# Patient Record
Sex: FEMALE | Race: WHITE | NOT HISPANIC OR LATINO | Employment: OTHER | URBAN - METROPOLITAN AREA
[De-identification: names, ages, dates, MRNs, and addresses within clinical notes are randomized per-mention and may not be internally consistent; named-entity substitution may affect disease eponyms.]

---

## 2017-02-02 ENCOUNTER — OFFICE VISIT (OUTPATIENT)
Dept: UROLOGY | Facility: CLINIC | Age: 82
End: 2017-02-02
Payer: MEDICARE

## 2017-02-02 VITALS
WEIGHT: 125 LBS | DIASTOLIC BLOOD PRESSURE: 68 MMHG | HEART RATE: 60 BPM | RESPIRATION RATE: 16 BRPM | SYSTOLIC BLOOD PRESSURE: 124 MMHG | BODY MASS INDEX: 23 KG/M2 | HEIGHT: 62 IN

## 2017-02-02 DIAGNOSIS — N30.10 INTERSTITIAL CYSTITIS: Primary | ICD-10-CM

## 2017-02-02 PROCEDURE — 1126F AMNT PAIN NOTED NONE PRSNT: CPT | Mod: S$GLB,,, | Performed by: UROLOGY

## 2017-02-02 PROCEDURE — 1157F ADVNC CARE PLAN IN RCRD: CPT | Mod: S$GLB,,, | Performed by: UROLOGY

## 2017-02-02 PROCEDURE — 1159F MED LIST DOCD IN RCRD: CPT | Mod: S$GLB,,, | Performed by: UROLOGY

## 2017-02-02 PROCEDURE — 81002 URINALYSIS NONAUTO W/O SCOPE: CPT | Mod: S$GLB,,, | Performed by: UROLOGY

## 2017-02-02 PROCEDURE — 99999 PR PBB SHADOW E&M-EST. PATIENT-LVL III: CPT | Mod: PBBFAC,,, | Performed by: UROLOGY

## 2017-02-02 PROCEDURE — 1160F RVW MEDS BY RX/DR IN RCRD: CPT | Mod: S$GLB,,, | Performed by: UROLOGY

## 2017-02-02 PROCEDURE — 99213 OFFICE O/P EST LOW 20 MIN: CPT | Mod: 25,S$GLB,, | Performed by: UROLOGY

## 2017-02-02 RX ORDER — SULFAMETHOXAZOLE AND TRIMETHOPRIM 400; 80 MG/1; MG/1
1 TABLET ORAL DAILY
Qty: 90 TABLET | Refills: 3 | Status: SHIPPED | OUTPATIENT
Start: 2017-02-02 | End: 2018-02-22

## 2017-02-02 NOTE — PROGRESS NOTES
"  Subjective:       Salome Moore is a 81 y.o. female who is an established pt who was self-referred for evaluation of IC.      She reports that she has had IC her "whole life." She takes Macrodantin daily for many, many years. She has been getting her care at [facility] for many years but has decided to switch to  doctors. Her previous urologist was Dr Cox.      She reports that she used to have urethral dilations for "infantile urethra." She has been on and off the abx prophylaxis. She has issues with getting the Macrodantin covered by her insurance. Her symptoms are characterized by burning and pain with urination. She continues to take hormones.    She was given Bactrim SS for prophylaxis at last visit (5/15). She was interested in urethral dilations though I would need to do cystoscopy prior to this as efficacy is questionable.     She has been doing great since last visit. No UTIs.       The following portions of the patient's history were reviewed and updated as appropriate: allergies, current medications, past family history, past medical history, past social history, past surgical history and problem list.    Review of Systems  Constitutional: no fever or chills  ENT: no nasal congestion or sore throat  Respiratory: no cough or shortness of breath  Cardiovascular: no chest pain or palpitations  Gastrointestinal: no nausea or vomiting, tolerating diet  Genitourinary: as per HPI  Hematologic/Lymphatic: no easy bruising or lymphadenopathy  Musculoskeletal: no arthralgias or myalgias  Skin: no rashes or lesions  Neurological: no seizures or tremors  Behavioral/Psych: no auditory or visual hallucinations       Objective:    Vitals:   Visit Vitals    Resp 16    Ht 5' 2" (1.575 m)    Wt 56.7 kg (125 lb)    BMI 22.86 kg/m2       Physical Exam   General: well developed, well nourished in no acute distress  Head: normocephalic, atraumatic  Neck: supple, trachea midline, no obvious enlargement of " thyroid  HEENT: EOMI, mucus membranes moist, sclera anicteric, no hearing impairment  Lungs: symmetric expansion, non-labored breathing  Cardiovascular: regular rate and rhythm, normal pulses  Abdomen: soft, non tender, non distended, no palpable masses, no hernias, no CVA tenderness  Musculoskeletal: no peripheral edema, normal ROM in bilateral upper and lower extremities  Lymphatics: no cervical or inguinal lymphadenopathy  Skin: no rashes or lesions  Neuro: alert and oriented x 3, no gross deficits  Psych: normal judgment and insight, normal mood/affect and non-anxious  Genitourinary:   patient declined exam    Lab Review   Urine analysis today in clinic shows 5-10 RBCs    No results found for: WBC, HGB, HCT, MCV, PLT  No results found for: CREATININE, BUN    Imaging  NA         Assessment/Plan:      1. Interstitial cystitis    - Bactrim SS for prophylaxis, she would like to continue this   - Doing great   - Discussed possibility of cystoscopy to evaluate urethra. She would like to hold on this.    - She is interested in urethral dilations. I prefer to hold on this as unsure its efficacy. Recommend cysto prior to this. Will hold on this for now.       Follow up in 6 months (prefers 6 mth f/u)

## 2017-02-02 NOTE — MR AVS SNAPSHOT
"    [facility] [place] [place] [facility]  [address]  [address]  [place] [address]  Phone: [phone]                  Salome Moore   2017 3:20 PM   Office Visit    Description:  Female : 1935   Provider:  Lilia Lepe MD   Department:  [facility] [place] [place] [facility]           Reason for Visit     Follow-up           Diagnoses this Visit        Comments    Interstitial cystitis    -  Primary            To Do List           Goals (5 Years of Data)     None      Follow-Up and Disposition     Return in about 6 months (around 2017) for Follow up on symptoms.       These Medications        Disp Refills Start End    sulfamethoxazole-trimethoprim 400-80mg (BACTRIM,SEPTRA) 400-80 mg per tablet 90 tablet 3 2017     Take 1 tablet by mouth once daily. - Oral    Pharmacy: [facility] [address] [place] [address] [address] [facility] Ph #: [phone]         [facility] On Call     [facility] On [facility] Nurse Care Line -  Assistance  Registered nurses in the [facility] On [facility] Center provide clinical advisement, health education, appointment booking, and other advisory services.  Call for this free service at [phone].             Medications                Verify that the below list of medications is an accurate representation of the medications you are currently taking.  If none reported, the list may be blank. If incorrect, please contact your healthcare provider. Carry this list with you in case of emergency.           Current Medications     amitriptyline (ELAVIL) 50 MG tablet     losartan (COZAAR) 100 MG tablet     sulfamethoxazole-trimethoprim 400-80mg (BACTRIM,SEPTRA) 400-80 mg per tablet Take 1 tablet by mouth once daily.    TAZORAC 0.1 % Gel gel     zolpidem (AMBIEN) 10 mg Tab            Clinical Reference Information           Your Vitals Were     BP Pulse Resp Height Weight BMI    124/68 60 16 5' 2" (1.575 m) 56.7 kg (125 lb) 22.86 kg/m2      Blood " Pressure          Most Recent Value    BP  124/68      Allergies as of 2/2/2017     Pcn [Penicillins]      Immunizations Administered on Date of Encounter - 2/2/2017     None      [facility] Sign-Up     Activating your [facility] account is as easy as 1-2-3!     1) Visit [email or web address], select Sign Up Now, enter this activation code and your date of birth, then select Next.  [email or web address]  Expires: 3/19/2017  3:37 PM      2) Create a username and password to use when you visit [facility] in the future and select a security question in case you lose your password and select Next.    3) Enter your e-mail address and click Sign Up!    Additional Information  If you have questions, please e-mail [email or web address] or call [phone] to talk to our [facility] staff. Remember, [facility] is NOT to be used for urgent needs. For medical emergencies, dial 911.         Language Assistance Services     ATTENTION: Language assistance services are available, free of charge. Please call [phone].      ATENCIÓN: Si habla español, tiene a manley disposición servicios gratuitos de asistencia lingüística. Llame al [phone].     CHÚ Ý: N?u b?n nói Ti?ng Vi?t, có các d?ch v? h? tr? ngôn ng? mi?n phí dành cho b?n. G?i s? [phone].         [facility] complies with applicable Federal civil rights laws and does not discriminate on the basis of race, color, national origin, age, disability, or sex.

## 2017-02-03 LAB
BILIRUB SERPL-MCNC: NORMAL MG/DL
BLOOD URINE, POC: NORMAL
COLOR, POC UA: NORMAL
GLUCOSE UR QL STRIP: NORMAL
KETONES UR QL STRIP: NORMAL
LEUKOCYTE ESTERASE URINE, POC: NORMAL
NITRITE, POC UA: NORMAL
PH, POC UA: 7
PROTEIN, POC: NORMAL
SPECIFIC GRAVITY, POC UA: 1020
UROBILINOGEN, POC UA: NORMAL

## 2017-02-27 ENCOUNTER — OFFICE VISIT (OUTPATIENT)
Dept: OBSTETRICS AND GYNECOLOGY | Facility: CLINIC | Age: 82
End: 2017-02-27
Payer: MEDICARE

## 2017-02-27 VITALS
WEIGHT: 121.25 LBS | HEIGHT: 62 IN | BODY MASS INDEX: 22.31 KG/M2 | SYSTOLIC BLOOD PRESSURE: 119 MMHG | DIASTOLIC BLOOD PRESSURE: 78 MMHG

## 2017-02-27 DIAGNOSIS — Z01.419 ENCOUNTER FOR ANNUAL ROUTINE GYNECOLOGICAL EXAMINATION: Primary | ICD-10-CM

## 2017-02-27 DIAGNOSIS — N95.0 PMB (POSTMENOPAUSAL BLEEDING): ICD-10-CM

## 2017-02-27 DIAGNOSIS — Z79.890 POSTMENOPAUSAL HRT (HORMONE REPLACEMENT THERAPY): ICD-10-CM

## 2017-02-27 PROCEDURE — 99999 PR PBB SHADOW E&M-EST. PATIENT-LVL III: CPT | Mod: PBBFAC,,, | Performed by: OBSTETRICS & GYNECOLOGY

## 2017-02-27 PROCEDURE — G0101 CA SCREEN;PELVIC/BREAST EXAM: HCPCS | Mod: S$GLB,,, | Performed by: OBSTETRICS & GYNECOLOGY

## 2017-02-27 RX ORDER — MEDROXYPROGESTERONE ACETATE 10 MG/1
10 TABLET ORAL DAILY
COMMUNITY
End: 2017-02-27 | Stop reason: SDUPTHER

## 2017-02-27 RX ORDER — MEDROXYPROGESTERONE ACETATE 10 MG/1
10 TABLET ORAL DAILY
Qty: 30 TABLET | Refills: 3 | Status: SHIPPED | OUTPATIENT
Start: 2017-02-27 | End: 2017-02-27 | Stop reason: SDUPTHER

## 2017-02-27 RX ORDER — ESTRADIOL 1 MG/1
1 TABLET ORAL DAILY
COMMUNITY
End: 2017-02-27 | Stop reason: SDUPTHER

## 2017-02-27 RX ORDER — ESTRADIOL 1 MG/1
1 TABLET ORAL DAILY
Qty: 90 TABLET | Refills: 3 | Status: SHIPPED | OUTPATIENT
Start: 2017-02-27 | End: 2017-03-03 | Stop reason: SDUPTHER

## 2017-02-27 NOTE — PROGRESS NOTES
Subjective:       Patient ID: Salome Moore is a 81 y.o. female.    Chief Complaint:  Annual Exam      History of Present Illness  HPI  Salome Moore is a 81 y.o. female here for annual exam.    She reports she is still having normal cycles which are 2-3 days. She has been on HRT for years.   Reports 3 days of spotting following the 10 days of progesterone break through bleeding.   denies vaginal itching or irritation.  denies vaginal discharge.  She is not currently sexually active.   History of abnormal pap: No  Last Pap: approximate date last year and was normal  Last MMG: pt refuses mammo-- had US last year at [facility]  Last Colonoscopy: pt declines.      She has been on HRT for >30 years. She has never had other medication and does not wish to switch therapy. She tried vaginal estrogen, but did not have relief of symptoms.     GYN & OB History  No LMP recorded.   Date of Last Pap: No result found    OB History   No data available       Review of Systems  Review of Systems   Constitutional: Negative for chills, fatigue and fever.   Respiratory: Negative for shortness of breath.    Cardiovascular: Negative for chest pain.   Gastrointestinal: Negative for abdominal pain, constipation, diarrhea, nausea and vomiting.   Genitourinary: Negative for dysuria, frequency, vaginal bleeding, vaginal discharge, vaginal pain, postcoital bleeding, postmenopausal bleeding and vaginal odor.   Breast: Negative for breast mass, breast pain, nipple discharge and skin changes          Objective:    Physical Exam:   Constitutional: She is oriented to person, place, and time. She appears well-developed and well-nourished. No distress.    HENT:   Head: Normocephalic and atraumatic.     Neck: Normal range of motion. No thyromegaly present.    Cardiovascular: Normal rate and normal heart sounds.  Exam reveals no gallop and no friction rub.    No murmur heard.   Pulmonary/Chest: Effort normal and breath sounds normal. No  respiratory distress. Right breast exhibits no inverted nipple, no mass, no nipple discharge, no skin change, no tenderness, presence, no bleeding and no swelling. Left breast exhibits no inverted nipple, no mass, no nipple discharge, no skin change, no tenderness, presence, no bleeding and no swelling. Breasts are symmetrical.        Abdominal: Soft. Normal appearance and bowel sounds are normal. She exhibits no distension. There is no hepatosplenomegaly. There is no tenderness. There is no rigidity, no rebound and no guarding.     Genitourinary: There is no rash, tenderness, lesion or injury on the right labia. There is no rash, tenderness, lesion or injury on the left labia. Uterus is not deviated, not enlarged, not fixed, not tender, not hosting fibroids and not experiencing uterine prolapse. Cervix is normal. No absent adnexa (present). Right adnexum displays no mass, no tenderness and no fullness. Left adnexum displays no mass, no tenderness and no fullness. No erythema, tenderness or bleeding in the vagina. No signs of injury around the vagina. No vaginal discharge found. Cervix exhibits no motion tenderness, no discharge and no friability.   Genitourinary Comments: Urethral meatus normal        Uterus Size: 8 cm      Lymphadenopathy:     She has no cervical adenopathy.     She has no axillary adenopathy.    Neurological: She is alert and oriented to person, place, and time.     Psychiatric: She has a normal mood and affect.          Assessment:        1. Encounter for annual routine gynecological examination    2. Postmenopausal HRT (hormone replacement therapy)    3. PMB (postmenopausal bleeding)              Plan:      1. Encounter for annual routine gynecological examination  - Pap not indicated. Stop screening at age 65 per guideline recommendations.   - MMG not indicated. Recommended to stop at age 75  - Colonoscopy patient declines.      2. Postmenopausal HRT  - Continue estrogen and progesterone as  directed.   -A full discussion of the benefit-risk ratio of hormonal replacement therapy was carried out. Improvement in vasomotor and other climacteric symptoms is discussed, including possible improvements in sleep and mood. Reduction of risk for osteoporosis was explained. We discussed the study data showing increased risk of thrombo-embolic events such as myocardial infarction, stroke and also possibly breast cancer with estrogen replacement, and how this might affect her. The range of side effects such as breast tenderness, weight gain and including possible increases in lifetime risk of breast cancer and possible thrombotic complications was discussed. We also discussed ACOG's recommendation to use hormone replacement therapy for the relief of hot flashes alone and to be on the lowest dose possible for the shortest amount of time.  Alternative such as herbal and soy-based products were reviewed. All of her questions about this therapy were answered.     3.  PMB (postmenopausal bleeding)  - very minimal-- wears pantyliner. She has been doing this every since she has been on the hormones.   - Declines US.        Return in about 1 year (around 2/27/2018) for Annual exam.

## 2017-02-27 NOTE — LETTER
March 1, 2017      Lilia Lepe MD  [address]  [address]  [facility] [address]           [facility]  [address]  [address]  [facility] [address]  Phone: [phone]          Patient: Salome Moore   MR Number: [id number]   YOB: 1935   Date of Visit: 2/27/2017       Dear Dr. Lilia Lepe:    Thank you for referring Salome Moore to me for evaluation. Attached you will find relevant portions of my assessment and plan of care.    If you have questions, please do not hesitate to call me. I look forward to following Salome Moore along with you.    Sincerely,    Selena Gagnon MD    Enclosure  CC:  No Recipients    If you would like to receive this communication electronically, please contact [email or web address] or [phone] to request more information on [employer] Link access.    For providers and/or their staff who would like to refer a patient to [facility], please contact us through our one-stop-shop provider referral line, [facility], at [phone].    If you feel you have received this communication in error or would no longer like to receive these types of communications, please e-mail [email or web address]

## 2017-02-27 NOTE — MR AVS SNAPSHOT
[facility]  [address]  [address]  [place] [address]  Phone: [phone]                  Salome KUMAR Teresa   2017 3:10 PM   Office Visit    Description:  Female : 1935   Provider:  Selena Gagnon MD   Department:  [facility]           Reason for Visit     Annual Exam                To Do List           Future Appointments        Provider Department Dept Phone    2017 3:10 PM Selena Gagnon MD [facility] - OB/ [phone]    8/3/2017 3:00 PM Lilia Lepe MD [facility] Urology [phone]      Goals (5 Years of Data)     None      [facility] On [facility]     [facility] On Call Nurse Care Line -  Assistance  Registered nurses in the [facility] On Call Center provide clinical advisement, health education, appointment booking, and other advisory services.  Call for this free service at [phone].             Medications                Verify that the below list of medications is an accurate representation of the medications you are currently taking.  If none reported, the list may be blank. If incorrect, please contact your healthcare provider. Carry this list with you in case of emergency.           Current Medications     amitriptyline (ELAVIL) 50 MG tablet     estradiol (ESTRACE) 1 MG tablet Take 1 mg by mouth once daily.    losartan (COZAAR) 100 MG tablet     medroxyPROGESTERone (PROVERA) 10 MG tablet Take 10 mg by mouth once daily.    sulfamethoxazole-trimethoprim 400-80mg (BACTRIM,SEPTRA) 400-80 mg per tablet Take 1 tablet by mouth once daily.    TAZORAC 0.1 % Gel gel     zolpidem (AMBIEN) 10 mg Tab            Clinical Reference Information           Your Vitals Were     BP                   119/78           Blood Pressure          Most Recent Value    BP  119/78      Allergies as of 2017     Pcn [Penicillins]      Immunizations Administered on Date of Encounter - 2017     None      [employer] Sign-Up     Activating your [employer] account is as  easy as 1-2-3!     1) Visit [email or web address], select Sign Up Now, enter this activation code and your date of birth, then select Next.  [email or web address]  Expires: 3/19/2017  3:37 PM      2) Create a username and password to use when you visit [facility] in the future and select a security question in case you lose your password and select Next.    3) Enter your e-mail address and click Sign Up!    Additional Information  If you have questions, please e-mail [email or web address] or call [phone] to talk to our [employer] staff. Remember, [facility] is NOT to be used for urgent needs. For medical emergencies, dial 911.         Language Assistance Services     ATTENTION: Language assistance services are available, free of charge. Please call [phone].      ATENCIÓN: Si habla margaret, tiene a manley disposición servicios gratuitos de asistencia lingüística. Llame al [phone].     CHÚ Ý: N?u b?n nói Ti?ng Vi?t, có các d?ch v? h? tr? ngôn ng? mi?n phí dành cho b?n. G?i s? [phone].         [facility] [place] complies with applicable Federal civil rights laws and does not discriminate on the basis of race, color, national origin, age, disability, or sex.

## 2017-03-01 RX ORDER — MEDROXYPROGESTERONE ACETATE 10 MG/1
TABLET ORAL
Qty: 90 TABLET | Refills: 3 | Status: SHIPPED | OUTPATIENT
Start: 2017-03-01 | End: 2017-03-03 | Stop reason: SDUPTHER

## 2017-03-03 ENCOUNTER — TELEPHONE (OUTPATIENT)
Dept: OBSTETRICS AND GYNECOLOGY | Facility: CLINIC | Age: 82
End: 2017-03-03

## 2017-03-03 DIAGNOSIS — Z79.890 POSTMENOPAUSAL HRT (HORMONE REPLACEMENT THERAPY): ICD-10-CM

## 2017-03-03 RX ORDER — ESTRADIOL 1 MG/1
1 TABLET ORAL DAILY
Qty: 90 TABLET | Refills: 3 | Status: SHIPPED | OUTPATIENT
Start: 2017-03-03 | End: 2018-02-21 | Stop reason: SDUPTHER

## 2017-03-03 RX ORDER — MEDROXYPROGESTERONE ACETATE 10 MG/1
TABLET ORAL
Qty: 90 TABLET | Refills: 3 | Status: SHIPPED | OUTPATIENT
Start: 2017-03-03 | End: 2018-02-21 | Stop reason: SDUPTHER

## 2017-03-03 NOTE — TELEPHONE ENCOUNTER
Patient would like Rx sent to [facility] on file, will pay for Rx out of pocket.

## 2017-03-03 NOTE — TELEPHONE ENCOUNTER
Spoke with pharmacy, issue resolved. VICTOR M perera sent, and HR Rx request faxed 1203pm 3/3/2017 to [phone]

## 2017-03-03 NOTE — TELEPHONE ENCOUNTER
----- Message from Mona Alonzo sent at 3/3/2017  9:49 AM CST -----  Contact: [facility] [employer]    Calling re prior auth form for Estradiol . Please send back by 1:00 in order to be processed .        [phone]          FAX # [phone]

## 2018-01-24 ENCOUNTER — TELEPHONE (OUTPATIENT)
Dept: UROLOGY | Facility: CLINIC | Age: 83
End: 2018-01-24

## 2018-01-24 NOTE — TELEPHONE ENCOUNTER
----- Message from Erin Espino sent at 1/24/2018  1:44 PM CST -----  Contact: self  Patient called to schedule annual with  Patient can only be seen on Tuesday and Wednesdays. Offered patient 3/20@10:40, declined stating that she need later and will contact PHN. Upset that she can't be accommodated. Please contact her at [phone].    Thanks!

## 2018-02-21 ENCOUNTER — OFFICE VISIT (OUTPATIENT)
Dept: OBSTETRICS AND GYNECOLOGY | Facility: CLINIC | Age: 83
End: 2018-02-21
Payer: MEDICARE

## 2018-02-21 VITALS
SYSTOLIC BLOOD PRESSURE: 121 MMHG | HEIGHT: 62 IN | WEIGHT: 121 LBS | DIASTOLIC BLOOD PRESSURE: 73 MMHG | BODY MASS INDEX: 22.26 KG/M2

## 2018-02-21 DIAGNOSIS — Z79.890 POSTMENOPAUSAL HRT (HORMONE REPLACEMENT THERAPY): ICD-10-CM

## 2018-02-21 DIAGNOSIS — Z01.419 ENCOUNTER FOR ANNUAL ROUTINE GYNECOLOGICAL EXAMINATION: Primary | ICD-10-CM

## 2018-02-21 PROCEDURE — G0101 CA SCREEN;PELVIC/BREAST EXAM: HCPCS | Mod: S$GLB,,, | Performed by: OBSTETRICS & GYNECOLOGY

## 2018-02-21 PROCEDURE — 99999 PR PBB SHADOW E&M-EST. PATIENT-LVL III: CPT | Mod: PBBFAC,,, | Performed by: OBSTETRICS & GYNECOLOGY

## 2018-02-21 RX ORDER — BROMPHENIRAMINE MALEATE, PSEUDOEPHEDRINE HYDROCHLORIDE, AND DEXTROMETHORPHAN HYDROBROMIDE 2; 30; 10 MG/5ML; MG/5ML; MG/5ML
SYRUP ORAL
Refills: 0 | COMMUNITY
Start: 2017-12-29 | End: 2018-02-21

## 2018-02-21 RX ORDER — ESTRADIOL 1 MG/1
1 TABLET ORAL DAILY
Qty: 90 TABLET | Refills: 3 | Status: SHIPPED | OUTPATIENT
Start: 2018-02-21 | End: 2018-11-02 | Stop reason: SDUPTHER

## 2018-02-21 RX ORDER — MEDROXYPROGESTERONE ACETATE 10 MG/1
TABLET ORAL
Qty: 90 TABLET | Refills: 3 | Status: SHIPPED | OUTPATIENT
Start: 2018-02-21 | End: 2018-09-27 | Stop reason: ALTCHOICE

## 2018-02-21 RX ORDER — SULFAMETHOXAZOLE AND TRIMETHOPRIM 800; 160 MG/1; MG/1
TABLET ORAL
Refills: 0 | COMMUNITY
Start: 2018-02-09 | End: 2018-02-21

## 2018-02-22 ENCOUNTER — OFFICE VISIT (OUTPATIENT)
Dept: UROLOGY | Facility: CLINIC | Age: 83
End: 2018-02-22
Payer: MEDICARE

## 2018-02-22 VITALS
SYSTOLIC BLOOD PRESSURE: 122 MMHG | WEIGHT: 124.31 LBS | BODY MASS INDEX: 22.88 KG/M2 | RESPIRATION RATE: 14 BRPM | DIASTOLIC BLOOD PRESSURE: 64 MMHG | HEART RATE: 60 BPM | HEIGHT: 62 IN

## 2018-02-22 DIAGNOSIS — N30.10 INTERSTITIAL CYSTITIS: Primary | ICD-10-CM

## 2018-02-22 PROCEDURE — 99213 OFFICE O/P EST LOW 20 MIN: CPT | Mod: S$GLB,,, | Performed by: UROLOGY

## 2018-02-22 PROCEDURE — 1159F MED LIST DOCD IN RCRD: CPT | Mod: S$GLB,,, | Performed by: UROLOGY

## 2018-02-22 PROCEDURE — 1126F AMNT PAIN NOTED NONE PRSNT: CPT | Mod: S$GLB,,, | Performed by: UROLOGY

## 2018-02-22 PROCEDURE — 99999 PR PBB SHADOW E&M-EST. PATIENT-LVL III: CPT | Mod: PBBFAC,,, | Performed by: UROLOGY

## 2018-02-22 PROCEDURE — 3008F BODY MASS INDEX DOCD: CPT | Mod: S$GLB,,, | Performed by: UROLOGY

## 2018-02-22 RX ORDER — SULFAMETHOXAZOLE AND TRIMETHOPRIM 400; 80 MG/1; MG/1
1 TABLET ORAL DAILY
Qty: 90 TABLET | Refills: 3 | Status: SHIPPED | OUTPATIENT
Start: 2018-02-22 | End: 2019-03-25 | Stop reason: SDUPTHER

## 2018-02-22 NOTE — PROGRESS NOTES
Subjective:       Patient ID: Salome Moore is a 82 y.o. female.    Chief Complaint:  Annual Exam      History of Present Illness  HPI  Salome Moore is a 82 y.o. female here for annual exam.    She reports she is still having normal cycles which are 2-3 days. She has been on HRT for years.   Reports 3 days of spotting following the 10 days of progesterone break through bleeding.   denies vaginal itching or irritation.  denies vaginal discharge.  She is not currently sexually active.   History of abnormal pap: No  Last Pap: approximate date 2016 and was normal  Last MMG: pt refuses mammo-- had US last year at [facility]  Last Colonoscopy: pt declines.      She has been on HRT for >30 years. She has never had other medication and does not wish to switch therapy. She tried vaginal estrogen, but did not have relief of symptoms.     GYN & OB History  No LMP recorded. Patient is postmenopausal.   Date of Last Pap: No result found    OB History   No data available       Review of Systems  Review of Systems   Constitutional: Negative for chills, fatigue and fever.   Respiratory: Negative for shortness of breath.    Cardiovascular: Negative for chest pain.   Gastrointestinal: Negative for abdominal pain, constipation, diarrhea, nausea and vomiting.   Genitourinary: Negative for dysuria, frequency, vaginal bleeding, vaginal discharge, vaginal pain, postcoital bleeding, postmenopausal bleeding and vaginal odor.   Breast: Negative for breast mass, breast pain, nipple discharge and skin changes          Objective:    Physical Exam:   Constitutional: She is oriented to person, place, and time. She appears well-developed and well-nourished. No distress.    HENT:   Head: Normocephalic and atraumatic.     Neck: Normal range of motion. No thyromegaly present.    Cardiovascular: Normal rate and normal heart sounds.  Exam reveals no gallop and no friction rub.    No murmur heard.   Pulmonary/Chest: Effort normal and breath  sounds normal. No respiratory distress. Right breast exhibits no inverted nipple, no mass, no nipple discharge, no skin change, no tenderness, presence, no bleeding and no swelling. Left breast exhibits no inverted nipple, no mass, no nipple discharge, no skin change, no tenderness, presence, no bleeding and no swelling. Breasts are symmetrical.   Implants solid.        Abdominal: Soft. Normal appearance and bowel sounds are normal. She exhibits no distension. There is no hepatosplenomegaly. There is no tenderness. There is no rigidity, no rebound and no guarding.     Genitourinary: Rectum normal. Rectal exam shows no external hemorrhoid, no fissure, no tenderness and anal tone normal. There is no rash, tenderness, lesion or injury on the right labia. There is no rash, tenderness, lesion or injury on the left labia. Uterus is not deviated, not enlarged, not fixed, not tender, not hosting fibroids and not experiencing uterine prolapse. Cervix is normal. No absent adnexa (present). Right adnexum displays no mass, no tenderness and no fullness. Left adnexum displays no mass, no tenderness and no fullness. No erythema, tenderness or bleeding in the vagina. No signs of injury around the vagina. No vaginal discharge found. Cervix exhibits no motion tenderness, no discharge and no friability.   Genitourinary Comments: Urethral meatus normal        Uterus Size: 8 cm      Lymphadenopathy:     She has no cervical adenopathy.     She has no axillary adenopathy.    Neurological: She is alert and oriented to person, place, and time.     Psychiatric: She has a normal mood and affect.          Assessment:        1. Encounter for annual routine gynecological examination    2. Postmenopausal HRT (hormone replacement therapy)              Plan:      1. Encounter for annual routine gynecological examination  - Pap not indicated. Stop screening at age 65 per guideline recommendations.   - MMG not indicated. Recommended to stop at age  75-- pt wishes to continue   - Colonoscopy patient declines.      2. Postmenopausal HRT  - Continue estrogen and progesterone as directed.   -A full discussion of the benefit-risk ratio of hormonal replacement therapy was carried out. Improvement in vasomotor and other climacteric symptoms is discussed, including possible improvements in sleep and mood. Reduction of risk for osteoporosis was explained. We discussed the study data showing increased risk of thrombo-embolic events such as myocardial infarction, stroke and also possibly breast cancer with estrogen replacement, and how this might affect her. The range of side effects such as breast tenderness, weight gain and including possible increases in lifetime risk of breast cancer and possible thrombotic complications was discussed. We also discussed ACOG's recommendation to use hormone replacement therapy for the relief of hot flashes alone and to be on the lowest dose possible for the shortest amount of time.  Alternative such as herbal and soy-based products were reviewed. All of her questions about this therapy were answered.          Follow-up in about 1 year (around 2/21/2019) for Annual exam.

## 2018-02-22 NOTE — PROGRESS NOTES
"  Subjective:       Salome Moore is a 82 y.o. female who is an established pt who was self-referred for evaluation of IC.      She reports that she has had IC her "whole life." She takes Macrodantin daily for many, many years. She has been getting her care at [facility] for many years but has decided to switch to  doctors. Her previous urologist was Dr Cox.      She reports that she used to have urethral dilations for "infantile urethra." She has been on and off the abx prophylaxis. She has issues with getting the Macrodantin covered by her insurance. Her symptoms are characterized by burning and pain with urination. She continues to take hormones.    She was given Bactrim SS for prophylaxis at previous visit (5/15). She was interested in urethral dilations though I would need to do cystoscopy prior to this as efficacy is questionable.     She has been doing great since last visit. No UTIs.       The following portions of the patient's history were reviewed and updated as appropriate: allergies, current medications, past family history, past medical history, past social history, past surgical history and problem list.    Review of Systems  Constitutional: no fever or chills  ENT: no nasal congestion or sore throat  Respiratory: no cough or shortness of breath  Cardiovascular: no chest pain or palpitations  Gastrointestinal: no nausea or vomiting, tolerating diet  Genitourinary: as per HPI  Hematologic/Lymphatic: no easy bruising or lymphadenopathy  Musculoskeletal: no arthralgias or myalgias  Skin: no rashes or lesions  Neurological: no seizures or tremors  Behavioral/Psych: no auditory or visual hallucinations       Objective:    Vitals:   /64   Pulse 60   Resp 14   Ht 5' 2" (1.575 m)   Wt 56.4 kg (124 lb 5.4 oz)   BMI 22.74 kg/m²     Physical Exam   General: well developed, well nourished in no acute distress  Head: normocephalic, atraumatic  Neck: supple, trachea midline, no obvious enlargement " of thyroid  HEENT: EOMI, mucus membranes moist, sclera anicteric, no hearing impairment  Lungs: symmetric expansion, non-labored breathing  Cardiovascular: regular rate and rhythm, normal pulses  Abdomen: soft, non tender, non distended, no palpable masses, no hernias, no CVA tenderness  Musculoskeletal: no peripheral edema, normal ROM in bilateral upper and lower extremities  Lymphatics: no cervical or inguinal lymphadenopathy  Skin: no rashes or lesions  Neuro: alert and oriented x 3, no gross deficits  Psych: normal judgment and insight, normal mood/affect and non-anxious  Genitourinary:   patient declined exam    Lab Review    Urine analysis today in clinic shows 5-10 RBCs    No results found for: WBC, HGB, HCT, MCV, PLT  No results found for: CREATININE, BUN    Imaging  NA       Assessment/Plan:      1. Interstitial cystitis    - Bactrim SS for prophylaxis, she would like to continue this   - Doing great   - Discussed possibility of cystoscopy to evaluate urethra. She would like to hold on this.    - She is interested in urethral dilations. I prefer to hold on this as unsure its efficacy. Recommend cysto prior to this. Will hold on this for now.       Follow up in 6 months (prefers 6 mth f/u)

## 2018-03-01 ENCOUNTER — TELEPHONE (OUTPATIENT)
Dept: OBSTETRICS AND GYNECOLOGY | Facility: CLINIC | Age: 83
End: 2018-03-01

## 2018-03-01 DIAGNOSIS — Z12.31 ENCOUNTER FOR SCREENING MAMMOGRAM FOR BREAST CANCER: Primary | ICD-10-CM

## 2018-03-01 NOTE — TELEPHONE ENCOUNTER
----- Message from Grace Penaloza sent at 3/1/2018 11:09 AM CST -----  Contact: self  Per patient is waiting on her order to be faxed to [facility]. Patient can be reached at [phone].      Thanks,    Patient is aware that her orders have been faxed to [facility]

## 2018-08-03 ENCOUNTER — OFFICE VISIT (OUTPATIENT)
Dept: FAMILY MEDICINE | Facility: CLINIC | Age: 83
End: 2018-08-03
Payer: MEDICARE

## 2018-08-03 VITALS
HEIGHT: 62 IN | HEART RATE: 95 BPM | WEIGHT: 123 LBS | BODY MASS INDEX: 22.63 KG/M2 | OXYGEN SATURATION: 95 % | RESPIRATION RATE: 16 BRPM | TEMPERATURE: 98 F | DIASTOLIC BLOOD PRESSURE: 66 MMHG | SYSTOLIC BLOOD PRESSURE: 112 MMHG

## 2018-08-03 DIAGNOSIS — Z79.899 POLYPHARMACY: ICD-10-CM

## 2018-08-03 DIAGNOSIS — I10 HYPERTENSION, WELL CONTROLLED: ICD-10-CM

## 2018-08-03 DIAGNOSIS — G47.9 SLEEP DISTURBANCE: Primary | ICD-10-CM

## 2018-08-03 DIAGNOSIS — Z78.0 POST-MENOPAUSAL: ICD-10-CM

## 2018-08-03 DIAGNOSIS — Z79.899 HIGH RISK MEDICATION USE: ICD-10-CM

## 2018-08-03 PROCEDURE — 99999 PR PBB SHADOW E&M-EST. PATIENT-LVL III: CPT | Mod: PBBFAC,,, | Performed by: FAMILY MEDICINE

## 2018-08-03 PROCEDURE — 99203 OFFICE O/P NEW LOW 30 MIN: CPT | Mod: S$GLB,,, | Performed by: FAMILY MEDICINE

## 2018-08-03 RX ORDER — CLARITHROMYCIN 500 MG/1
TABLET, FILM COATED ORAL
Refills: 0 | COMMUNITY
Start: 2018-05-24 | End: 2018-09-27 | Stop reason: ALTCHOICE

## 2018-08-03 NOTE — PROGRESS NOTES
"Subjective:       Patient ID: Salome Moore is a 82 y.o. female.    Chief Complaint: Establish Care    HPI    Establish Care    Pt states that she uses a "lot of herbs" for various elements and preventative measures.  She states that she takes 42 herbal supplements altogether in addition to her medications.  When asked about specific herbal supplements patient became rather indicating it and did not wish to disclose the specifics stating that I would not understand.      Chronic sleep disturbance-patient states that she has been on Ambien 10 mg for several years.  Patient states that she is aware that this medication is not recommended for patients over age 65.  She did state that her insurance refuses coverage of this but she does pay for it out of pocket.            Outpatient Prescriptions Marked as Taking for the 8/3/18 encounter (Office Visit) with Pa Hubbard MD   Medication Sig Dispense Refill    amitriptyline (ELAVIL) 50 MG tablet   5    estradiol (ESTRACE) 1 MG tablet Take 1 tablet (1 mg total) by mouth once daily. 90 tablet 3    losartan (COZAAR) 100 MG tablet   5    medroxyPROGESTERone (PROVERA) 10 MG tablet TAKE 1 TABLET BY MOUTH ONCE DAILY AS DIRECTED. ON DAYS 15- 24 OF EACH MONTH 90 tablet 3    sulfamethoxazole-trimethoprim 400-80mg (BACTRIM,SEPTRA) 400-80 mg per tablet Take 1 tablet by mouth once daily. 90 tablet 3    TAZORAC 0.1 % Gel gel   11    zolpidem (AMBIEN) 10 mg Tab   0       Past Medical History:   Diagnosis Date    Basal cell carcinoma     Cystitis     Hypertension        Family History   Problem Relation Age of Onset    Hypertension Father         reports that she has never smoked. She has never used smokeless tobacco.    Review of Systems   Constitutional: Negative for chills and fever.   HENT: Negative for congestion, ear pain, hearing loss, rhinorrhea and sore throat.    Eyes: Negative for pain and discharge.   Respiratory: Negative for cough and shortness of " breath.    Cardiovascular: Negative for chest pain and palpitations.   Gastrointestinal: Negative for diarrhea, nausea and vomiting.   Genitourinary: Negative for difficulty urinating, dysuria and frequency.   Allergic/Immunologic: Negative for environmental allergies and food allergies.   Neurological: Negative for seizures and weakness.   Psychiatric/Behavioral: Negative for dysphoric mood. The patient is not nervous/anxious.        Objective:     Vitals:    08/03/18 1359   BP: 112/66   Pulse: 95   Resp: 16   Temp: 98 °F (36.7 °C)        Physical Exam   Constitutional: She appears well-developed. No distress.   HENT:   Head: Normocephalic and atraumatic.   Eyes: Conjunctivae are normal. No scleral icterus.   Pulmonary/Chest: Effort normal.   Neurological: She is alert.   Skin: She is not diaphoretic.   Psychiatric: Her mood appears not anxious. Her affect is angry. Her affect is not blunt, not labile and not inappropriate. Her speech is not rapid and/or pressured, not delayed, not tangential and not slurred. She is not agitated, not aggressive, not hyperactive, not slowed, not withdrawn and not combative. Thought content is not paranoid and not delusional. She does not exhibit a depressed mood. She is communicative.   Vitals reviewed.      Assessment:       1. Sleep disturbance    2. Hypertension, well controlled    3. Post-menopausal    4. Polypharmacy    5. High risk medication use        Plan:       Salome was seen today for establish care.    Diagnoses and all orders for this visit:    This was a strange encounter.  Patient was argumentative with my nurse upon rooming when asked about her specific age and date of birth.  Shortly after our time together again in it was clear that she was not interested in my medical opinion about the herbal supplements that she was taking despite the fact that they could be dangerous.  Patient became agitated when I discussed my discomfort in prescribing Ambien 10 mg for her  based upon safety concerns raised by the CDC.  I spent 15-20 minutes trying to reconcile with the patient explaining that I only ones that elbow herbal supplements so that I could advise her potential interactions and to make sure that I did not provide any medications that could possibly interact.  Patient did not understand my approach.  Juan Antonio over time she became angry and she began to belittle modern medical knowledge and tehn my own personal medical knowledge.  At this point we reached an impasse as she did not seem receptive to continuing our discussion she was definitely lung not interested in hearing anything else from me.  I did explain to the patient that I was not trying to tell her what to do, but to offer my consultation and medical advice for her today.  I did offer that the patient could return to see me any time she wished she would like to continue our discussion or to address other issues.      Sleep disturbance  As above, I declined to renew ambien 10mg.     Hypertension, well controlled  Chronic - stable on losartan.     Post-menopausal  We did not get to broach the subject of why an 82yoF is on hormone replacement therapy which is not advisable.      Polypharmacy  She is taking too many supplements which may be affecting her mentation for mood.  Patient was not willing to be open and partner with me in her medical care.    High risk medication use  As above.           No Follow-up on file.      Pt verbalized understanding and agreed with our plan.

## 2018-09-27 ENCOUNTER — OFFICE VISIT (OUTPATIENT)
Dept: UROLOGY | Facility: CLINIC | Age: 83
End: 2018-09-27
Payer: MEDICARE

## 2018-09-27 VITALS
BODY MASS INDEX: 21.91 KG/M2 | HEIGHT: 62 IN | SYSTOLIC BLOOD PRESSURE: 110 MMHG | DIASTOLIC BLOOD PRESSURE: 60 MMHG | WEIGHT: 119.06 LBS

## 2018-09-27 DIAGNOSIS — N30.10 INTERSTITIAL CYSTITIS: Primary | ICD-10-CM

## 2018-09-27 PROCEDURE — 99213 OFFICE O/P EST LOW 20 MIN: CPT | Mod: PBBFAC | Performed by: UROLOGY

## 2018-09-27 PROCEDURE — 99999 PR PBB SHADOW E&M-EST. PATIENT-LVL III: CPT | Mod: PBBFAC,,, | Performed by: UROLOGY

## 2018-09-27 PROCEDURE — 99213 OFFICE O/P EST LOW 20 MIN: CPT | Mod: S$PBB,,, | Performed by: UROLOGY

## 2018-09-27 PROCEDURE — 81002 URINALYSIS NONAUTO W/O SCOPE: CPT | Mod: PBBFAC | Performed by: UROLOGY

## 2018-09-27 PROCEDURE — 1101F PT FALLS ASSESS-DOCD LE1/YR: CPT | Mod: CPTII,,, | Performed by: UROLOGY

## 2018-09-27 NOTE — PROGRESS NOTES
"  Subjective:       Salome Moore is a 82 y.o. female who is an established pt who was self-referred for evaluation of IC.      She reports that she has had IC her "whole life." She takes Macrodantin daily for many, many years. She has been getting her care at [facility] for many years but has decided to switch to  doctors. Her previous urologist was Dr Cox.      She reports that she used to have urethral dilations for "infantile urethra." She has been on and off the abx prophylaxis. She has issues with getting the Macrodantin covered by her insurance. Her symptoms are characterized by burning and pain with urination. She continues to take hormones.    She was given Bactrim SS for prophylaxis at previous visit (5/15). She was interested in urethral dilations though I would need to do cystoscopy prior to this as efficacy is questionable.     She has been doing great since last visit. No UTIs.       The following portions of the patient's history were reviewed and updated as appropriate: allergies, current medications, past family history, past medical history, past social history, past surgical history and problem list.    Review of Systems  Constitutional: no fever or chills  ENT: no nasal congestion or sore throat  Respiratory: no cough or shortness of breath  Cardiovascular: no chest pain or palpitations  Gastrointestinal: no nausea or vomiting, tolerating diet  Genitourinary: as per HPI  Hematologic/Lymphatic: no easy bruising or lymphadenopathy  Musculoskeletal: no arthralgias or myalgias  Skin: no rashes or lesions  Neurological: no seizures or tremors  Behavioral/Psych: no auditory or visual hallucinations       Objective:    Vitals:   Ht 5' 2" (1.575 m)   Wt 54 kg (119 lb 0.8 oz)   BMI 21.77 kg/m²     Physical Exam   General: well developed, well nourished in no acute distress  Head: normocephalic, atraumatic  Neck: supple, trachea midline, no obvious enlargement of thyroid  HEENT: EOMI, mucus " membranes moist, sclera anicteric, no hearing impairment  Lungs: symmetric expansion, non-labored breathing  Cardiovascular: regular rate and rhythm, normal pulses  Abdomen: soft, non tender, non distended, no palpable masses, no hernias, no CVA tenderness  Musculoskeletal: no peripheral edema, normal ROM in bilateral upper and lower extremities  Lymphatics: no cervical or inguinal lymphadenopathy  Skin: no rashes or lesions  Neuro: alert and oriented x 3, no gross deficits  Psych: normal judgment and insight, normal mood/affect and non-anxious  Genitourinary:   patient declined exam    Lab Review    Urine analysis today in clinic shows - negative    No results found for: WBC, HGB, HCT, MCV, PLT  No results found for: CREATININE, BUN    Imaging  NA       Assessment/Plan:      1. Interstitial cystitis    - Bactrim SS for prophylaxis, she would like to continue this   - Doing great   - Discussed possibility of cystoscopy to evaluate urethra. She would like to hold on this.    - She is interested in urethral dilations. I prefer to hold on this as unsure its efficacy. Recommend cysto prior to this. Will hold on this for now.        Follow up in 6 months (prefers 6 mth f/u)

## 2018-09-28 LAB
BILIRUB SERPL-MCNC: NEGATIVE MG/DL
BLOOD URINE, POC: NEGATIVE
COLOR, POC UA: YELLOW
GLUCOSE UR QL STRIP: NEGATIVE
KETONES UR QL STRIP: NEGATIVE
LEUKOCYTE ESTERASE URINE, POC: NEGATIVE
NITRITE, POC UA: NEGATIVE
PH, POC UA: 7
PROTEIN, POC: NEGATIVE
SPECIFIC GRAVITY, POC UA: 1000
UROBILINOGEN, POC UA: NEGATIVE

## 2018-11-02 ENCOUNTER — OFFICE VISIT (OUTPATIENT)
Dept: OBSTETRICS AND GYNECOLOGY | Facility: CLINIC | Age: 83
End: 2018-11-02
Payer: MEDICARE

## 2018-11-02 VITALS — WEIGHT: 122.13 LBS | BODY MASS INDEX: 22.48 KG/M2 | HEIGHT: 62 IN

## 2018-11-02 DIAGNOSIS — Z78.0 ASYMPTOMATIC MENOPAUSAL STATE: ICD-10-CM

## 2018-11-02 DIAGNOSIS — Z12.39 SCREENING BREAST EXAMINATION: ICD-10-CM

## 2018-11-02 DIAGNOSIS — Z79.890 POSTMENOPAUSAL HRT (HORMONE REPLACEMENT THERAPY): ICD-10-CM

## 2018-11-02 DIAGNOSIS — Z01.419 WELL WOMAN EXAM WITH ROUTINE GYNECOLOGICAL EXAM: Primary | ICD-10-CM

## 2018-11-02 PROCEDURE — 99999 PR PBB SHADOW E&M-EST. PATIENT-LVL III: CPT | Mod: PBBFAC,,, | Performed by: OBSTETRICS & GYNECOLOGY

## 2018-11-02 PROCEDURE — G0101 CA SCREEN;PELVIC/BREAST EXAM: HCPCS | Mod: S$GLB,,, | Performed by: OBSTETRICS & GYNECOLOGY

## 2018-11-02 RX ORDER — ESTRADIOL 1 MG/1
1 TABLET ORAL DAILY
Qty: 90 TABLET | Refills: 3 | Status: SHIPPED | OUTPATIENT
Start: 2018-11-02 | End: 2019-10-25

## 2018-11-02 NOTE — PROGRESS NOTES
History & Physical  Gynecology      SUBJECTIVE:     Chief Complaint: Well Woman       History of Present Illness:  Annual Exam-Postmenopausal  Ms. Moore is a 83 y/o female who presents for annual exam. The patient has no complaints today. The patient is not currently sexually active. GYN screening history: last pap: was normal and last mammogram: was normal. The patient is taking hormone replacement therapy. Patient denies post-menopausal vaginal bleeding. The patient wears seatbelts: yes. The patient participates in regular exercise: yes. Has the patient ever been transfused or tattooed?: no. The patient reports that there is not domestic violence in her life.      Review of patient's allergies indicates:   Allergen Reactions    Pcn [penicillins] Other (See Comments)     Patient can't remember the reaction. Reaction occurred over 60 years       Past Medical History:   Diagnosis Date    Basal cell carcinoma     Cystitis     Hypertension      Past Surgical History:   Procedure Laterality Date    BREAST SURGERY      augmentation    VAGINAL DELIVERY       OB History      Para Term  AB Living    5         3    SAB TAB Ectopic Multiple Live Births                     Family History   Problem Relation Age of Onset    Hypertension Father      Social History     Tobacco Use    Smoking status: Never Smoker    Smokeless tobacco: Never Used   Substance Use Topics    Alcohol use: No     Frequency: Never    Drug use: No       Current Outpatient Medications   Medication Sig    estradiol (ESTRACE) 1 MG tablet Take 1 tablet (1 mg total) by mouth once daily.    losartan (COZAAR) 100 MG tablet     TAZORAC 0.1 % Gel gel     zolpidem (AMBIEN) 10 mg Tab     amitriptyline (ELAVIL) 50 MG tablet     sulfamethoxazole-trimethoprim 400-80mg (BACTRIM,SEPTRA) 400-80 mg per tablet Take 1 tablet by mouth once daily.     No current facility-administered medications for this visit.      Review of Systems:  Review of  Systems   Constitutional: Negative for chills and fever.   Respiratory: Negative for cough.    Cardiovascular: Negative for chest pain and palpitations.   Gastrointestinal: Negative for abdominal pain, nausea and vomiting.   Endocrine: Negative for diabetes and hypothyroidism.   Genitourinary: Negative for dysuria, frequency, hematuria, pelvic pain, vaginal bleeding, vaginal discharge and vaginal pain.   Neurological: Negative for headaches.   Psychiatric/Behavioral: Negative for depression.   Breast: Negative for breast mass and breast pain       OBJECTIVE:     Physical Exam:  Physical Exam   Constitutional: She is oriented to person, place, and time. She appears well-developed and well-nourished.   Cardiovascular: Normal rate and normal heart sounds.   Pulmonary/Chest: Effort normal. No respiratory distress. Breasts are symmetrical. There is no breast swelling.   Breast implanted palpable bilateral but hardened in the left breast   Abdominal: Soft. She exhibits no distension. There is no tenderness.   Genitourinary: Rectum normal, vagina normal and uterus normal. No breast tenderness, discharge or bleeding. Pelvic exam was performed with patient supine. Cervix exhibits no motion tenderness. Right adnexum displays no fullness. Left adnexum displays no fullness. No bleeding in the vagina. No vaginal discharge found.   Neurological: She is oriented to person, place, and time.   Skin: Skin is warm and dry.   Psychiatric: She has a normal mood and affect.   Vitals reviewed.    ASSESSMENT:       ICD-10-CM ICD-9-CM    1. Well woman exam with routine gynecological exam Z01.419 V72.31 CANCELED: DXA Bone Density Spine And Hip   2. Postmenopausal HRT (hormone replacement therapy) Z79.890 V07.4 estradiol (ESTRACE) 1 MG tablet   3. Screening breast examination Z12.31 V76.10 US Breast Bilateral Complete   4. Asymptomatic menopausal state  Z78.0 V49.81 CANCELED: DXA Bone Density Spine And Hip          Plan:      Salome was  seen today for well woman.    Diagnoses and all orders for this visit:    Well woman exam with routine gynecological exam  -     Cancel: DXA Bone Density Spine And Hip; Future  - Patient declines mammogram as it is too painful with implants. Prefers ultrasound  - Declined DXA scan   - Pap not indicated    Postmenopausal HRT (hormone replacement therapy)  -     estradiol (ESTRACE) 1 MG tablet; Take 1 tablet (1 mg total) by mouth once daily.    Screening breast examination  -     US Breast Bilateral Complete; Future        Orders Placed This Encounter   Procedures    US Breast Bilateral Complete       Follow-up in about 1 year (around 11/2/2019) for well woman.    Counseling time: 15 minutes    Emelia Galan

## 2018-11-02 NOTE — PATIENT INSTRUCTIONS
Prevention Guidelines, Women Ages 65 and Older  Screening tests and vaccines are an important part of managing your health. Health counseling is essential, too. Below are guidelines for these, for women ages 65 and older. Talk with your healthcare provider to make sure youre up to date on what you need.  Screening Who needs it How often   Type 2 diabetes or prediabetes All adults beginning at age 45 and adults without symptoms at any age who are overweight or obese and have 1 or more additional risk factors for diabetes At least every 3 years   Alcohol misuse All women in this age group At routine exams   Blood pressure All women in this age group Every 2 years if your blood pressure is less than 120/80 mm Hg; yearly if your systolic blood pressure is 120 to 139 mm Hg, or your diastolic blood pressure reading is 80 to 89 mm Hg   Breast cancer All women in this age group Yearly mammogram and clinical breast exam1   Cervical cancer Only women who had abnormal screening results before age 65 Talk with your healthcare provider   Chlamydia Women at increased risk for infection At routine exams   Colorectal cancer All women in this age group1 Flexible sigmoidoscopy every 5 years, or colonoscopy every 10 years, or double-contrast barium enema every 5 years; yearly fecal occult blood test or fecal immunochemical test; or a stool DNA test as often as your healthcare provider advises; talk with your healthcare provider about which tests are best for you   Depression All women in this age group At routine exams   Gonorrhea Sexually active women at increased risk for infection At routine exams   Hepatitis C Anyone at increased risk; 1 time for those born between 1945 and 1965 At routine exams   High cholesterol or triglycerides All women in this age group who are at risk for coronary artery disease At least every 5 years   HIV Women at increased risk for infection - talk with your healthcare provider At routine exams   Lung  cancer Adults age 55 to 80 who have smoked Yearly screening in smokers with 30 pack-year history of smoking or who quit within 15 years   Obesity All women in this age group At routine exams   Osteoporosis All women in this age group Bone density test at age 65, then follow-up as advised by your healthcare provider   Syphilis Women at increased risk for infection - talk with your healthcare provider At routine exams   Thyroid-Stimulating Hormone (TSH) All women in this age group Every 5 years   Tuberculosis Women at increased risk for infection - talk with your healthcare provider Ask your healthcare provider   Vision All women in this age group Every 1 to 2 years; if you have a chronic health condition, ask your healthcare provider if you need exams more often   Vaccine Who needs it How often   Chickenpox (varicella) All women in this age group who have no record of this infection or vaccine 2 doses; second dose should be given at least 4 weeks after the first dose   Hepatitis A Women at increased risk for infection - talk with your healthcare provider 2 doses given 6 months apart   Hepatitis B Women at increased risk for infection - talk with your healthcare provider 3 doses over 6 months; second dose should be given 1 month after the first dose; the third dose should be given at least 2 months after the second dose and at least 4 months after the first dose   Haemophilus influenza Type B (HIB) Women at increased risk for infection - talk with your healthcare provider 1 to 3 doses   Influenza (flu) All women in this age group Once a year   Pneumococcal conjugate vaccine (PCV13) and pneumococcal polysaccharide vaccine (PPSV23) All women in this age group 1 dose of each vaccine   Tetanus/diphtheria/pertussis (Td/Tdap) booster All women in this age group Td every 10 years, or a one-time dose of Tdap instead of a Td booster after age 18, then Td every 10 years   Zoster All women in this age group 1 dose   Counseling  Who needs it How often   Diet and exercise Women who are overweight or obese When diagnosed, and then at routine exams   Fall prevention (exercise and vitamin D supplements) All women in this age group At routine exams   Sexually transmitted infection prevention Women at increased risk for infection - talk with your healthcare provider At routine exams   Use of daily aspirin Women ages 55 and up in this age group who are at risk for cardiovascular health problems such as stroke When your risk is known   Use of tobacco and the health effects it can cause All women in this age group Every exam   1[employer]  Date Last Reviewed: 8/9/2015  © 6623-7814 [employer]. [address] [place] [address], [place] [address]. All rights reserved. This information is not intended as a substitute for professional medical care. Always follow your healthcare professional's instructions.

## 2018-12-07 ENCOUNTER — HOSPITAL ENCOUNTER (OUTPATIENT)
Dept: RADIOLOGY | Facility: HOSPITAL | Age: 83
Discharge: HOME | End: 2018-12-07
Attending: OBSTETRICS & GYNECOLOGY
Payer: MEDICARE

## 2018-12-07 DIAGNOSIS — Z12.39 SCREENING BREAST EXAMINATION: ICD-10-CM

## 2019-01-24 ENCOUNTER — INITIAL CONSULT (OUTPATIENT)
Dept: OPHTHALMOLOGY | Facility: CLINIC | Age: 84
End: 2019-01-24
Payer: MEDICARE

## 2019-01-24 DIAGNOSIS — H40.013 OPEN ANGLE WITH BORDERLINE FINDINGS AND LOW GLAUCOMA RISK IN BOTH EYES: ICD-10-CM

## 2019-01-24 DIAGNOSIS — Z96.1 PSEUDOPHAKIA: ICD-10-CM

## 2019-01-24 DIAGNOSIS — H40.003 GLAUCOMA SUSPECT OF BOTH EYES: Primary | ICD-10-CM

## 2019-01-24 DIAGNOSIS — H52.7 REFRACTIVE ERROR: ICD-10-CM

## 2019-01-24 DIAGNOSIS — I10 ESSENTIAL HYPERTENSION: ICD-10-CM

## 2019-01-24 PROCEDURE — 76514 ECHO EXAM OF EYE THICKNESS: CPT | Mod: S$GLB,,, | Performed by: OPHTHALMOLOGY

## 2019-01-24 PROCEDURE — 99999 PR PBB SHADOW E&M-EST. PATIENT-LVL II: CPT | Mod: PBBFAC,,, | Performed by: OPHTHALMOLOGY

## 2019-01-24 PROCEDURE — 92004 PR EYE EXAM, NEW PATIENT,COMPREHESV: ICD-10-PCS | Mod: S$GLB,,, | Performed by: OPHTHALMOLOGY

## 2019-01-24 PROCEDURE — 76514 PR  US, EYE, FOR CORNEAL THICKNESS: ICD-10-PCS | Mod: S$GLB,,, | Performed by: OPHTHALMOLOGY

## 2019-01-24 PROCEDURE — 92004 COMPRE OPH EXAM NEW PT 1/>: CPT | Mod: S$GLB,,, | Performed by: OPHTHALMOLOGY

## 2019-01-24 PROCEDURE — 99999 PR PBB SHADOW E&M-EST. PATIENT-LVL II: ICD-10-PCS | Mod: PBBFAC,,, | Performed by: OPHTHALMOLOGY

## 2019-01-24 NOTE — PROGRESS NOTES
Subjective:       Patient ID: Salome Moore is a 83 y.o. female.    Chief Complaint: Glaucoma (Glaucoma suspect OS )    HPI     Glaucoma      Additional comments: Glaucoma suspect OS               Comments     83 y.o. Female is here for Glaucoma suspect OS. S/P PCIOL OU.Last Eye   Exam was a few months ago. Denies eye pain. H/o f/f. Flashes had become   more frequent. Eyes constantly itch. No burning or tearing. No noticeable   VA changes since last visit. Do have trouble with glare.     Eye Meds: Systane prn OU           Last edited by IBETH Rice on 1/24/2019  2:07 PM. (History)             Assessment:       1. Glaucoma suspect of both eyes    2. Open angle with borderline findings and low glaucoma risk in both eyes    3. Essential hypertension    4. Refractive error    5. Pseudophakia        Plan:       Glaucoma suspect OU-Pt with nl IOP's OU & suspicious ON's OU. CCT's:  (0),  (-1). Pt does not know if any family members have glaucoma.  HTN-No retinopathy OU.  RE-Pt does not want MRx.        RTC 4-6 wks for IOP's, HVF's & OCT's.  Control HTN.

## 2019-03-25 ENCOUNTER — OFFICE VISIT (OUTPATIENT)
Dept: UROLOGY | Facility: CLINIC | Age: 84
End: 2019-03-25
Payer: MEDICARE

## 2019-03-25 VITALS — HEIGHT: 64 IN | RESPIRATION RATE: 16 BRPM | WEIGHT: 127 LBS | BODY MASS INDEX: 21.68 KG/M2

## 2019-03-25 DIAGNOSIS — N30.10 INTERSTITIAL CYSTITIS: Primary | ICD-10-CM

## 2019-03-25 PROCEDURE — 1101F PR PT FALLS ASSESS DOC 0-1 FALLS W/OUT INJ PAST YR: ICD-10-PCS | Mod: CPTII,S$GLB,, | Performed by: UROLOGY

## 2019-03-25 PROCEDURE — 99213 OFFICE O/P EST LOW 20 MIN: CPT | Mod: S$GLB,,, | Performed by: UROLOGY

## 2019-03-25 PROCEDURE — 99213 PR OFFICE/OUTPT VISIT, EST, LEVL III, 20-29 MIN: ICD-10-PCS | Mod: S$GLB,,, | Performed by: UROLOGY

## 2019-03-25 PROCEDURE — 1101F PT FALLS ASSESS-DOCD LE1/YR: CPT | Mod: CPTII,S$GLB,, | Performed by: UROLOGY

## 2019-03-25 PROCEDURE — 99999 PR PBB SHADOW E&M-EST. PATIENT-LVL III: CPT | Mod: PBBFAC,,, | Performed by: UROLOGY

## 2019-03-25 PROCEDURE — 99999 PR PBB SHADOW E&M-EST. PATIENT-LVL III: ICD-10-PCS | Mod: PBBFAC,,, | Performed by: UROLOGY

## 2019-03-25 RX ORDER — SULFAMETHOXAZOLE AND TRIMETHOPRIM 400; 80 MG/1; MG/1
1 TABLET ORAL DAILY
Qty: 90 TABLET | Refills: 3 | Status: SHIPPED | OUTPATIENT
Start: 2019-03-25

## 2019-03-25 NOTE — PROGRESS NOTES
"  Subjective:       Salome Moore is a 83 y.o. female who is an established pt who was self-referred for evaluation of IC.      She reports that she has had IC her "whole life." She takes Macrodantin daily for many, many years. She has been getting her care at [facility] for many years but has decided to switch to  doctors. Her previous urologist was Dr Cox.      She reports that she used to have urethral dilations for "infantile urethra." She has been on and off the abx prophylaxis. She has issues with getting the Macrodantin covered by her insurance. Her symptoms are characterized by burning and pain with urination. She continues to take hormones.    She was given Bactrim SS for prophylaxis at previous visit (5/15). She was interested in urethral dilations though I would need to do cystoscopy prior to this as efficacy is questionable.     She has been doing great since last visit. No UTIs.       The following portions of the patient's history were reviewed and updated as appropriate: allergies, current medications, past family history, past medical history, past social history, past surgical history and problem list.    Review of Systems  Constitutional: no fever or chills  ENT: no nasal congestion or sore throat  Respiratory: no cough or shortness of breath  Cardiovascular: no chest pain or palpitations  Gastrointestinal: no nausea or vomiting, tolerating diet  Genitourinary: as per HPI  Hematologic/Lymphatic: no easy bruising or lymphadenopathy  Musculoskeletal: no arthralgias or myalgias  Skin: no rashes or lesions  Neurological: no seizures or tremors  Behavioral/Psych: no auditory or visual hallucinations       Objective:    Vitals:   Resp 16   Ht 5' 4" (1.626 m)   Wt 57.6 kg (127 lb)   BMI 21.80 kg/m²     Physical Exam   General: well developed, well nourished in no acute distress  Head: normocephalic, atraumatic  Neck: supple, trachea midline, no obvious enlargement of thyroid  HEENT: EOMI, mucus " membranes moist, sclera anicteric, no hearing impairment  Lungs: symmetric expansion, non-labored breathing  Cardiovascular: regular rate and rhythm, normal pulses  Abdomen: soft, non tender, non distended, no palpable masses, no hernias, no CVA tenderness  Musculoskeletal: no peripheral edema, normal ROM in bilateral upper and lower extremities  Lymphatics: no cervical or inguinal lymphadenopathy  Skin: no rashes or lesions  Neuro: alert and oriented x 3, no gross deficits  Psych: normal judgment and insight, normal mood/affect and non-anxious  Genitourinary:   patient declined exam    Lab Review    Urine analysis today in clinic shows - 5-10 RBCs    No results found for: WBC, HGB, HCT, MCV, PLT  No results found for: CREATININE, BUN     Imaging  NA       Assessment/Plan:      1. Interstitial cystitis    - Bactrim SS for prophylaxis, she would like to continue this   - Doing great   - Discussed possibility of cystoscopy to evaluate urethra. She would like to hold on this.    - She is interested in urethral dilations. I prefer to hold on this as unsure its efficacy. Recommend cysto prior to this. Will hold on this for now.        Follow up in 6 months (prefers 6 mth f/u)

## 2019-04-26 ENCOUNTER — CLINICAL SUPPORT (OUTPATIENT)
Dept: OPHTHALMOLOGY | Facility: CLINIC | Age: 84
End: 2019-04-26
Payer: MEDICARE

## 2019-04-26 ENCOUNTER — OFFICE VISIT (OUTPATIENT)
Dept: OPHTHALMOLOGY | Facility: CLINIC | Age: 84
End: 2019-04-26
Payer: MEDICARE

## 2019-04-26 DIAGNOSIS — H40.1131 PRIMARY OPEN ANGLE GLAUCOMA (POAG) OF BOTH EYES, MILD STAGE: Primary | ICD-10-CM

## 2019-04-26 DIAGNOSIS — Z96.1 PSEUDOPHAKIA: ICD-10-CM

## 2019-04-26 DIAGNOSIS — H40.013 OPEN ANGLE WITH BORDERLINE FINDINGS AND LOW GLAUCOMA RISK IN BOTH EYES: ICD-10-CM

## 2019-04-26 PROCEDURE — 99999 PR PBB SHADOW E&M-EST. PATIENT-LVL II: ICD-10-PCS | Mod: PBBFAC,,, | Performed by: OPHTHALMOLOGY

## 2019-04-26 PROCEDURE — 92012 INTRM OPH EXAM EST PATIENT: CPT | Mod: S$GLB,,, | Performed by: OPHTHALMOLOGY

## 2019-04-26 PROCEDURE — 99999 PR PBB SHADOW E&M-EST. PATIENT-LVL II: CPT | Mod: PBBFAC,,, | Performed by: OPHTHALMOLOGY

## 2019-04-26 PROCEDURE — 92012 PR EYE EXAM, EST PATIENT,INTERMED: ICD-10-PCS | Mod: S$GLB,,, | Performed by: OPHTHALMOLOGY

## 2019-04-26 PROCEDURE — 92133 POSTERIOR SEGMENT OCT OPTIC NERVE(OCULAR COHERENCE TOMOGRAPHY) - OU - BOTH EYES: ICD-10-PCS | Mod: S$GLB,,, | Performed by: OPHTHALMOLOGY

## 2019-04-26 PROCEDURE — 92083 EXTENDED VISUAL FIELD XM: CPT | Mod: S$GLB,,, | Performed by: OPHTHALMOLOGY

## 2019-04-26 PROCEDURE — 92083 HUMPHREY VISUAL FIELD - OU - BOTH EYES: ICD-10-PCS | Mod: S$GLB,,, | Performed by: OPHTHALMOLOGY

## 2019-04-26 PROCEDURE — 92133 CPTRZD OPH DX IMG PST SGM ON: CPT | Mod: S$GLB,,, | Performed by: OPHTHALMOLOGY

## 2019-04-26 RX ORDER — LATANOPROST 50 UG/ML
1 SOLUTION/ DROPS OPHTHALMIC NIGHTLY
Qty: 5 ML | Refills: 6 | Status: SHIPPED | OUTPATIENT
Start: 2019-04-26 | End: 2020-02-05 | Stop reason: CLARIF

## 2019-04-27 NOTE — PROGRESS NOTES
Subjective:       Patient ID: Salome Moore is a 83 y.o. female.    Chief Complaint: Glaucoma (4 weeks for IOP's, HVF's & OCT's. )    HPI     Glaucoma      Additional comments: 4 weeks for IOP's, HVF's & OCT's.               Comments     83 y.o. Female is here for 4 weeks for IOP's, HVF's & OCT's. Periodically   experience moderate eye pain left eye. Eyes constantly itch. No burning or   tearing. No noticeable VA changes since last visit.     Eye Meds: AT's prn OU           Last edited by IBETH Rice on 4/26/2019  2:03 PM. (History)             Assessment:       1. Primary open angle glaucoma (POAG) of both eyes, mild stage    2. Pseudophakia        Plan:       POAG-IOP's are stable. HVF's are WNL's OD & with superior arcuate scotoma OS. OCT's are borderline OD & abnl OS with inferior NFL thinning.        Start Xalatan OS.  RTC 4 wks for IOP's.

## 2019-09-26 ENCOUNTER — OFFICE VISIT (OUTPATIENT)
Dept: UROLOGY | Facility: CLINIC | Age: 84
End: 2019-09-26
Payer: MEDICARE

## 2019-09-26 VITALS
WEIGHT: 121 LBS | HEIGHT: 64 IN | SYSTOLIC BLOOD PRESSURE: 118 MMHG | DIASTOLIC BLOOD PRESSURE: 83 MMHG | BODY MASS INDEX: 20.66 KG/M2

## 2019-09-26 DIAGNOSIS — N30.10 INTERSTITIAL CYSTITIS: Primary | ICD-10-CM

## 2019-09-26 PROCEDURE — 3079F PR MOST RECENT DIASTOLIC BLOOD PRESSURE 80-89 MM HG: ICD-10-PCS | Mod: CPTII,S$GLB,, | Performed by: UROLOGY

## 2019-09-26 PROCEDURE — 3074F PR MOST RECENT SYSTOLIC BLOOD PRESSURE < 130 MM HG: ICD-10-PCS | Mod: CPTII,S$GLB,, | Performed by: UROLOGY

## 2019-09-26 PROCEDURE — 1101F PR PT FALLS ASSESS DOC 0-1 FALLS W/OUT INJ PAST YR: ICD-10-PCS | Mod: CPTII,S$GLB,, | Performed by: UROLOGY

## 2019-09-26 PROCEDURE — 99213 OFFICE O/P EST LOW 20 MIN: CPT | Mod: S$GLB,,, | Performed by: UROLOGY

## 2019-09-26 PROCEDURE — 3079F DIAST BP 80-89 MM HG: CPT | Mod: CPTII,S$GLB,, | Performed by: UROLOGY

## 2019-09-26 PROCEDURE — 99999 PR PBB SHADOW E&M-EST. PATIENT-LVL III: CPT | Mod: PBBFAC,,, | Performed by: UROLOGY

## 2019-09-26 PROCEDURE — 99999 PR PBB SHADOW E&M-EST. PATIENT-LVL III: ICD-10-PCS | Mod: PBBFAC,,, | Performed by: UROLOGY

## 2019-09-26 PROCEDURE — 99213 PR OFFICE/OUTPT VISIT, EST, LEVL III, 20-29 MIN: ICD-10-PCS | Mod: S$GLB,,, | Performed by: UROLOGY

## 2019-09-26 PROCEDURE — 3074F SYST BP LT 130 MM HG: CPT | Mod: CPTII,S$GLB,, | Performed by: UROLOGY

## 2019-09-26 PROCEDURE — 1101F PT FALLS ASSESS-DOCD LE1/YR: CPT | Mod: CPTII,S$GLB,, | Performed by: UROLOGY

## 2019-09-26 NOTE — PROGRESS NOTES
"  Subjective:       Salome Moore is a 83 y.o. female who is an established pt who was self-referred for evaluation of IC.      She reports that she has had IC her "whole life." She takes Macrodantin daily for many, many years. She has been getting her care at [place] for many years but has decided to switch to  doctors. Her previous urologist was Dr Cox.      She reports that she used to have urethral dilations for "infantile urethra." She has been on and off the abx prophylaxis. She has issues with getting the Macrodantin covered by her insurance. Her symptoms are characterized by burning and pain with urination. She continues to take hormones.    She was given Bactrim SS for prophylaxis at previous visit (5/15). She was interested in urethral dilations though I would need to do cystoscopy prior to this as efficacy is questionable.     She has been doing great since last visit. No UTIs.        The following portions of the patient's history were reviewed and updated as appropriate: allergies, current medications, past family history, past medical history, past social history, past surgical history and problem list.    Review of Systems  Constitutional: no fever or chills  ENT: no nasal congestion or sore throat  Respiratory: no cough or shortness of breath  Cardiovascular: no chest pain or palpitations  Gastrointestinal: no nausea or vomiting, tolerating diet  Genitourinary: as per HPI  Hematologic/Lymphatic: no easy bruising or lymphadenopathy  Musculoskeletal: no arthralgias or myalgias  Skin: no rashes or lesions  Neurological: no seizures or tremors  Behavioral/Psych: no auditory or visual hallucinations       Objective:    Vitals:   /83   Ht 5' 4" (1.626 m)   Wt 54.9 kg (121 lb)   BMI 20.77 kg/m²     Physical Exam   General: well developed, well nourished in no acute distress  Head: normocephalic, atraumatic  Neck: supple, trachea midline, no obvious enlargement of thyroid  HEENT: EOMI, " mucus membranes moist, sclera anicteric, no hearing impairment  Lungs: symmetric expansion, non-labored breathing  Cardiovascular: regular rate and rhythm, normal pulses  Abdomen: soft, non tender, non distended, no palpable masses, no hernias, no CVA tenderness  Musculoskeletal: no peripheral edema, normal ROM in bilateral upper and lower extremities  Lymphatics: no cervical or inguinal lymphadenopathy  Skin: no rashes or lesions  Neuro: alert and oriented x 3, no gross deficits  Psych: normal judgment and insight, normal mood/affect and non-anxious  Genitourinary:   patient declined exam    Lab Review    Urine analysis today in clinic shows - trace protein    No results found for: WBC, HGB, HCT, MCV, PLT  No results found for: CREATININE, BUN     Imaging  NA       Assessment/Plan:      1. Interstitial cystitis    - Bactrim SS for prophylaxis, she would like to continue this   - Doing great   - Discussed possibility of cystoscopy to evaluate urethra. She would like to hold on this.    - She is interested in urethral dilations. I prefer to hold on this as unsure its efficacy. Recommend cysto prior to this. Will hold on this for now.        Follow up in 6 months (prefers 6 mth f/u)

## 2019-10-24 ENCOUNTER — TELEPHONE (OUTPATIENT)
Dept: OBSTETRICS AND GYNECOLOGY | Facility: CLINIC | Age: 84
End: 2019-10-24

## 2019-10-24 NOTE — TELEPHONE ENCOUNTER
----- Message from Stephania Becerra sent at 10/24/2019  2:33 PM CDT -----  Contact: pt  Type: Patient Call Back    Who called:pt    What is the request in detail:pt wants to know if the ct scan results are in for her appt for tomorrow. Call pt    Can the clinic reply by [facility]?    Would the patient rather a call back or a response via [facility]? Call back    Best call back number:[phone]        Patient is aware that we have her results

## 2019-10-24 NOTE — TELEPHONE ENCOUNTER
----- Message from Ethel Pastrana sent at 10/24/2019  8:17 AM CDT -----  Contact: self  Type: Patient Call Back    Who called:self    What is the request in detail:pt has some issues that she was told by a specialist that she needs to be seen asap. She has appt on 11/7 but  said that's no soon enough.     Can the clinic reply by [facility]?no    Would the patient rather a call back or a response via [facility]? call    Best call back number   cell       Patient stated she nweeded to see dr Hoang GALVIN per her gastro drLesly Patient has been fit in to the schedule on 10/25/2019

## 2019-10-25 ENCOUNTER — OFFICE VISIT (OUTPATIENT)
Dept: OBSTETRICS AND GYNECOLOGY | Facility: CLINIC | Age: 84
End: 2019-10-25
Payer: MEDICARE

## 2019-10-25 VITALS
HEIGHT: 64 IN | DIASTOLIC BLOOD PRESSURE: 60 MMHG | WEIGHT: 119.25 LBS | SYSTOLIC BLOOD PRESSURE: 125 MMHG | BODY MASS INDEX: 20.36 KG/M2

## 2019-10-25 DIAGNOSIS — N85.8 UTERINE MASS: Primary | ICD-10-CM

## 2019-10-25 PROCEDURE — 1101F PT FALLS ASSESS-DOCD LE1/YR: CPT | Mod: CPTII,S$GLB,, | Performed by: OBSTETRICS & GYNECOLOGY

## 2019-10-25 PROCEDURE — 87481 CANDIDA DNA AMP PROBE: CPT | Mod: 59

## 2019-10-25 PROCEDURE — 87661 TRICHOMONAS VAGINALIS AMPLIF: CPT

## 2019-10-25 PROCEDURE — 99999 PR PBB SHADOW E&M-EST. PATIENT-LVL III: CPT | Mod: PBBFAC,,, | Performed by: OBSTETRICS & GYNECOLOGY

## 2019-10-25 PROCEDURE — 3078F DIAST BP <80 MM HG: CPT | Mod: CPTII,S$GLB,, | Performed by: OBSTETRICS & GYNECOLOGY

## 2019-10-25 PROCEDURE — 3074F SYST BP LT 130 MM HG: CPT | Mod: CPTII,S$GLB,, | Performed by: OBSTETRICS & GYNECOLOGY

## 2019-10-25 PROCEDURE — 58100 PR BIOPSY OF UTERUS LINING: ICD-10-PCS | Mod: 52,S$GLB,, | Performed by: OBSTETRICS & GYNECOLOGY

## 2019-10-25 PROCEDURE — 3074F PR MOST RECENT SYSTOLIC BLOOD PRESSURE < 130 MM HG: ICD-10-PCS | Mod: CPTII,S$GLB,, | Performed by: OBSTETRICS & GYNECOLOGY

## 2019-10-25 PROCEDURE — 58100 BIOPSY OF UTERUS LINING: CPT | Mod: 52,S$GLB,, | Performed by: OBSTETRICS & GYNECOLOGY

## 2019-10-25 PROCEDURE — 3078F PR MOST RECENT DIASTOLIC BLOOD PRESSURE < 80 MM HG: ICD-10-PCS | Mod: CPTII,S$GLB,, | Performed by: OBSTETRICS & GYNECOLOGY

## 2019-10-25 PROCEDURE — 99999 PR PBB SHADOW E&M-EST. PATIENT-LVL III: ICD-10-PCS | Mod: PBBFAC,,, | Performed by: OBSTETRICS & GYNECOLOGY

## 2019-10-25 PROCEDURE — 99214 OFFICE O/P EST MOD 30 MIN: CPT | Mod: 25,S$GLB,, | Performed by: OBSTETRICS & GYNECOLOGY

## 2019-10-25 PROCEDURE — 87801 DETECT AGNT MULT DNA AMPLI: CPT

## 2019-10-25 PROCEDURE — 99214 PR OFFICE/OUTPT VISIT, EST, LEVL IV, 30-39 MIN: ICD-10-PCS | Mod: 25,S$GLB,, | Performed by: OBSTETRICS & GYNECOLOGY

## 2019-10-25 PROCEDURE — 1101F PR PT FALLS ASSESS DOC 0-1 FALLS W/OUT INJ PAST YR: ICD-10-PCS | Mod: CPTII,S$GLB,, | Performed by: OBSTETRICS & GYNECOLOGY

## 2019-10-25 RX ORDER — DICYCLOMINE HYDROCHLORIDE 10 MG/1
CAPSULE ORAL
Refills: 5 | COMMUNITY
Start: 2019-10-10

## 2019-10-25 RX ORDER — HYDROCODONE BITARTRATE AND ACETAMINOPHEN 5; 325 MG/1; MG/1
1 TABLET ORAL EVERY 6 HOURS PRN
Qty: 10 TABLET | Refills: 0 | Status: SHIPPED | OUTPATIENT
Start: 2019-10-25 | End: 2020-02-05 | Stop reason: CLARIF

## 2019-10-25 RX ORDER — ERYTHROMYCIN 20 MG/ML
1 SOLUTION TOPICAL NIGHTLY
Refills: 3 | COMMUNITY
Start: 2019-10-16

## 2019-10-25 NOTE — PATIENT INSTRUCTIONS
Endometrial Biopsy    Endometrial biopsy is a procedure used to study the endometrium (lining of the uterus). It is usually done in your health care providers office. During the biopsy, small tissue samples are taken from inside the uterus. These are then sent to a lab for study. If any problems are found, you and your health care provider will discuss treatment options. The biopsy usually takes only a few minutes, and you can often go back to your normal routine as soon as the procedure is over.  Reasons for the procedure  Endometrial biopsy may help pinpoint the cause of certain problems. These include:  · Bleeding after menopause  · Heavy or irregular menstrual periods  · Bleeding associated with hormone therapy  · Prolonged bleeding  · Abnormal Pap test results  · Having certain types of cancer  · Trouble getting pregnant (fertility problems)  What are the risks?  Problems with endometrial biopsy are rare, but can include:  · Bleeding  · Infection  · Damage to the uterine wall (very rare)  Getting ready for the procedure  Your health care provider will ask about your health and any medicines you take, like blood thinners. Before your biopsy, you may have tests to make sure youre not pregnant or have an infection. You may also be asked to sign a consent form. A day or 2 before the procedure:   · Avoid using creams or other vaginal medicines.  · Avoid douching.  · Ask your health care provider if you should take pain medicines shortly before the test.  During the biopsy  During the biopsy, you will likely experience the following:  · You will be asked to lie on an exam table with your knees bent, just as you do for a Pap test.  · You may have a brief pelvic exam. An instrument called a speculum is then inserted into the vagina to hold it open.  · An antiseptic solution may be applied to the cervix. The cervix may also be numbed with an anesthetic or dilated to widen the opening.  · A small tube is passed  through the cervix into the uterus.  · It is normal to feel some cramping when the tube is inserted. But tell your health care provider if you have severe cramping or are very uncomfortable.  · Using mild suction, samples are taken from the uterine lining. You may feel pinching or additional cramping when this is done.  · The tube and speculum are then removed and the samples are sent to a lab for study.  After the procedure  After the procedure, you may experience the following:  · If you feel lightheaded or dizzy, you can rest on the table until youre ready to get dressed.  · For a few hours, you may feel some mild cramping. This can usually be relieved with over-the-counter pain medicines.  · You may have some bleeding for a few days. Use pads instead of tampons.  · Dont douche or use any vaginal medicines unless your health care provider says its OK.  · Ask your health care provider when its OK to have sex again.  Follow-up care  It will take about a week for the biopsy results to come back from the lab. Then you and your health care provider can discuss the results. These may show that no treatment is required. Or, you may be scheduled for a follow-up appointment and more tests. If your biopsy was done for fertility problems, be sure to record the day when your next period begins.     Call your health care provider   Contact your health care provider if you have any of the following:  · Heavy bleeding (more than a pad an hour for 2 hours).  · Severe cramping or increasing pain.  · Fever over 100.4°F (38.0°C)  · Foul-smelling or unusual vaginal discharge.   Date Last Reviewed: 5/10/2015  © 4255-4513 [employer]. [address] [place] [address], [place] [address]. All rights reserved. This information is not intended as a substitute for professional medical care. Always follow your healthcare professional's instructions.

## 2019-10-28 LAB
BACTERIAL VAGINOSIS DNA: NORMAL
CANDIDA GLABRATA DNA: NORMAL
CANDIDA KRUSEI DNA: NORMAL
CANDIDA RRNA VAG QL PROBE: NORMAL
T VAGINALIS RRNA GENITAL QL PROBE: NORMAL

## 2019-10-29 NOTE — PROGRESS NOTES
DATE: August 25th, 2019    TIME: 10:30 AM    PROCEDURE: Endometrial biopsy    INDICATION: Uterine Mass    PATIENT CONSENT:     PRE ENDOMETRIAL BIOPSY COUNSELING:  The patient was informed of the risk of bleeding, infection, uterine perforation and pain and that the test will rule-out endometrial cancer with accuracy greater than 95%. She was counseled on the alternatives to endometrial biopsy. All of her questions were answered.  Patient gives verbal consent    ANESTHESIA: None    Labs: UPT performed prior to the procedure was negative.    PROCEDURE NOTE:  The cervix was visualized with a speculum and swabbed with Betadinex3.  The anterior lip of the cervix was grasped with a single toothed tenaculum. A sterile endometrial pipelle was attempted to be inserted into the external cervical os but the os was stenotic. An os finder was then used to attempt to dilate te cervix without success. At this time, the procedure was aborted.    COMPLICATIONS: None    DISPOSITION: The patient tolerated the above procedure fairly well.      POST ENDOMETRIAL BIOPSY COUNSELING:  - Manage post biopsy cramping with NSAIDs or Tylenol.  - Expect spotting or light bleeding for a few days.  - Report bleeding heavier than a period, fever > 101.0 F, worsening pain or a foul smelling vaginal discharge.      Emelia Galan MD 10/28/2019 10:55 PM

## 2019-10-29 NOTE — PROGRESS NOTES
"History & Physical  Gynecology      SUBJECTIVE:     Chief Complaint: Results       History of Present Illness:  Ms. Salome Moore is a 84 y/o female who presents to clinic as a referral for uterine mass found on CT scan during a GI vist. Patient was seen at [facility] for abdominal pain and nausea. 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 She reports that she has been tolerating a regular diet. She does admit that she had been experiencing nausea over the past few months which is why she contacted the GI clinic.       Review of patient's allergies indicates:   Allergen Reactions    Pcn [penicillins] Other (See Comments)     Patient can't remember the reaction. Reaction occurred over 60 years       Past Medical History:   Diagnosis Date    Basal cell carcinoma     Cataract     Cystitis     Glaucoma     Hypertension      Past Surgical History:   Procedure Laterality Date    BREAST SURGERY      augmentation    CATARACT EXTRACTION W/  INTRAOCULAR LENS IMPLANT Bilateral     VAGINAL DELIVERY       OB History        5    Para        Term                AB        Living   3       SAB        TAB        Ectopic        Multiple        Live Births                   Family History   Problem Relation Age of Onset    Hypertension Father     Cataracts Father     Cataracts Mother     Amblyopia Neg Hx     Blindness Neg Hx     Glaucoma Neg Hx     Macular degeneration Neg Hx     Retinal detachment Neg Hx     Strabismus Neg Hx      Social History     Tobacco Use    Smoking status: Never Smoker    Smokeless tobacco: Never Used   Substance " Use Topics    Alcohol use: No     Frequency: Never    Drug use: No       Current Outpatient Medications   Medication Sig    amitriptyline (ELAVIL) 50 MG tablet as needed.     dicyclomine (BENTYL) 10 MG capsule TAKE 1 CAPSULE BY MOUTH 3 TIMES A DAY AS NEEDED FOR PAIN    erythromycin with ethanol (THERAMYCIN) 2 % external solution 1 application every evening. Apply to affected area    losartan (COZAAR) 100 MG tablet once daily.     sulfamethoxazole-trimethoprim 400-80mg (BACTRIM,SEPTRA) 400-80 mg per tablet Take 1 tablet by mouth once daily.    TAZORAC 0.1 % Gel gel once daily.     zolpidem (AMBIEN) 10 mg Tab every evening.     HYDROcodone-acetaminophen (NORCO) 5-325 mg per tablet Take 1 tablet by mouth every 6 (six) hours as needed for Pain.    latanoprost 0.005 % ophthalmic solution Place 1 drop into the left eye nightly.     No current facility-administered medications for this visit.        Review of Systems:  Review of Systems   Constitutional: Negative for chills and fever.   Respiratory: Negative for cough and wheezing.    Cardiovascular: Negative for chest pain and palpitations.   Gastrointestinal: Positive for nausea. Negative for abdominal pain and vomiting.   Genitourinary: Positive for vaginal discharge. Negative for dysuria, frequency, hematuria, pelvic pain, vaginal bleeding and vaginal pain.        OBJECTIVE:     Physical Exam:  Physical Exam   Constitutional: She is oriented to person, place, and time. She appears well-developed and well-nourished.   Cardiovascular: Normal rate.   Pulmonary/Chest: Effort normal.   Abdominal: Soft.   Genitourinary: Vaginal discharge found.   Genitourinary Comments: Water, yellow d/c, 10-12w size uterus   Neurological: She is alert and oriented to person, place, and time.   Skin: Skin is warm and dry.   Psychiatric: She has a normal mood and affect.   Nursing note and vitals reviewed.      ASSESSMENT:       ICD-10-CM ICD-9-CM    1. Uterine mass N85.8 625.8  HYDROcodone-acetaminophen (NORCO) 5-325 mg per tablet      US Pelvis Comp with Transvag NON-OB (xpd      Vaginosis Screen by DNA Probe      Ambulatory Referral to Gynecologic Oncology          Plan:      Salome was seen today for results.    Diagnoses and all orders for this visit:    Uterine mass  -     HYDROcodone-acetaminophen (NORCO) 5-325 mg per tablet; Take 1 tablet by mouth every 6 (six) hours as needed for Pain.  -     US Pelvis Comp with Transvag NON-OB (xpd; Future  -     Vaginosis Screen by DNA Probe  - Attempted EMBx today but was not completed 2/2 cervical stenosis  - Ambulatory consult to Gyn Onc    Orders Placed This Encounter   Procedures    Vaginosis Screen by DNA Probe    US Pelvis Comp with Transvag NON-OB (xpd    Ambulatory Referral to Gynecologic Oncology       Follow up if symptoms worsen or fail to improve.     Counseling time: 30 minutes    Emelia Galan

## 2019-10-30 ENCOUNTER — HOSPITAL ENCOUNTER (OUTPATIENT)
Dept: RADIOLOGY | Facility: HOSPITAL | Age: 84
Discharge: HOME | End: 2019-10-30
Attending: OBSTETRICS & GYNECOLOGY
Payer: MEDICARE

## 2019-10-30 DIAGNOSIS — N85.8 UTERINE MASS: ICD-10-CM

## 2019-10-30 PROCEDURE — 76830 TRANSVAGINAL US NON-OB: CPT | Mod: TC

## 2019-10-30 PROCEDURE — 76830 US PELVIS COMP WITH TRANSVAG NON-OB (XPD): ICD-10-PCS | Mod: 26,,, | Performed by: RADIOLOGY

## 2019-10-30 PROCEDURE — 76830 TRANSVAGINAL US NON-OB: CPT | Mod: 26,,, | Performed by: RADIOLOGY

## 2019-10-30 PROCEDURE — 76856 US EXAM PELVIC COMPLETE: CPT | Mod: 26,,, | Performed by: RADIOLOGY

## 2019-10-30 PROCEDURE — 76856 US PELVIS COMP WITH TRANSVAG NON-OB (XPD): ICD-10-PCS | Mod: 26,,, | Performed by: RADIOLOGY

## 2019-11-01 ENCOUNTER — TELEPHONE (OUTPATIENT)
Dept: OBSTETRICS AND GYNECOLOGY | Facility: CLINIC | Age: 84
End: 2019-11-01

## 2019-11-01 NOTE — TELEPHONE ENCOUNTER
"Discussed with results with patient. Will make GYN ONC appt for patient ASAP. Patient unhappy about TVUS experience reporting that the techs were "unable to find the mass" then asked her if she knew where they were. She then called about an appt that was scheduled on 11/07 and was told appt was cancelled then rescheduled for the 8th.          TVUS   FINDINGS:  Uterus:  Measures 9.3 x 6.7 x 7.9 cm in dimensions.  Multiple uterine masses noted, possible fibroids.  One in the posterior body measuring 2.6 cm, adjacent posterior body measuring 2.3 cm, and anterior body measuring 5.7 cm..  The endometrium is normal thickness measuring 4 mm, noting a portion is obscured by overlying masses.    Ovaries:  The ovaries are not visualized.    Free Fluid:  None.      Impression       Enlarged heterogeneous uterus containing multiple masses, possible fibroids.  Leiomyosarcoma not excluded.  Endometrial thickness within normal limits, noting a portion is obscured.  Short-term imaging follow-up could be performed to ensure stability or further evaluation with MRI, if indicated.         Emelia Bolton MD    "

## 2019-11-04 ENCOUNTER — TELEPHONE (OUTPATIENT)
Dept: ADMINISTRATIVE | Facility: OTHER | Age: 84
End: 2019-11-04

## 2019-11-11 ENCOUNTER — TELEPHONE (OUTPATIENT)
Dept: GYNECOLOGIC ONCOLOGY | Facility: CLINIC | Age: 84
End: 2019-11-11

## 2019-11-11 ENCOUNTER — DOCUMENTATION ONLY (OUTPATIENT)
Dept: GYNECOLOGIC ONCOLOGY | Facility: HOSPITAL | Age: 84
End: 2019-11-11

## 2019-11-12 ENCOUNTER — LAB VISIT (OUTPATIENT)
Dept: LAB | Facility: OTHER | Age: 84
End: 2019-11-12
Payer: MEDICARE

## 2019-11-12 ENCOUNTER — INITIAL CONSULT (OUTPATIENT)
Dept: GYNECOLOGIC ONCOLOGY | Facility: CLINIC | Age: 84
End: 2019-11-12
Payer: MEDICARE

## 2019-11-12 VITALS
WEIGHT: 121.94 LBS | BODY MASS INDEX: 20.82 KG/M2 | DIASTOLIC BLOOD PRESSURE: 63 MMHG | SYSTOLIC BLOOD PRESSURE: 141 MMHG | HEIGHT: 64 IN | HEART RATE: 112 BPM

## 2019-11-12 DIAGNOSIS — R10.2 PELVIC PAIN: ICD-10-CM

## 2019-11-12 DIAGNOSIS — R59.9 ADENOPATHY: ICD-10-CM

## 2019-11-12 DIAGNOSIS — N85.8 UTERINE MASS: ICD-10-CM

## 2019-11-12 DIAGNOSIS — R59.9 ADENOPATHY: Primary | ICD-10-CM

## 2019-11-12 LAB
CREAT SERPL-MCNC: 0.8 MG/DL (ref 0.5–1.4)
EST. GFR  (AFRICAN AMERICAN): >60 ML/MIN/1.73 M^2
EST. GFR  (NON AFRICAN AMERICAN): >60 ML/MIN/1.73 M^2

## 2019-11-12 PROCEDURE — 3288F FALL RISK ASSESSMENT DOCD: CPT | Mod: CPTII,S$GLB,, | Performed by: OBSTETRICS & GYNECOLOGY

## 2019-11-12 PROCEDURE — 99999 PR PBB SHADOW E&M-EST. PATIENT-LVL III: CPT | Mod: PBBFAC,,, | Performed by: OBSTETRICS & GYNECOLOGY

## 2019-11-12 PROCEDURE — 1100F PTFALLS ASSESS-DOCD GE2>/YR: CPT | Mod: CPTII,S$GLB,, | Performed by: OBSTETRICS & GYNECOLOGY

## 2019-11-12 PROCEDURE — 3288F PR FALLS RISK ASSESSMENT DOCUMENTED: ICD-10-PCS | Mod: CPTII,S$GLB,, | Performed by: OBSTETRICS & GYNECOLOGY

## 2019-11-12 PROCEDURE — 82565 ASSAY OF CREATININE: CPT

## 2019-11-12 PROCEDURE — 36415 COLL VENOUS BLD VENIPUNCTURE: CPT

## 2019-11-12 PROCEDURE — 99205 PR OFFICE/OUTPT VISIT, NEW, LEVL V, 60-74 MIN: ICD-10-PCS | Mod: S$GLB,,, | Performed by: OBSTETRICS & GYNECOLOGY

## 2019-11-12 PROCEDURE — 99205 OFFICE O/P NEW HI 60 MIN: CPT | Mod: S$GLB,,, | Performed by: OBSTETRICS & GYNECOLOGY

## 2019-11-12 PROCEDURE — 1100F PR PT FALLS ASSESS DOC 2+ FALLS/FALL W/INJURY/YR: ICD-10-PCS | Mod: CPTII,S$GLB,, | Performed by: OBSTETRICS & GYNECOLOGY

## 2019-11-12 PROCEDURE — 3078F DIAST BP <80 MM HG: CPT | Mod: CPTII,S$GLB,, | Performed by: OBSTETRICS & GYNECOLOGY

## 2019-11-12 PROCEDURE — 3078F PR MOST RECENT DIASTOLIC BLOOD PRESSURE < 80 MM HG: ICD-10-PCS | Mod: CPTII,S$GLB,, | Performed by: OBSTETRICS & GYNECOLOGY

## 2019-11-12 PROCEDURE — 99999 PR PBB SHADOW E&M-EST. PATIENT-LVL III: ICD-10-PCS | Mod: PBBFAC,,, | Performed by: OBSTETRICS & GYNECOLOGY

## 2019-11-12 PROCEDURE — 3077F PR MOST RECENT SYSTOLIC BLOOD PRESSURE >= 140 MM HG: ICD-10-PCS | Mod: CPTII,S$GLB,, | Performed by: OBSTETRICS & GYNECOLOGY

## 2019-11-12 PROCEDURE — 3077F SYST BP >= 140 MM HG: CPT | Mod: CPTII,S$GLB,, | Performed by: OBSTETRICS & GYNECOLOGY

## 2019-11-12 RX ORDER — ACETAMINOPHEN AND CODEINE PHOSPHATE 300; 30 MG/1; MG/1
1 TABLET ORAL EVERY 6 HOURS PRN
Qty: 30 TABLET | Refills: 0 | Status: SHIPPED | OUTPATIENT
Start: 2019-11-12 | End: 2019-11-22

## 2019-11-12 NOTE — LETTER
November 14, 2019      Emelia Galan MD  [address] [facility] [address]  [address]  [facility] [address]           [facility] [address]  [address], [address]  [facility] [address]  Phone: [phone]  Fax: [phone]          Patient: Salome Moore   MR Number: [id number]   YOB: 1935   Date of Visit: 11/12/2019       Dear Dr. Emelia Galan:    Thank you for referring Salome Moore to me for evaluation. Attached you will find relevant portions of my assessment and plan of care.    If you have questions, please do not hesitate to call me. I look forward to following Salome Moore along with you.    Sincerely,    Stephany Arredondo MD    Enclosure  CC:  No Recipients    If you would like to receive this communication electronically, please contact [email or web address] or [phone] to request more information on [employer] access.    For providers and/or their staff who would like to refer a patient to [facility], please contact us through our one-stop-shop provider referral line, [facility], at [phone].    If you feel you have received this communication in error or would no longer like to receive these types of communications, please e-mail [email or web address]

## 2019-11-12 NOTE — PROGRESS NOTES
Subjective:      Patient ID: Salome Moore is a 84 y.o. female.    Chief Complaint: new consult      HPI     84 yr old para 3 referred from Dr. Jones for a uterine mass concerning for malignancy. Uterine mass found on CT after presenting to GI complaining of nausea and abdominal pain of a few months duration. No VB.     CT per [facility] notes  11 cm uterus with large hypoattenuating, infiltrating mass measuring 7.7 x 10 cm. Cervical canal distended with small scattered right pelvic side wall lymph nodes present. 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     Pelvic US 10/30/2019  Uterus 9.3 x 6.7 x 7.9 cm, multiple uterine masses noted, possibly fibroids, 2.3 - 5.7 cm. ES normal thickness 4mm, portion obscured by overlying mass.  Ovaries not visualized  No free fluid      EMBx attempted and aborted due to cervical stenosis. Watery, yellow d/c noted on exam.     She presents for further evaluation. She reports no known history for fibroids.     LMP early 60s on HRT until last year.     No prior pelvic or abdominal surgeries.     Family history negative for breast, uterine, ovarian and colon cancer.    Review of Systems   Constitutional: Positive for appetite change (decreased) and unexpected weight change (4-5lb decrease). Negative for chills, diaphoresis, fatigue and fever.   Respiratory: Negative for cough, chest tightness, shortness of breath and wheezing.    Cardiovascular: Negative for chest pain, palpitations and leg swelling.   Gastrointestinal: Positive for abdominal pain, constipation and nausea. Negative for abdominal distention, blood in stool, diarrhea and vomiting.   Genitourinary: Positive for pelvic pain and vaginal discharge. Negative for difficulty urinating, dysuria, flank pain, frequency, hematuria, vaginal bleeding and vaginal pain.   Musculoskeletal: Negative for arthralgias and back pain.   Skin:  Negative for color change and rash.   Neurological: Negative for dizziness, weakness, numbness and headaches.   Hematological: Negative for adenopathy.   Psychiatric/Behavioral: Negative for confusion and sleep disturbance. The patient is not nervous/anxious.        Past Medical History:   Diagnosis Date    Basal cell carcinoma     Cataract     Cystitis     Glaucoma     Hypertension      Past Surgical History:   Procedure Laterality Date    BREAST SURGERY      augmentation    CATARACT EXTRACTION W/  INTRAOCULAR LENS IMPLANT Bilateral     VAGINAL DELIVERY       Family History   Problem Relation Age of Onset    Hypertension Father     Cataracts Father     Cataracts Mother     Amblyopia Neg Hx     Blindness Neg Hx     Glaucoma Neg Hx     Macular degeneration Neg Hx     Retinal detachment Neg Hx     Strabismus Neg Hx      Social History     Socioeconomic History    Marital status:      Spouse name: Not on file    Number of children: Not on file    Years of education: Not on file    Highest education level: Not on file   Occupational History    Not on file   Social Needs    Financial resource strain: Not on file    Food insecurity:     Worry: Not on file     Inability: Not on file    Transportation needs:     Medical: Not on file     Non-medical: Not on file   Tobacco Use    Smoking status: Never Smoker    Smokeless tobacco: Never Used   Substance and Sexual Activity    Alcohol use: No     Frequency: Never    Drug use: No    Sexual activity: Not Currently     Partners: Male   Lifestyle    Physical activity:     Days per week: Not on file     Minutes per session: Not on file    Stress: Not on file   Relationships    Social connections:     Talks on phone: Not on file     Gets together: Not on file     Attends Anabaptist service: Not on file     Active member of club or organization: Not on file     Attends meetings of clubs or organizations: Not on file     Relationship status: Not  "on file   Other Topics Concern    Not on file   Social History Narrative    Not on file     Current Outpatient Medications   Medication Sig    acetaminophen-codeine 300-30mg (TYLENOL #3) 300-30 mg Tab Take 1 tablet by mouth every 6 (six) hours as needed.    amitriptyline (ELAVIL) 50 MG tablet as needed.     dicyclomine (BENTYL) 10 MG capsule TAKE 1 CAPSULE BY MOUTH 3 TIMES A DAY AS NEEDED FOR PAIN    erythromycin with ethanol (THERAMYCIN) 2 % external solution 1 application every evening. Apply to affected area    HYDROcodone-acetaminophen (NORCO) 5-325 mg per tablet Take 1 tablet by mouth every 6 (six) hours as needed for Pain.    latanoprost 0.005 % ophthalmic solution Place 1 drop into the left eye nightly.    losartan (COZAAR) 100 MG tablet once daily.     sulfamethoxazole-trimethoprim 400-80mg (BACTRIM,SEPTRA) 400-80 mg per tablet Take 1 tablet by mouth once daily.    TAZORAC 0.1 % Gel gel once daily.     zolpidem (AMBIEN) 10 mg Tab every evening.      No current facility-administered medications for this visit.      Review of patient's allergies indicates:   Allergen Reactions    Pcn [penicillins] Other (See Comments)     Patient can't remember the reaction. Reaction occurred over 60 years   BP (!) 141/63 (BP Location: Left arm, Patient Position: Sitting, BP Method: Large (Automatic))   Pulse (!) 112   Ht 5' 4" (1.626 m)   Wt 55.3 kg (121 lb 14.6 oz)   BMI 20.93 kg/m²       Objective:   Physical Exam:   Constitutional: She is oriented to person, place, and time. She appears well-developed and well-nourished. No distress.    HENT:   Head: Normocephalic and atraumatic.    Eyes: No scleral icterus.    Neck: Normal range of motion. Neck supple.    Cardiovascular: Exam reveals no cyanosis and no edema.     Pulmonary/Chest: Effort normal. No respiratory distress. She exhibits no tenderness.        Abdominal: Soft. Normal appearance. She exhibits no distension, no fluid wave, no ascites and no mass. " "There is no tenderness. There is no rigidity, no rebound and no guarding. No hernia.     Genitourinary: Pelvic exam was performed with patient supine. There is no rash, tenderness or lesion on the right labia. There is no rash, tenderness or lesion on the left labia. Uterus is enlarged. Uterus is not fixed and not tender. Cervix is normal. Right adnexum displays no mass, no tenderness and no fullness. Left adnexum displays no mass, no tenderness and no fullness. No bleeding in the vagina. Vaginal discharge found.   Genitourinary Comments: Bulky mobile uterus        Uterus Size: 10 cm   Musculoskeletal: Normal range of motion and moves all extremeties. She exhibits no edema.      Lymphadenopathy:     She has no cervical adenopathy.        Right: No inguinal adenopathy present.        Left: No inguinal adenopathy present.    Neurological: She is alert and oriented to person, place, and time.    Skin: Skin is warm and dry. No rash noted. No cyanosis or erythema. No pallor.    Psychiatric: She has a normal mood and affect. Thought content normal.       Assessment:     1. Adenopathy    2. Pelvic pain    3. Uterine mass        Plan:       I reviewed the constellation of findings with the patient and concerns for malignant process of unknown origin. Gynecologic is a likely possibility. Given adenopathy may be an advanced process. We discussed standard approaches to treatment for malignant gynecologic conditions which include surgery and chemotherapy and sometimes radiation. She expresses that at 84 she is really not interested in pursuing any type of treatment or interventions. However she would like to know more about "whats going on" for prognostic and planning purposes. Discussed least invasive approaches to try and achieve a diagnosis such as D&C or IR biopsy if accessible for tissue diagnosis. She agrees to start with an in house CT for review. Further treatment planning pending results. The patient and her daughter " were allowed to ask questions and all were answered to their satisfaction.     CT A&P

## 2019-11-12 NOTE — PROGRESS NOTES
84 yr old para 3 referred from Dr. Jones for a uterine mass concerning for malignancy. Uterine mass found on CT after presenting to GI complaining of nausea and abdominal pain of a few months duration. No VB.    CT per [facility] notes  11 cm uterus with large hypoattenuating, infiltrating mass measuring 7.7 x 10 cm. Cervical canal distended with small scattered right pelvic side wall lymph nodes present. 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    Pelvic US 10/30/2019  Uterus 9.3 x 6.7 x 7.9 cm, multiple uterine masses noted, possibly fibroids, 2.3 - 5.7 cm. ES normal thickness 4mm, portion obscured by overlying mass.  Ovaries not visualized  No free fluid      EMBx attempted and aborted due to cervical stenosis. Watery, yellow d/c noted on exam.    She presents for further evaluation.    LMP age.     No prior pelvic or abdominal surgeries.    Family history negative for breast, uterine, ovarian and colon cancer.

## 2019-11-15 ENCOUNTER — HOSPITAL ENCOUNTER (OUTPATIENT)
Dept: RADIOLOGY | Facility: HOSPITAL | Age: 84
Discharge: HOME | End: 2019-11-15
Attending: OBSTETRICS & GYNECOLOGY
Payer: MEDICARE

## 2019-11-15 DIAGNOSIS — R59.9 ADENOPATHY: ICD-10-CM

## 2019-11-15 PROCEDURE — 74177 CT ABD & PELVIS W/CONTRAST: CPT | Mod: TC

## 2019-11-15 PROCEDURE — 25500020 PHARM REV CODE 255: Performed by: OBSTETRICS & GYNECOLOGY

## 2019-11-15 PROCEDURE — 74177 CT ABDOMEN PELVIS WITH CONTRAST: ICD-10-PCS | Mod: 26,,, | Performed by: RADIOLOGY

## 2019-11-15 PROCEDURE — 74177 CT ABD & PELVIS W/CONTRAST: CPT | Mod: 26,,, | Performed by: RADIOLOGY

## 2019-11-15 RX ADMIN — IOHEXOL 15 ML: 300 INJECTION, SOLUTION INTRAVENOUS at 11:11

## 2019-11-15 RX ADMIN — IOHEXOL 75 ML: 350 INJECTION, SOLUTION INTRAVENOUS at 12:11

## 2019-11-18 ENCOUNTER — TELEPHONE (OUTPATIENT)
Dept: GYNECOLOGIC ONCOLOGY | Facility: CLINIC | Age: 84
End: 2019-11-18

## 2019-11-19 ENCOUNTER — TELEPHONE (OUTPATIENT)
Dept: GYNECOLOGIC ONCOLOGY | Facility: CLINIC | Age: 84
End: 2019-11-19

## 2019-11-19 NOTE — TELEPHONE ENCOUNTER
Called and reviewed CT. Uterine mass noted, no adenopathy as described on outside reports. Will plan to have a return office visit to discuss further treatment planning options. She agrees and voices understanding.     Message routed to staff for a return visit.

## 2019-11-20 ENCOUNTER — OFFICE VISIT (OUTPATIENT)
Dept: GYNECOLOGIC ONCOLOGY | Facility: CLINIC | Age: 84
End: 2019-11-20
Payer: MEDICARE

## 2019-11-20 VITALS
HEART RATE: 78 BPM | HEIGHT: 64 IN | WEIGHT: 121.5 LBS | SYSTOLIC BLOOD PRESSURE: 139 MMHG | BODY MASS INDEX: 20.74 KG/M2 | DIASTOLIC BLOOD PRESSURE: 60 MMHG

## 2019-11-20 DIAGNOSIS — N85.8 UTERINE MASS: Primary | ICD-10-CM

## 2019-11-20 PROCEDURE — 1125F PR PAIN SEVERITY QUANTIFIED, PAIN PRESENT: ICD-10-PCS | Mod: S$GLB,,, | Performed by: OBSTETRICS & GYNECOLOGY

## 2019-11-20 PROCEDURE — 3075F SYST BP GE 130 - 139MM HG: CPT | Mod: CPTII,S$GLB,, | Performed by: OBSTETRICS & GYNECOLOGY

## 2019-11-20 PROCEDURE — 3075F PR MOST RECENT SYSTOLIC BLOOD PRESS GE 130-139MM HG: ICD-10-PCS | Mod: CPTII,S$GLB,, | Performed by: OBSTETRICS & GYNECOLOGY

## 2019-11-20 PROCEDURE — 99213 OFFICE O/P EST LOW 20 MIN: CPT | Mod: S$GLB,,, | Performed by: OBSTETRICS & GYNECOLOGY

## 2019-11-20 PROCEDURE — 1159F MED LIST DOCD IN RCRD: CPT | Mod: S$GLB,,, | Performed by: OBSTETRICS & GYNECOLOGY

## 2019-11-20 PROCEDURE — 99999 PR PBB SHADOW E&M-EST. PATIENT-LVL III: ICD-10-PCS | Mod: PBBFAC,,, | Performed by: OBSTETRICS & GYNECOLOGY

## 2019-11-20 PROCEDURE — 3078F PR MOST RECENT DIASTOLIC BLOOD PRESSURE < 80 MM HG: ICD-10-PCS | Mod: CPTII,S$GLB,, | Performed by: OBSTETRICS & GYNECOLOGY

## 2019-11-20 PROCEDURE — 1101F PT FALLS ASSESS-DOCD LE1/YR: CPT | Mod: CPTII,S$GLB,, | Performed by: OBSTETRICS & GYNECOLOGY

## 2019-11-20 PROCEDURE — 99213 PR OFFICE/OUTPT VISIT, EST, LEVL III, 20-29 MIN: ICD-10-PCS | Mod: S$GLB,,, | Performed by: OBSTETRICS & GYNECOLOGY

## 2019-11-20 PROCEDURE — 1101F PR PT FALLS ASSESS DOC 0-1 FALLS W/OUT INJ PAST YR: ICD-10-PCS | Mod: CPTII,S$GLB,, | Performed by: OBSTETRICS & GYNECOLOGY

## 2019-11-20 PROCEDURE — 1159F PR MEDICATION LIST DOCUMENTED IN MEDICAL RECORD: ICD-10-PCS | Mod: S$GLB,,, | Performed by: OBSTETRICS & GYNECOLOGY

## 2019-11-20 PROCEDURE — 99999 PR PBB SHADOW E&M-EST. PATIENT-LVL III: CPT | Mod: PBBFAC,,, | Performed by: OBSTETRICS & GYNECOLOGY

## 2019-11-20 PROCEDURE — 3078F DIAST BP <80 MM HG: CPT | Mod: CPTII,S$GLB,, | Performed by: OBSTETRICS & GYNECOLOGY

## 2019-11-20 PROCEDURE — 1125F AMNT PAIN NOTED PAIN PRSNT: CPT | Mod: S$GLB,,, | Performed by: OBSTETRICS & GYNECOLOGY

## 2019-11-26 ENCOUNTER — TELEPHONE (OUTPATIENT)
Dept: GYNECOLOGIC ONCOLOGY | Facility: CLINIC | Age: 84
End: 2019-11-26

## 2019-11-26 NOTE — TELEPHONE ENCOUNTER
Returned patient phone call. Increasing abdominal pain. Discussed D&C would be for diagnostic purposes which is what she is scheduled for as she wanted to avoid invasive procedures. Imaging reviewed with large irregular shaped uterus and we readdressed hysterectomy again. She would like to proceed with hysterectomy.     Will look into surgery availability and schedule a return office visit to discuss again and review consents.     ----- Message from Carolin Soriano RN sent at 11/25/2019  3:53 PM CST -----  Contact: MARTHA DIAZ      ----- Message -----  From: Sienna Medina  Sent: 11/25/2019   2:39 PM CST  To: [facility] Staff    Type: Patient Call Back    Who called: MARTHA DIAZ    What is the request in detail: Patient is requesting a call back. She states that she is feeling so bad and she would like to discuss her procedure on 12/12. Please advise.     Can the clinic reply by [facility]? No    Best call back number: [phone]     Additional Information: N/A

## 2019-11-26 NOTE — TELEPHONE ENCOUNTER
----- Message from Shirley Romano, Patient Care Assistant sent at 11/26/2019  1:30 PM CST -----  Contact: MARTHA DIAZ [[id number]]  Name of Who is Calling: MARTHA DIAZ [[id number]]    What is the request in detail:Requesting a call back in regards of being seen for later time on 12/3/19, patient states what's available don't work for her. Please contact to further discuss and advise      Can the clinic reply by [facility]: No    What [facility]:   [phone]

## 2019-12-01 PROBLEM — N85.8 UTERINE MASS: Status: ACTIVE | Noted: 2019-12-01

## 2019-12-01 RX ORDER — TRAMADOL HYDROCHLORIDE 50 MG/1
50 TABLET ORAL EVERY 6 HOURS PRN
Qty: 30 TABLET | Refills: 0 | Status: SHIPPED | OUTPATIENT
Start: 2019-12-01 | End: 2020-01-28 | Stop reason: SDUPTHER

## 2019-12-01 NOTE — PROGRESS NOTES
Subjective:      Patient ID: Salome Moore is a 84 y.o. female.    Chief Complaint: Follow-up      HPI  Presents today for follow up visit.   CT A&P 11/15/19  FINDINGS:  The uterus is enlarged by an irregular mass lesion measuring approximately 8.0 by 9.1 by 8.2 cm.  The liver, spleen, adrenal glands and kidneys are unremarkable.  There is no evidence bowel obstruction.  There is no evidence abdominal nor pelvic lymphadenopathy. The osseous structures show degenerative change.      Impression     There is a 9 cm, irregular uterine mass.  While this may represent fibroid a irregularity in size of the cysts are concerning for leiomyosarcoma.      Referral history:  84 yr old para 3 referred from Dr. Jones for a uterine mass concerning for malignancy. Uterine mass found on CT after presenting to GI complaining of nausea and abdominal pain of a few months duration. No VB.     CT per [facility] notes  11 cm uterus with large hypoattenuating, infiltrating mass measuring 7.7 x 10 cm. Cervical canal distended with small scattered right pelvic side wall lymph nodes present. 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     Pelvic US 10/30/2019  Uterus 9.3 x 6.7 x 7.9 cm, multiple uterine masses noted, possibly fibroids, 2.3 - 5.7 cm. ES normal thickness 4mm, portion obscured by overlying mass.  Ovaries not visualized  No free fluid     EMBx attempted and aborted due to cervical stenosis. Watery, yellow d/c noted on exam.     She presents for further evaluation. She reports no known history for fibroids.     LMP early 60s on HRT until last year.     No prior pelvic or abdominal surgeries.     Family history negative for breast, uterine, ovarian and colon cancer.  Review of Systems   Genitourinary: Positive for pelvic pain.       Objective:   Physical Exam:   Constitutional: She is oriented to person, place, and time. She appears  "well-developed and well-nourished.    HENT:   Head: Normocephalic and atraumatic.    Eyes: Pupils are equal, round, and reactive to light. EOM are normal.    Neck: Normal range of motion. Neck supple. No thyromegaly present.    Cardiovascular: Normal rate, regular rhythm and intact distal pulses.     Pulmonary/Chest: Effort normal and breath sounds normal. No respiratory distress. She has no wheezes.        Abdominal: Soft. Bowel sounds are normal. She exhibits no distension, no ascites and no mass. There is no tenderness.             Musculoskeletal: Normal range of motion and moves all extremeties.      Lymphadenopathy:     She has no cervical adenopathy.        Right: No supraclavicular adenopathy present.        Left: No supraclavicular adenopathy present.    Neurological: She is alert and oriented to person, place, and time.    Skin: Skin is warm and dry. No rash noted.    Psychiatric: She has a normal mood and affect.       Assessment:     1. Uterine mass        Plan:   No orders of the defined types were placed in this encounter.    Again reviewed with the patient the constellation of findings. We discussed at our last visit her desires to be as conservative as possible at "her age". And would prefer IR biopsy for least invasive approach for diagnostic purposes. However CT in house only shows abnormal uterine findings, otherwise no adenopathy for biopsy. Reviewed further diagnostic approaches with D&C hysteroscopy as a next least invasive approach to attempting to obtain a diagnosis. She desires to proceed.     Plan for D&C 12/12/19.   "

## 2019-12-02 ENCOUNTER — TELEPHONE (OUTPATIENT)
Dept: PREADMISSION TESTING | Facility: HOSPITAL | Age: 84
End: 2019-12-02

## 2019-12-02 ENCOUNTER — TELEPHONE (OUTPATIENT)
Dept: GYNECOLOGIC ONCOLOGY | Facility: CLINIC | Age: 84
End: 2019-12-02

## 2019-12-02 NOTE — TELEPHONE ENCOUNTER
Spoke with patient and she does not want to come to this [facility]. She is going to Dr. Arredondo office and said they will talk about it then.

## 2019-12-02 NOTE — TELEPHONE ENCOUNTER
----- Message from Rhoda Antonio RN sent at 11/27/2019  2:43 PM CST -----  Surgery date: 12/12/2019  PreOp appt: 12/3/2019    Please call Pt and schedule the following preop appts:    Tony  POC  Lab  EKG    Thank you!  Rhoda

## 2019-12-03 ENCOUNTER — OFFICE VISIT (OUTPATIENT)
Dept: GYNECOLOGIC ONCOLOGY | Facility: CLINIC | Age: 84
End: 2019-12-03
Payer: MEDICARE

## 2019-12-03 ENCOUNTER — TELEPHONE (OUTPATIENT)
Dept: PREADMISSION TESTING | Facility: HOSPITAL | Age: 84
End: 2019-12-03

## 2019-12-03 VITALS
HEART RATE: 77 BPM | DIASTOLIC BLOOD PRESSURE: 66 MMHG | BODY MASS INDEX: 20.47 KG/M2 | SYSTOLIC BLOOD PRESSURE: 137 MMHG | WEIGHT: 119.94 LBS | HEIGHT: 64 IN

## 2019-12-03 DIAGNOSIS — N85.8 UTERINE MASS: Primary | ICD-10-CM

## 2019-12-03 DIAGNOSIS — R19.00 RETROPERITONEAL MASS: Primary | ICD-10-CM

## 2019-12-03 PROCEDURE — 99214 PR OFFICE/OUTPT VISIT, EST, LEVL IV, 30-39 MIN: ICD-10-PCS | Mod: S$GLB,,, | Performed by: OBSTETRICS & GYNECOLOGY

## 2019-12-03 PROCEDURE — 99999 PR PBB SHADOW E&M-EST. PATIENT-LVL III: ICD-10-PCS | Mod: PBBFAC,,, | Performed by: OBSTETRICS & GYNECOLOGY

## 2019-12-03 PROCEDURE — 1159F MED LIST DOCD IN RCRD: CPT | Mod: S$GLB,,, | Performed by: OBSTETRICS & GYNECOLOGY

## 2019-12-03 PROCEDURE — 3075F PR MOST RECENT SYSTOLIC BLOOD PRESS GE 130-139MM HG: ICD-10-PCS | Mod: CPTII,S$GLB,, | Performed by: OBSTETRICS & GYNECOLOGY

## 2019-12-03 PROCEDURE — 99214 OFFICE O/P EST MOD 30 MIN: CPT | Mod: S$GLB,,, | Performed by: OBSTETRICS & GYNECOLOGY

## 2019-12-03 PROCEDURE — 3078F DIAST BP <80 MM HG: CPT | Mod: CPTII,S$GLB,, | Performed by: OBSTETRICS & GYNECOLOGY

## 2019-12-03 PROCEDURE — 1101F PR PT FALLS ASSESS DOC 0-1 FALLS W/OUT INJ PAST YR: ICD-10-PCS | Mod: CPTII,S$GLB,, | Performed by: OBSTETRICS & GYNECOLOGY

## 2019-12-03 PROCEDURE — 1126F AMNT PAIN NOTED NONE PRSNT: CPT | Mod: S$GLB,,, | Performed by: OBSTETRICS & GYNECOLOGY

## 2019-12-03 PROCEDURE — 1159F PR MEDICATION LIST DOCUMENTED IN MEDICAL RECORD: ICD-10-PCS | Mod: S$GLB,,, | Performed by: OBSTETRICS & GYNECOLOGY

## 2019-12-03 PROCEDURE — 1126F PR PAIN SEVERITY QUANTIFIED, NO PAIN PRESENT: ICD-10-PCS | Mod: S$GLB,,, | Performed by: OBSTETRICS & GYNECOLOGY

## 2019-12-03 PROCEDURE — 3078F PR MOST RECENT DIASTOLIC BLOOD PRESSURE < 80 MM HG: ICD-10-PCS | Mod: CPTII,S$GLB,, | Performed by: OBSTETRICS & GYNECOLOGY

## 2019-12-03 PROCEDURE — 3075F SYST BP GE 130 - 139MM HG: CPT | Mod: CPTII,S$GLB,, | Performed by: OBSTETRICS & GYNECOLOGY

## 2019-12-03 PROCEDURE — 99999 PR PBB SHADOW E&M-EST. PATIENT-LVL III: CPT | Mod: PBBFAC,,, | Performed by: OBSTETRICS & GYNECOLOGY

## 2019-12-03 PROCEDURE — 1101F PT FALLS ASSESS-DOCD LE1/YR: CPT | Mod: CPTII,S$GLB,, | Performed by: OBSTETRICS & GYNECOLOGY

## 2019-12-03 NOTE — TELEPHONE ENCOUNTER
Reviewed CT with radiology and noted mass near the right renal potentially accessible for IR biopsy. Patient decline invasive surgery (hyst/debulking) or chemotherapy and would prefer a non-invasive approach to obtaining a tissue diagnosis for prognostic and planning purposes.     Message sent to IR. And communicated with patient.

## 2019-12-03 NOTE — PROGRESS NOTES
"Subjective:      Patient ID: Salome Moore is a 84 y.o. female.    Chief Complaint: Post-op Evaluation (2 week )      HPI  Presents today for follow up visit.   CT A&P 11/15/19       FINDINGS:  The uterus is enlarged by an irregular mass lesion measuring approximately 8.0 by 9.1 by 8.2 cm.  The liver, spleen, adrenal glands and kidneys are unremarkable.  There is no evidence bowel obstruction.  There is no evidence abdominal nor pelvic lymphadenopathy. The osseous structures show degenerative change.       Impression       There is a 9 cm, irregular uterine mass.  While this may represent fibroid a irregularity in size of the cysts are concerning for leiomyosarcoma.      Initially scheduled for D&C for diagnostic purposes. Reports increasing pelvic pain. Imaging reviewed and mass noted near right renal. Here today to discuss further treatment planning. She is interested in obtaining a diagnosis but does not desire surgical or chemotherapy as treatment at "her age".       Referral history:  84 yr old para 3 referred from Dr. Jones for a uterine mass concerning for malignancy. Uterine mass found on CT after presenting to GI complaining of nausea and abdominal pain of a few months duration. No VB.     CT per [facility] notes  11 cm uterus with large hypoattenuating, infiltrating mass measuring 7.7 x 10 cm. Cervical canal distended with small scattered right pelvic side wall lymph nodes present. 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     Pelvic US 10/30/2019  Uterus 9.3 x 6.7 x 7.9 cm, multiple uterine masses noted, possibly fibroids, 2.3 - 5.7 cm. ES normal thickness 4mm, portion obscured by overlying mass.  Ovaries not visualized  No free fluid     EMBx attempted and aborted due to cervical stenosis. Watery, yellow d/c noted on exam.     She reports no known history for fibroids.     LMP early 60s on HRT until last " year.     No prior pelvic or abdominal surgeries.     Family history negative for breast, uterine, ovarian and colon cancer.  Review of Systems   Constitutional: Negative for appetite change, chills, fatigue and fever.   HENT: Negative for mouth sores.    Respiratory: Negative for cough and shortness of breath.    Cardiovascular: Negative for leg swelling.   Gastrointestinal: Negative for abdominal pain, blood in stool, constipation and diarrhea.   Endocrine: Negative for cold intolerance.   Genitourinary: Positive for pelvic pain. Negative for dysuria and vaginal bleeding.   Musculoskeletal: Negative for myalgias.   Skin: Negative for rash.   Allergic/Immunologic: Negative.    Neurological: Negative for weakness and numbness.   Hematological: Negative for adenopathy. Does not bruise/bleed easily.   Psychiatric/Behavioral: Negative for confusion.       Objective:   Physical Exam:   Constitutional: She is oriented to person, place, and time. She appears well-developed and well-nourished.    HENT:   Head: Normocephalic and atraumatic.    Eyes: Pupils are equal, round, and reactive to light. EOM are normal.    Neck: Normal range of motion. Neck supple. No thyromegaly present.    Cardiovascular: Normal rate, regular rhythm and intact distal pulses.     Pulmonary/Chest: Effort normal and breath sounds normal. No respiratory distress. She has no wheezes.        Abdominal: Soft. Bowel sounds are normal. She exhibits no distension, no ascites and no mass. There is no tenderness.             Musculoskeletal: Normal range of motion and moves all extremeties.      Lymphadenopathy:     She has no cervical adenopathy.        Right: No supraclavicular adenopathy present.        Left: No supraclavicular adenopathy present.    Neurological: She is alert and oriented to person, place, and time.    Skin: Skin is warm and dry. No rash noted.    Psychiatric: She has a normal mood and affect.       Assessment:     1. Uterine mass         Plan:   No orders of the defined types were placed in this encounter.    Reviewed CT with radiology and noted mass near the right renal potentially accessible for IR biopsy. Likely represents metastatic disease. Patient declines invasive surgery (hyst/debulking) or chemotherapy and would prefer a non-invasive approach to obtaining a tissue diagnosis for prognostic and planning purposes. Will communicate with IR for consideration of biopsy. The patient and her daughter were allowed to ask questions and all were answered to their satisfaction.

## 2019-12-04 ENCOUNTER — TELEPHONE (OUTPATIENT)
Dept: GYNECOLOGIC ONCOLOGY | Facility: CLINIC | Age: 84
End: 2019-12-04

## 2019-12-04 NOTE — TELEPHONE ENCOUNTER
Spoke with pt. Biopsy location changed to [facility] per patient request. Pt denies any other needs.  ----- Message from Elaine De La Torre sent at 12/4/2019 12:10 PM CST -----  Contact: MARTHA DIAZ [[id number]]  Name of Who is Calling: MARTHA DIAZ [[id number]]     What is the request in detail:MARTHA DIAZ [[id number]] is requesting a call back in regards to recent visit .. And biopsy concerns ....  Please contact to further discuss and advise      Can the clinic reply by [facility]: no     What Number to Call Back if not in [facility]:  [phone] (home)

## 2019-12-09 ENCOUNTER — HOSPITAL ENCOUNTER (OUTPATIENT)
Dept: PREADMISSION TESTING | Facility: HOSPITAL | Age: 84
Discharge: HOME | End: 2019-12-09
Attending: RADIOLOGY
Payer: MEDICARE

## 2019-12-09 VITALS
HEIGHT: 64 IN | TEMPERATURE: 98 F | WEIGHT: 119.06 LBS | RESPIRATION RATE: 18 BRPM | SYSTOLIC BLOOD PRESSURE: 145 MMHG | DIASTOLIC BLOOD PRESSURE: 81 MMHG | BODY MASS INDEX: 20.32 KG/M2 | HEART RATE: 92 BPM

## 2019-12-09 LAB
ALBUMIN SERPL BCP-MCNC: 4 G/DL (ref 3.5–5.2)
ALP SERPL-CCNC: 89 U/L (ref 55–135)
ALT SERPL W/O P-5'-P-CCNC: 24 U/L (ref 10–44)
ANION GAP SERPL CALC-SCNC: 10 MMOL/L (ref 8–16)
AST SERPL-CCNC: 30 U/L (ref 10–40)
BASOPHILS # BLD AUTO: 0.04 K/UL (ref 0–0.2)
BASOPHILS NFR BLD: 0.4 % (ref 0–1.9)
BILIRUB SERPL-MCNC: 0.3 MG/DL (ref 0.1–1)
BUN SERPL-MCNC: 14 MG/DL (ref 8–23)
CALCIUM SERPL-MCNC: 9.6 MG/DL (ref 8.7–10.5)
CHLORIDE SERPL-SCNC: 105 MMOL/L (ref 95–110)
CO2 SERPL-SCNC: 25 MMOL/L (ref 23–29)
CREAT SERPL-MCNC: 0.8 MG/DL (ref 0.5–1.4)
DIFFERENTIAL METHOD: ABNORMAL
EOSINOPHIL # BLD AUTO: 0.1 K/UL (ref 0–0.5)
EOSINOPHIL NFR BLD: 1.3 % (ref 0–8)
ERYTHROCYTE [DISTWIDTH] IN BLOOD BY AUTOMATED COUNT: 12.5 % (ref 11.5–14.5)
EST. GFR  (AFRICAN AMERICAN): >60 ML/MIN/1.73 M^2
EST. GFR  (NON AFRICAN AMERICAN): >60 ML/MIN/1.73 M^2
GLUCOSE SERPL-MCNC: 82 MG/DL (ref 70–110)
HCT VFR BLD AUTO: 35.1 % (ref 37–48.5)
HGB BLD-MCNC: 11.2 G/DL (ref 12–16)
IMM GRANULOCYTES # BLD AUTO: 0.03 K/UL (ref 0–0.04)
IMM GRANULOCYTES NFR BLD AUTO: 0.3 % (ref 0–0.5)
INR PPP: 0.9 (ref 0.8–1.2)
LYMPHOCYTES # BLD AUTO: 3.2 K/UL (ref 1–4.8)
LYMPHOCYTES NFR BLD: 33.2 % (ref 18–48)
MCH RBC QN AUTO: 30.1 PG (ref 27–31)
MCHC RBC AUTO-ENTMCNC: 31.9 G/DL (ref 32–36)
MCV RBC AUTO: 94 FL (ref 82–98)
MONOCYTES # BLD AUTO: 0.8 K/UL (ref 0.3–1)
MONOCYTES NFR BLD: 8.7 % (ref 4–15)
NEUTROPHILS # BLD AUTO: 5.4 K/UL (ref 1.8–7.7)
NEUTROPHILS NFR BLD: 56.1 % (ref 38–73)
NRBC BLD-RTO: 0 /100 WBC
PLATELET # BLD AUTO: 295 K/UL (ref 150–350)
PMV BLD AUTO: 9.9 FL (ref 9.2–12.9)
POTASSIUM SERPL-SCNC: 3.9 MMOL/L (ref 3.5–5.1)
PROT SERPL-MCNC: 7.3 G/DL (ref 6–8.4)
PROTHROMBIN TIME: 9.6 SEC (ref 9–12.5)
RBC # BLD AUTO: 3.72 M/UL (ref 4–5.4)
SODIUM SERPL-SCNC: 140 MMOL/L (ref 136–145)
WBC # BLD AUTO: 9.63 K/UL (ref 3.9–12.7)

## 2019-12-09 PROCEDURE — 85610 PROTHROMBIN TIME: CPT

## 2019-12-09 PROCEDURE — 85025 COMPLETE CBC W/AUTO DIFF WBC: CPT

## 2019-12-09 PROCEDURE — 36415 COLL VENOUS BLD VENIPUNCTURE: CPT

## 2019-12-09 PROCEDURE — 80053 COMPREHEN METABOLIC PANEL: CPT

## 2019-12-09 NOTE — PLAN OF CARE
"Jose from IR in to explain procedure to pt. + understanding from pt. Pt states she is "glad that Jose came to explain this to her."  "

## 2019-12-09 NOTE — DISCHARGE INSTRUCTIONS
"Your procedure  is scheduled for __Wednesday, 12/11/19________.    Call [phone] between 2pm and 5pm on _Tuesday, 12/10/19______to find out your arrival time for the day of surgery.    Report to Same Day Surgery Unit at _________ AM on the 2nd floor of the hospital.  Use the front entrance of the hospital.  The front doors of the hospital open promptly at 5:30am.  If you need wheelchair assistance, call [phone] from your cell phone, or call "0" from the courtesy phone in the lobby.    Important instructions:   Do not eat or drink after 12 midnight, including water.  It is okay to brush your teeth.  Do not have gum, candy or mints.     Take only these medications with a small swallow of water on the morning of your surgery __your morning meds____________      Stop taking Aspirin, Ibuprofen, Motrin and Aleve , Fish oil, and Vitamin E for at least 7 days before your surgery. You may use Tylenol unless otherwise instructed by your doctor.         Prep instructions:   SHOWER   OTHER_____________     Please shower the night before and the morning of your surgery.        Use Hibiclens soap as instructed by your pre op nurse.   Please place clean linens on your bed the night before surgery. Please wear fresh clean clothing after each shower.     Do not wear make- up, including mascara.     You may wear deodorant only.      Do not wear powder, body lotion or perfume/cologne.     Do not wear any jewelry or have any metal on your body.     You will be asked to remove any dentures or partials for the procedure.     Please bring any documents given to you by your doctor.     If you are going home on the same day of surgery, you must arrange for a family member or a friend to drive you home.  Public transportation is prohibited.  You will not be able to drive home if you were given anesthesia or sedation.     Children under 18 years of age require a parent/guardian present the entire time that they are " here.     Wear loose fitting clothes allowing for bandages.     Please leave money and valuables home.       You may bring your cell phone.     Call the doctor if fever or illness should occur before your surgery.    Call [phone] to contact us here if needed.

## 2019-12-11 ENCOUNTER — HOSPITAL ENCOUNTER (OUTPATIENT)
Facility: HOSPITAL | Age: 84
Discharge: HOME | End: 2019-12-11
Attending: INTERNAL MEDICINE | Admitting: RADIOLOGY
Payer: MEDICARE

## 2019-12-11 VITALS
DIASTOLIC BLOOD PRESSURE: 53 MMHG | BODY MASS INDEX: 20.32 KG/M2 | TEMPERATURE: 98 F | SYSTOLIC BLOOD PRESSURE: 109 MMHG | OXYGEN SATURATION: 98 % | WEIGHT: 119.06 LBS | RESPIRATION RATE: 18 BRPM | HEIGHT: 64 IN | HEART RATE: 82 BPM

## 2019-12-11 DIAGNOSIS — R19.00 RETROPERITONEAL MASS: ICD-10-CM

## 2019-12-11 PROCEDURE — 88305 TISSUE EXAM BY PATHOLOGIST: CPT | Mod: 26,,, | Performed by: PATHOLOGY

## 2019-12-11 PROCEDURE — 88305 TISSUE EXAM BY PATHOLOGIST: ICD-10-PCS | Mod: 26,,, | Performed by: PATHOLOGY

## 2019-12-11 PROCEDURE — 88342 IMHCHEM/IMCYTCHM 1ST ANTB: CPT | Mod: 26,,, | Performed by: PATHOLOGY

## 2019-12-11 PROCEDURE — 88341 IMHCHEM/IMCYTCHM EA ADD ANTB: CPT | Performed by: PATHOLOGY

## 2019-12-11 PROCEDURE — 88342 CHG IMMUNOCYTOCHEMISTRY: ICD-10-PCS | Mod: 26,,, | Performed by: PATHOLOGY

## 2019-12-11 PROCEDURE — 88342 IMHCHEM/IMCYTCHM 1ST ANTB: CPT | Performed by: PATHOLOGY

## 2019-12-11 PROCEDURE — 88341 IMHCHEM/IMCYTCHM EA ADD ANTB: CPT | Mod: 26,,, | Performed by: PATHOLOGY

## 2019-12-11 PROCEDURE — 63600175 PHARM REV CODE 636 W HCPCS: Performed by: RADIOLOGY

## 2019-12-11 PROCEDURE — 25000003 PHARM REV CODE 250: Performed by: RADIOLOGY

## 2019-12-11 PROCEDURE — 88305 TISSUE EXAM BY PATHOLOGIST: CPT | Performed by: PATHOLOGY

## 2019-12-11 PROCEDURE — 88341 PR IHC OR ICC EACH ADD'L SINGLE ANTIBODY  STAINPR: ICD-10-PCS | Mod: 26,,, | Performed by: PATHOLOGY

## 2019-12-11 RX ORDER — MIDAZOLAM HYDROCHLORIDE 1 MG/ML
INJECTION INTRAMUSCULAR; INTRAVENOUS CODE/TRAUMA/SEDATION MEDICATION
Status: COMPLETED | OUTPATIENT
Start: 2019-12-11 | End: 2019-12-11

## 2019-12-11 RX ORDER — HYDROCODONE BITARTRATE AND ACETAMINOPHEN 5; 325 MG/1; MG/1
1 TABLET ORAL EVERY 4 HOURS PRN
Status: DISCONTINUED | OUTPATIENT
Start: 2019-12-11 | End: 2019-12-11 | Stop reason: HOSPADM

## 2019-12-11 RX ORDER — FENTANYL CITRATE 50 UG/ML
INJECTION, SOLUTION INTRAMUSCULAR; INTRAVENOUS CODE/TRAUMA/SEDATION MEDICATION
Status: COMPLETED | OUTPATIENT
Start: 2019-12-11 | End: 2019-12-11

## 2019-12-11 RX ADMIN — FENTANYL CITRATE 50 MCG: 50 INJECTION INTRAMUSCULAR; INTRAVENOUS at 10:12

## 2019-12-11 RX ADMIN — HYDROCODONE BITARTRATE AND ACETAMINOPHEN 1 TABLET: 5; 325 TABLET ORAL at 11:12

## 2019-12-11 RX ADMIN — MIDAZOLAM HYDROCHLORIDE 1 MG: 1 INJECTION, SOLUTION INTRAMUSCULAR; INTRAVENOUS at 10:12

## 2019-12-11 NOTE — PLAN OF CARE
Pt verbalized readiness to go home.  Pt given verbal and written DC instructions regarding medications and follow up care.  Pt verbalized understanding and has no questions at this time.

## 2019-12-11 NOTE — DISCHARGE INSTRUCTIONS
BATHING:  ? You may shower tomorrow.  DRESSING:  ? Remove dressing tomorrow.        ACTIVITY LEVEL: If you have received sedation or an anesthetic, you may feel sleepy for several hours. Rest until you are more awake. Gradually resume your normal activities    Do not drive, drink alcohol, or sign legal documents for 24 hours, or if taking narcotic pain medication.      DIET: You may resume your home diet. If nausea is present, increase your diet gradually with fluids and bland foods.    Medications: Pain medication should be taken only if needed and as directed. If antibiotics are prescribed, the medication should be taken until completed. You will be given an updated list of you medications.  ? No driving, alcoholic beverages or signing legal documents for next 24 hours if you have had sedation, or while taking pain medication    CALL THE DOCTOR:   For any obvious bleeding (some dried blood over the incision is normal).     Redness, swelling, foul smell around incision or fever over 101.  Shortness of breath.  Persistent pain or nausea not relieved by medication.  Call  [phone]     to speak with an Interventional Radiologist    If any unusual problems or difficulties occur contact your doctor. If you cannot contact your doctor but feel your signs and symptoms warrant a physicians attention return to the emergency room.    Fall Prevention  Millions of people fall every year and injure themselves. You may have had anesthesia or sedation which may increase your risk of falling. You may have health issues that put you at an increased risk of falling.     Here are ways to reduce your risk of falling.  ·   · Make your home safe by keeping walkways clear of objects you may trip over.  · Use non-slip pads under rugs. Do not use area rugs or small throw rugs.  · Use non-slip mats in bathtubs and showers.  · Install handrails and lights on staircases.  · Do not walk in poorly lit areas.  · Do not stand on chairs or wobbly  ladders.  · Use caution when reaching overhead or looking upward. This position can cause a loss of balance.  · Be sure your shoes fit properly, have non-slip bottoms and are in good condition.   · Wear shoes both inside and out. Avoid going barefoot or wearing slippers.  · Be cautious when going up and down stairs, curbs, and when walking on uneven sidewalks.  · If your balance is poor, consider using a cane or walker.  · If your fall was related to alcohol use, stop or limit alcohol intake.   · If your fall was related to use of sleeping medicines, talk to your doctor about this. You may need to reduce your dosage at bedtime if you awaken during the night to go to the bathroom.    · To reduce the need for nighttime bathroom trips:  ¨ Avoid drinking fluids for several hours before going to bed  ¨ Empty your bladder before going to bed  ¨ Men can keep a urinal at the bedside  · Stay as active as you can. Balance, flexibility, strength, and endurance all come from exercise. They all play a role in preventing falls. Ask your healthcare provider which types of activity are right for you.  · Get your vision checked on a regular basis.  · If you have pets, know where they are before you stand up or walk so you don't trip over them.  · Use night lights.

## 2019-12-11 NOTE — DISCHARGE SUMMARY
Radiology Discharge Summary      Admit date: 12/11/2019  8:54 AM  Discharge date: December 11, 2019    Instructions Given to patient: YesVerbal    Diet: Regular    Activity:NO Restrictions    Medications on discharge (List): Refer to Discharge Medication List    Hospital Course: uneventul    Description of Condition on Discharge: Chief Complaint Resolved    Discharge Disposition: Home    Discharge Diagnosis: right retroperitoneal LN and pelvic mass

## 2019-12-11 NOTE — SEDATION DOCUMENTATION
Spoke to ROMARIO Neves to give beside report. Pt is stable without s/s of complications. Site is without redness or swelling. VSS. Pt to be transported back to [facility] where she will stay until discharge.

## 2019-12-11 NOTE — H&P
[facility]  History & Physical - Short Stay  Interventional Radiology    SUBJECTIVE:     Chief Complaint/Reason for Admission:retroperitoneal biopsy    History of Present Illness:  Salome Moore is a 84 y.o. female with a history of pelvic mass and right retroperitoneal LAD.     OBJECTIVE:     Vital Signs (Most Recent):  Temp: 97.9 °F (36.6 °C) (12/11/19 0924)  Pulse: 89 (12/11/19 1015)  Resp: 13 (12/11/19 1015)  BP: (!) 149/72 (12/11/19 1015)  SpO2: 100 % (12/11/19 1015)    Physical Exam:  Awake, alert and oriented   In no acute distress  Pe,  Eomi  No labored breathing   Moves extremities spontaneously    ASSESSMENT/PLAN:     Pelvic mass and right retro LAD    Biopsy/ CT guided/ right retroperitoneal LN    Sedation/Anesthesia Assessment:  ASA Classification: II = Mild systemic disease  Mallampati Score: II (hard and soft palate, upper portion of tonsils anduvula visible)    Sedation History: no problems    Sedation Plan: moderate

## 2019-12-11 NOTE — PROCEDURES
Interventional Radiology Procedure Note    Procedure: right retroperitoneal LN biopsy    Pre procedure diagnosis: pelvic mass, right retroperitoneal LN    Post procedure diagnosis: same    : MD Joe    Specimens removed: 5 18 gauge samples right retroperitoneal LN    Estimated Blood Loss: 0 ml     Complications: none     Findings: as above

## 2019-12-17 ENCOUNTER — TELEPHONE (OUTPATIENT)
Dept: GYNECOLOGIC ONCOLOGY | Facility: CLINIC | Age: 84
End: 2019-12-17

## 2019-12-17 NOTE — TELEPHONE ENCOUNTER
Called and talked with patient about biopsy results showing adenocarcinoma. Will plan to review further at an office visit when her daughter can be present.       ----- Message from Roxanne Millan RN sent at 12/17/2019  4:30 PM CST -----  Contact: MARTHA DIAZ      ----- Message -----  From: Sienna Medina  Sent: 12/17/2019   4:25 PM CST  To: Arnulfo Hammond Staff    Type:  Patient Returning Call    Who Called: MARTHA DIAZ    Who Left Message for Patient: Dr. Arredondo    Does the patient know what this is regarding?: Results    Can the clinic reply in [facility]: No    Best Call Back Number: [phone]    Additional Information: N/A

## 2019-12-17 NOTE — TELEPHONE ENCOUNTER
Called to review IR biopsy results which shows adenocarcinoma. Given pelvic findings on CT this is clinically most consistent with uterine origin.     No answer. Left voicemail.

## 2019-12-18 ENCOUNTER — TELEPHONE (OUTPATIENT)
Dept: GYNECOLOGIC ONCOLOGY | Facility: CLINIC | Age: 84
End: 2019-12-18

## 2019-12-18 LAB
FINAL PATHOLOGIC DIAGNOSIS: NORMAL
GROSS: NORMAL
Lab: NORMAL
MICROSCOPIC EXAM: NORMAL
SUPPLEMENTAL DIAGNOSIS: NORMAL

## 2019-12-18 NOTE — TELEPHONE ENCOUNTER
Spoke with pt. States her appt was changed. Requesting a refill on main medication. Informed pt I would forward this message to Dr Arredondo for review. Pt verbalized understanding and denies any other needs.   ----- Message from Aarti Baxter sent at 12/18/2019  9:52 AM CST -----  Contact: Pt    Name of Who is Calling:MARTHA DIAZ [[id number]]    What is the request in detail: Patient would a call back Please contact to further discuss and advise    Can the clinic reply by [facility]: no    What Number [facility]: [phone]

## 2019-12-18 NOTE — TELEPHONE ENCOUNTER
----- Message from Carolin Soriano RN sent at 12/18/2019  9:19 AM CST -----  Contact: MARTHA DIAZ [[id number]]      ----- Message -----  From: Lobo Oliveira  Sent: 12/18/2019   9:15 AM CST  To: Arnulfo Hammond Staff     Name of Who is Calling:     What is the request in detail: patient request call back in reference to earlier appointment  Or appointment after [place]  Please contact to further discuss and advise      Can the clinic reply by [facility]: no     What Number [facility] Back [facility]:  [phone]

## 2019-12-30 ENCOUNTER — TELEPHONE (OUTPATIENT)
Dept: GYNECOLOGIC ONCOLOGY | Facility: CLINIC | Age: 84
End: 2019-12-30

## 2019-12-30 DIAGNOSIS — C80.1 ADENOCARCINOMA: Primary | ICD-10-CM

## 2019-12-30 RX ORDER — OXYCODONE AND ACETAMINOPHEN 5; 325 MG/1; MG/1
1 TABLET ORAL EVERY 4 HOURS PRN
Qty: 30 TABLET | Refills: 0 | Status: SHIPPED | OUTPATIENT
Start: 2019-12-30 | End: 2020-02-05 | Stop reason: CLARIF

## 2019-12-30 NOTE — TELEPHONE ENCOUNTER
Called to check in with patient before the new year. No answer. Left voicemail at both home and mobile numbers.

## 2020-01-06 ENCOUNTER — TELEPHONE (OUTPATIENT)
Dept: GYNECOLOGIC ONCOLOGY | Facility: CLINIC | Age: 85
End: 2020-01-06

## 2020-01-07 ENCOUNTER — OFFICE VISIT (OUTPATIENT)
Dept: GYNECOLOGIC ONCOLOGY | Facility: CLINIC | Age: 85
End: 2020-01-07
Payer: MEDICARE

## 2020-01-07 VITALS — WEIGHT: 121.5 LBS | BODY MASS INDEX: 20.74 KG/M2 | HEIGHT: 64 IN

## 2020-01-07 DIAGNOSIS — R19.00 RETROPERITONEAL MASS: Primary | ICD-10-CM

## 2020-01-07 DIAGNOSIS — C54.1 ENDOMETRIAL CANCER: ICD-10-CM

## 2020-01-07 PROCEDURE — 1101F PR PT FALLS ASSESS DOC 0-1 FALLS W/OUT INJ PAST YR: ICD-10-PCS | Mod: CPTII,S$GLB,, | Performed by: OBSTETRICS & GYNECOLOGY

## 2020-01-07 PROCEDURE — 1159F MED LIST DOCD IN RCRD: CPT | Mod: S$GLB,,, | Performed by: OBSTETRICS & GYNECOLOGY

## 2020-01-07 PROCEDURE — 1159F PR MEDICATION LIST DOCUMENTED IN MEDICAL RECORD: ICD-10-PCS | Mod: S$GLB,,, | Performed by: OBSTETRICS & GYNECOLOGY

## 2020-01-07 PROCEDURE — 99999 PR PBB SHADOW E&M-EST. PATIENT-LVL II: ICD-10-PCS | Mod: PBBFAC,,, | Performed by: OBSTETRICS & GYNECOLOGY

## 2020-01-07 PROCEDURE — 1125F PR PAIN SEVERITY QUANTIFIED, PAIN PRESENT: ICD-10-PCS | Mod: S$GLB,,, | Performed by: OBSTETRICS & GYNECOLOGY

## 2020-01-07 PROCEDURE — 99214 PR OFFICE/OUTPT VISIT, EST, LEVL IV, 30-39 MIN: ICD-10-PCS | Mod: S$GLB,,, | Performed by: OBSTETRICS & GYNECOLOGY

## 2020-01-07 PROCEDURE — 1125F AMNT PAIN NOTED PAIN PRSNT: CPT | Mod: S$GLB,,, | Performed by: OBSTETRICS & GYNECOLOGY

## 2020-01-07 PROCEDURE — 99999 PR PBB SHADOW E&M-EST. PATIENT-LVL II: CPT | Mod: PBBFAC,,, | Performed by: OBSTETRICS & GYNECOLOGY

## 2020-01-07 PROCEDURE — 1101F PT FALLS ASSESS-DOCD LE1/YR: CPT | Mod: CPTII,S$GLB,, | Performed by: OBSTETRICS & GYNECOLOGY

## 2020-01-07 PROCEDURE — 99214 OFFICE O/P EST MOD 30 MIN: CPT | Mod: S$GLB,,, | Performed by: OBSTETRICS & GYNECOLOGY

## 2020-01-08 ENCOUNTER — TELEPHONE (OUTPATIENT)
Dept: SURGERY | Facility: CLINIC | Age: 85
End: 2020-01-08

## 2020-01-08 ENCOUNTER — TELEPHONE (OUTPATIENT)
Dept: GYNECOLOGIC ONCOLOGY | Facility: CLINIC | Age: 85
End: 2020-01-08

## 2020-01-08 RX ORDER — OXYCODONE HYDROCHLORIDE 15 MG/1
15 TABLET ORAL EVERY 4 HOURS PRN
Qty: 30 TABLET | Refills: 0 | Status: SHIPPED | OUTPATIENT
Start: 2020-01-08

## 2020-01-08 NOTE — TELEPHONE ENCOUNTER
Spoke with [facility] pharmacy. Per Dr Arredondo it is okay to change the frequency to every 6-8 hours. Verbalized understanding.   ----- Message from Stephany Arredondo MD sent at 1/8/2020 11:28 AM CST -----  Contact: [facility]  She has tried the 5mg tabs with little effect so I am ok with the strength. I'm comfortable changing the frequency to every 6-8h. Can you let the pharmacy know. I can send a new script if they need.      ----- Message -----  From: Carolin Soriano RN  Sent: 1/8/2020  11:24 AM CST  To: Stephany Arredondo MD     Called pharmacy. They were concerned about the strength with pt age. Pharmacy suggested 5mg tablet or changing it to every 6 to 8 hours. If you are comfortable with this the pharmacy will fill it.     Carolin  ----- Message -----  From: Beni Ramires  Sent: 1/8/2020   7:56 AM CST  To: Arnulfo Hammond Staff    Name of Who is Calling: [facility] - [place], [place] - [address]      What is the request in detail: [facility] is requesting dosage information for theoxyCODONE (ROXICODONE) 15 MG Tab. Please contact to further advise.       Can the clinic reply by [facility]: NO      What Number to Call Back if not in [facility]: [phone]

## 2020-01-10 ENCOUNTER — TELEPHONE (OUTPATIENT)
Dept: GYNECOLOGIC ONCOLOGY | Facility: CLINIC | Age: 85
End: 2020-01-10

## 2020-01-10 PROBLEM — C54.1 ENDOMETRIAL CANCER: Status: ACTIVE | Noted: 2020-01-10

## 2020-01-10 NOTE — PROGRESS NOTES
Subjective:      Patient ID: Salome Moore is a 84 y.o. female.    Chief Complaint: uterine mass      HPI  Presents today for follow up visit.     CT in house 11/15/19 reviewed and shows irregular uterine mass and large right likely para-aortic node near the renal.   She has vacillated between palliation versus pursuing treatment so we elected for least invasive means of obtaining a tissue diagnosis.     S/p IR biopsy right para-aortic mass 12/11/9- shows metastatic disease consistent with endometrial origin.   Fibrous tissue (needle biopsies, submitted as right retroperitoneal lymph node):  -Adenocarcinoma with immunohistochemical features (positivity for ER, MN, and CK7) suggestive of an  endometrial primary in this clinical setting  -A battery of immunohistochemical stains is performed. These show tumor cells to exhibit moderate to  strong nuclear staining for both ER and MN. Cytoplasm is strongly positive for CK7 but is negative for CK20  and for CEA. Inhibin and calretinin are both negative. Positive and negative controls function appropriately.  -Note that other primary sites can give rise to carcinomas exhibiting a similar immunohistochemical staining  pattern, particularly breast. However, the clinical history of a large pelvic mass in conjunction with these  immunohistochemical findings would be more compatible with a pelvic primary     Presents today for follow up. Has thought about her diagnosis and wishes to pursue treatment at this time.     Referral history:  84 yr old para 3 referred from Dr. Jones for a uterine mass concerning for malignancy. Uterine mass found on CT after presenting to GI complaining of nausea and abdominal pain of a few months duration. No VB.     CT per [facility] notes  11 cm uterus with large hypoattenuating, infiltrating mass measuring 7.7 x 10 cm. Cervical canal distended with small scattered right pelvic side wall lymph nodes  present. 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     Pelvic US 10/30/2019  Uterus 9.3 x 6.7 x 7.9 cm, multiple uterine masses noted, possibly fibroids, 2.3 - 5.7 cm. ES normal thickness 4mm, portion obscured by overlying mass.  Ovaries not visualized  No free fluid     EMBx attempted and aborted due to cervical stenosis. Watery, yellow d/c noted on exam.     She reports no known history for fibroids.     LMP early 60s on HRT until last year.     No prior pelvic or abdominal surgeries.     Family history negative for breast, uterine, ovarian and colon cancer.    Review of Systems   Constitutional: Negative for appetite change, chills, fatigue and fever.   HENT: Negative for mouth sores.    Respiratory: Negative for cough and shortness of breath.    Cardiovascular: Negative for leg swelling.   Gastrointestinal: Negative for abdominal pain, blood in stool, constipation and diarrhea.   Endocrine: Negative for cold intolerance.   Genitourinary: Positive for pelvic pain and vaginal bleeding. Negative for dysuria.   Musculoskeletal: Negative for myalgias.   Skin: Negative for rash.   Allergic/Immunologic: Negative.    Neurological: Negative for weakness and numbness.   Hematological: Negative for adenopathy. Does not bruise/bleed easily.   Psychiatric/Behavioral: Negative for confusion.       Objective:   Physical Exam:   Constitutional: She is oriented to person, place, and time. She appears well-developed and well-nourished.    HENT:   Head: Normocephalic and atraumatic.    Eyes: Pupils are equal, round, and reactive to light. EOM are normal.    Neck: Normal range of motion. Neck supple. No thyromegaly present.    Cardiovascular: Normal rate, regular rhythm and intact distal pulses.     Pulmonary/Chest: Effort normal and breath sounds normal. No respiratory distress. She has no wheezes.        Abdominal: Soft. Bowel sounds are normal. She exhibits no  "distension, no ascites and no mass. There is no tenderness.     Genitourinary: Vagina normal. Pelvic exam was performed with patient supine. There is no lesion on the right labia. There is no lesion on the left labia. Uterus is enlarged. Uterus is not fixed. Cervix is normal. Right adnexum displays no mass. Left adnexum displays no mass.   Genitourinary Comments: Enlarged irregular shaped uterus with bleeding noted from os. Very mobile and amenable to surgical resection.            Musculoskeletal: Normal range of motion and moves all extremeties.      Lymphadenopathy:     She has no cervical adenopathy.        Right: No supraclavicular adenopathy present.        Left: No supraclavicular adenopathy present.    Neurological: She is alert and oriented to person, place, and time.    Skin: Skin is warm and dry. No rash noted.    Psychiatric: She has a normal mood and affect.       Assessment:     1. Retroperitoneal mass    2. Endometrial cancer        Plan:   No orders of the defined types were placed in this encounter.    We again reviewed her diagnosis of endometrial cancer. Standard management would include surgical debulking if feasible followed by adjuvant therapy. She has vacillated between pursuing treatment versus palliation at "her age". Although she is 84 she has a good performance status and I pursuing treatment is reasonable to consider. After some additional though she desires to pursue options for treatment.     Her pelvic disease is mobile and feels surgically resectable on exam. I have reviewed her case with surgical oncology as well and right high para-aortic mass may be feasible to resect as well though vena cava reconstruction and other surrounding vasculature will make the resection more difficult. We will plan for a consult with surg onc to review this.    Will obtain CT Chest for full metastatic work up.   Addressed her suboptimal pain control. Rx oxycodone provided.        "

## 2020-01-10 NOTE — TELEPHONE ENCOUNTER
Spoke with pt. Informed her that Dr Arredondo would like her to have a CT scan of her chest to fully evaluate disease and move forward with surgery. Pt states she doesn't think her insurance will pay. Informed pt that once it is scheduled the authorization department will work on getting it authorized by her insurance. If there is a problem we will contact her. Pt verbalized understanding and denies any other needs.   ----- Message from Stephany Arredondo MD sent at 1/10/2020 12:19 PM CST -----  Regarding: Needs CT chest  Carolin--She will need a CT of the chest to fully evaluate disease as we move forward with deciding on surgery. Please make her aware and I've copied Melo to assist with scheduling.

## 2020-01-10 NOTE — TELEPHONE ENCOUNTER
----- Message from Carolin Soriano RN sent at 1/10/2020  2:19 PM CST -----  Regarding: RE: Needs CT chest  I spoke with her. Melo please call her to get her scheduled.    Thanks  ----- Message -----  From: Stephany Arredondo MD  Sent: 1/10/2020  12:19 PM CST  To: Melo Ureña, Carolin Soriano RN  Subject: Needs CT chest                                   Carolin--She will need a CT of the chest to fully evaluate disease as we move forward with deciding on surgery. Please make her aware and I've copied Melo to assist with scheduling.

## 2020-01-15 ENCOUNTER — HOSPITAL ENCOUNTER (OUTPATIENT)
Dept: RADIOLOGY | Facility: HOSPITAL | Age: 85
Discharge: HOME | End: 2020-01-15
Attending: OBSTETRICS & GYNECOLOGY
Payer: MEDICARE

## 2020-01-15 DIAGNOSIS — C54.1 ENDOMETRIAL CANCER: ICD-10-CM

## 2020-01-15 PROCEDURE — 71260 CT CHEST WITH CONTRAST: ICD-10-PCS | Mod: 26,,, | Performed by: RADIOLOGY

## 2020-01-15 PROCEDURE — 71260 CT THORAX DX C+: CPT | Mod: TC

## 2020-01-15 PROCEDURE — 25500020 PHARM REV CODE 255: Performed by: OBSTETRICS & GYNECOLOGY

## 2020-01-15 PROCEDURE — 71260 CT THORAX DX C+: CPT | Mod: 26,,, | Performed by: RADIOLOGY

## 2020-01-15 RX ADMIN — IOHEXOL 80 ML: 350 INJECTION, SOLUTION INTRAVENOUS at 02:01

## 2020-01-16 ENCOUNTER — INITIAL CONSULT (OUTPATIENT)
Dept: SURGERY | Facility: CLINIC | Age: 85
End: 2020-01-16
Payer: MEDICARE

## 2020-01-16 VITALS
HEART RATE: 76 BPM | WEIGHT: 121.69 LBS | TEMPERATURE: 97 F | HEIGHT: 64 IN | DIASTOLIC BLOOD PRESSURE: 69 MMHG | SYSTOLIC BLOOD PRESSURE: 142 MMHG | BODY MASS INDEX: 20.78 KG/M2

## 2020-01-16 DIAGNOSIS — C54.1 ENDOMETRIAL CANCER: Primary | ICD-10-CM

## 2020-01-16 PROCEDURE — 1159F PR MEDICATION LIST DOCUMENTED IN MEDICAL RECORD: ICD-10-PCS | Mod: S$GLB,,, | Performed by: SURGERY

## 2020-01-16 PROCEDURE — 1101F PT FALLS ASSESS-DOCD LE1/YR: CPT | Mod: CPTII,S$GLB,, | Performed by: SURGERY

## 2020-01-16 PROCEDURE — 99205 PR OFFICE/OUTPT VISIT, NEW, LEVL V, 60-74 MIN: ICD-10-PCS | Mod: S$GLB,,, | Performed by: SURGERY

## 2020-01-16 PROCEDURE — 1159F MED LIST DOCD IN RCRD: CPT | Mod: S$GLB,,, | Performed by: SURGERY

## 2020-01-16 PROCEDURE — 3077F PR MOST RECENT SYSTOLIC BLOOD PRESSURE >= 140 MM HG: ICD-10-PCS | Mod: CPTII,S$GLB,, | Performed by: SURGERY

## 2020-01-16 PROCEDURE — 3078F DIAST BP <80 MM HG: CPT | Mod: CPTII,S$GLB,, | Performed by: SURGERY

## 2020-01-16 PROCEDURE — 1125F AMNT PAIN NOTED PAIN PRSNT: CPT | Mod: S$GLB,,, | Performed by: SURGERY

## 2020-01-16 PROCEDURE — 3078F PR MOST RECENT DIASTOLIC BLOOD PRESSURE < 80 MM HG: ICD-10-PCS | Mod: CPTII,S$GLB,, | Performed by: SURGERY

## 2020-01-16 PROCEDURE — 99999 PR PBB SHADOW E&M-EST. PATIENT-LVL III: ICD-10-PCS | Mod: PBBFAC,,, | Performed by: SURGERY

## 2020-01-16 PROCEDURE — 99205 OFFICE O/P NEW HI 60 MIN: CPT | Mod: S$GLB,,, | Performed by: SURGERY

## 2020-01-16 PROCEDURE — 99999 PR PBB SHADOW E&M-EST. PATIENT-LVL III: CPT | Mod: PBBFAC,,, | Performed by: SURGERY

## 2020-01-16 PROCEDURE — 1125F PR PAIN SEVERITY QUANTIFIED, PAIN PRESENT: ICD-10-PCS | Mod: S$GLB,,, | Performed by: SURGERY

## 2020-01-16 PROCEDURE — 1101F PR PT FALLS ASSESS DOC 0-1 FALLS W/OUT INJ PAST YR: ICD-10-PCS | Mod: CPTII,S$GLB,, | Performed by: SURGERY

## 2020-01-16 PROCEDURE — 3077F SYST BP >= 140 MM HG: CPT | Mod: CPTII,S$GLB,, | Performed by: SURGERY

## 2020-01-16 RX ORDER — ONDANSETRON HYDROCHLORIDE 8 MG/1
TABLET, FILM COATED ORAL
COMMUNITY
Start: 2020-01-13 | End: 2020-01-28 | Stop reason: SDUPTHER

## 2020-01-16 NOTE — LETTER
January 18, 2020      Stephany Arredondo MD  [address]  [facility] [address]           [facility]  [address]  [facility] [address]  Phone: [phone]          Patient: Salome Moore   MR Number: [id number]   YOB: 1935   Date of Visit: 1/16/2020       Dear Dr. Stephany Arredondo:    Thank you for referring Salome Moore to me for evaluation. Attached you will find relevant portions of my assessment and plan of care.    If you have questions, please do not hesitate to call me. I look forward to following Salome Moore along with you.    Sincerely,    Hiren Mckeon MD    Enclosure  CC:  No Recipients    If you would like to receive this communication electronically, please contact [email or web address] or [phone] to request more information on [email or web address] Link access.    For providers and/or their staff who would like to refer a patient to [facility], please contact us through our one-stop-shop provider referral line, [facility], at [phone].    If you feel you have received this communication in error or would no longer like to receive these types of communications, please e-mail [email or web address]

## 2020-01-17 ENCOUNTER — TELEPHONE (OUTPATIENT)
Dept: UROLOGY | Facility: CLINIC | Age: 85
End: 2020-01-17

## 2020-01-17 NOTE — TELEPHONE ENCOUNTER
----- Message from Chrystal De La Torre sent at 1/17/2020 10:01 AM CST -----  Contact: Self   Type: Patient Call Back    Who called: Self     What is the request in detail:patient states she has a UTI. Please call     Can the clinic reply by [facility]? No     Would the patient rather a call back or a response via [facility]?  Call     Best call back number: [phone]         detailed exam

## 2020-01-17 NOTE — TELEPHONE ENCOUNTER
Pt. Called with UTI symptoms.. I offered to put a urine culture order in for her which she declined.. She would rather see Dr. Lepe.. VIMAL

## 2020-01-21 ENCOUNTER — TELEPHONE (OUTPATIENT)
Dept: SURGERY | Facility: CLINIC | Age: 85
End: 2020-01-21

## 2020-01-21 ENCOUNTER — TELEPHONE (OUTPATIENT)
Dept: GYNECOLOGIC ONCOLOGY | Facility: CLINIC | Age: 85
End: 2020-01-21

## 2020-01-21 DIAGNOSIS — C54.1 ENDOMETRIAL CANCER: Primary | ICD-10-CM

## 2020-01-21 DIAGNOSIS — R19.00 RETROPERITONEAL MASS: Primary | ICD-10-CM

## 2020-01-21 NOTE — TELEPHONE ENCOUNTER
----- Message from Dang Sue sent at 1/21/2020  2:12 PM CST -----  Contact: Pt  Reason: Pt returning call for sophy regard to pt ct scan. Pt stated she does not drive far and looking to get ct scan scheduled at the [facility] location.    Communication: [phone]

## 2020-01-21 NOTE — TELEPHONE ENCOUNTER
Spoke with LeslyTeresa. She stated that she would like to schedule her procedure. Pt was advised that,   will be informed and once the physician gets a date, we will call to get her scheduled.

## 2020-01-21 NOTE — TELEPHONE ENCOUNTER
----- Message from Jessica Romano sent at 1/21/2020  9:33 AM CST -----  Contact: MARTHA DIAZ [[id number]]  Name of Who is Calling: MARTHA DIAZ [[id number]]      What is the request in detail: Pt is calling to speak to staff in regards to scheduling her procedure .... Please call to further assist .       Can the clinic reply by [facility]: N       What Number to Call Back [facility] not in [facility]: [phone]

## 2020-01-22 ENCOUNTER — TELEPHONE (OUTPATIENT)
Dept: GYNECOLOGIC ONCOLOGY | Facility: CLINIC | Age: 85
End: 2020-01-22

## 2020-01-22 NOTE — TELEPHONE ENCOUNTER
"Spoke with pt. Agreed to surgery date on 2/10. Pt very upset about the multiple CT scans she has had to do recently. Discussed surgery instructions with patient. Pt insisted on seeing anesthesia before surgery. 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 Post op appt made. All surgery information mailed to pt. Pt denies any other needs at this time.   "

## 2020-01-23 ENCOUNTER — TELEPHONE (OUTPATIENT)
Dept: PREADMISSION TESTING | Facility: HOSPITAL | Age: 85
End: 2020-01-23

## 2020-01-23 ENCOUNTER — TELEPHONE (OUTPATIENT)
Dept: UROLOGY | Facility: CLINIC | Age: 85
End: 2020-01-23

## 2020-01-23 NOTE — ANESTHESIA PAT ROS NOTE
01/23/2020  Salome Moore is a 84 y.o., female.      Pre-op Assessment         Review of Systems         Anesthesia Assessment: Preoperative EQUATION    Planned Procedure: Procedure(s) (LRB):  HYSTERECTOMY, TOTAL, ABDOMINAL (N/A)  SALPINGO-OOPHORECTOMY, BILATERAL (N/A)  DEBULKING, NEOPLASM (N/A)  Requested Anesthesia Type:General  Surgeon: Stephany Arredondo MD  Service: OB/GYN  Known or anticipated Date of Surgery:2/10/2020    Surgeon notes: reviewed    Previous anesthesia records:Not available    Last PCP note: > 1 year ago   Subspecialty notes: Gyn/ONC    Other important co-morbidities: Per Epic:   HTN      Tests already available:  Available tests,  within 1 month , within [facility] .  12/9/2019 PT, CMP, CBC             Instructions given. (See in Nurse's note)    Optimization:  Anesthesia Preop Clinic Assessment  Indicated.    Medical Opinion Indicated.          Plan:    Testing:  EKG and T&S   Pre-anesthesia  visit       Visit focus: concerns in complex and/or prolonged anesthesia, position other than supine, acute or chronic anxiety concerns, P3/Age 84; pt requested appt with Anesthesia     Consultation:IM Perioperative Hospitalist     Patient  has previously scheduled Medical Appointment: 1/27/2020    Navigation: Tests Scheduled.              Consults scheduled.             Results will be tracked by Preop Clinic.    2/5/2020  The patient was seen by Perioperative Internal Medicine physician Dr. Disla on 2/5/2020, please refer to additional recommendations for the Perioperative Procedure.    No further cardiac work up is indicated prior to proceeding with the surgery; Patient is optimized     2/6/2020  Lab and EKG results noted; reviewed by Dr. Romero.

## 2020-01-23 NOTE — TELEPHONE ENCOUNTER
----- Message from Elaine De La Torre sent at 1/23/2020  2:55 PM CST -----  Contact: MARTHA DIAZ [[id number]]  Name of Who is Calling: MARTHA DIAZ [[id number]]     What is the request in detail:MARTHA DIAZ [[id number]] is requesting a call back in regards to later time appointment later time on same day  ...  Please contact to further discuss and advise      Can the clinic reply by [facility]: no        What Number to Call Back if not in [facility]:  [phone] (home)

## 2020-01-23 NOTE — TELEPHONE ENCOUNTER
----- Message from Rhoda Antonio RN sent at 1/23/2020  8:43 AM CST -----  Surgery date:  2/10/2020  PreOp appt: N/A    Please call Pt and schedule the following preop appts:    POC  Tony Rucker  EKG    Thank you!  Rhoda

## 2020-01-23 NOTE — TELEPHONE ENCOUNTER
Spoke to pt advised as of today  did not have any pm appt on 01/31/2020 but I did try to see if pt wanted to see makenna oreilly. Pt states she would prefer to see  appt kept as the same in the am.-luz

## 2020-01-27 ENCOUNTER — HOSPITAL ENCOUNTER (OUTPATIENT)
Dept: RADIOLOGY | Facility: HOSPITAL | Age: 85
Discharge: HOME | End: 2020-01-27
Attending: SURGERY
Payer: MEDICARE

## 2020-01-27 DIAGNOSIS — R19.00 RETROPERITONEAL MASS: ICD-10-CM

## 2020-01-27 PROCEDURE — 74177 CT ABDOMEN PELVIS WITH CONTRAST: ICD-10-PCS | Mod: 26,,, | Performed by: RADIOLOGY

## 2020-01-27 PROCEDURE — 74177 CT ABD & PELVIS W/CONTRAST: CPT | Mod: 26,,, | Performed by: RADIOLOGY

## 2020-01-27 PROCEDURE — 25500020 PHARM REV CODE 255: Performed by: SURGERY

## 2020-01-27 PROCEDURE — 74177 CT ABD & PELVIS W/CONTRAST: CPT | Mod: TC

## 2020-01-27 RX ADMIN — IOHEXOL 75 ML: 350 INJECTION, SOLUTION INTRAVENOUS at 02:01

## 2020-01-28 ENCOUNTER — TELEPHONE (OUTPATIENT)
Dept: GYNECOLOGIC ONCOLOGY | Facility: CLINIC | Age: 85
End: 2020-01-28

## 2020-01-28 DIAGNOSIS — N85.8 UTERINE MASS: ICD-10-CM

## 2020-01-28 RX ORDER — TRAMADOL HYDROCHLORIDE 50 MG/1
50 TABLET ORAL EVERY 6 HOURS PRN
Qty: 30 TABLET | Refills: 0 | Status: ON HOLD | OUTPATIENT
Start: 2020-01-28 | End: 2020-02-13 | Stop reason: SDUPTHER

## 2020-01-28 RX ORDER — ONDANSETRON HYDROCHLORIDE 8 MG/1
8 TABLET, FILM COATED ORAL EVERY 8 HOURS PRN
Qty: 30 TABLET | Refills: 1 | Status: SHIPPED | OUTPATIENT
Start: 2020-01-28 | End: 2021-01-27

## 2020-01-28 NOTE — TELEPHONE ENCOUNTER
Spoke with pt. States that she has been feeling nausea. Pt requesting a refill on tramadol for pain and zofran. Pt states she would like it called into the [facility] pharmacy on file. Pt verbalized understanding and denies and other needs. Message sent to Dr Arredondo for review.

## 2020-01-29 ENCOUNTER — TELEPHONE (OUTPATIENT)
Dept: GYNECOLOGIC ONCOLOGY | Facility: CLINIC | Age: 85
End: 2020-01-29

## 2020-01-29 NOTE — TELEPHONE ENCOUNTER
Spoke with pt. Informed her that the prescriptions have been called in. Pt verbalized understanding.

## 2020-01-30 ENCOUNTER — OFFICE VISIT (OUTPATIENT)
Dept: UROLOGY | Facility: CLINIC | Age: 85
End: 2020-01-30
Payer: MEDICARE

## 2020-01-30 VITALS
BODY MASS INDEX: 20.58 KG/M2 | DIASTOLIC BLOOD PRESSURE: 80 MMHG | WEIGHT: 120.56 LBS | SYSTOLIC BLOOD PRESSURE: 131 MMHG | HEIGHT: 64 IN

## 2020-01-30 DIAGNOSIS — N30.10 INTERSTITIAL CYSTITIS: Primary | ICD-10-CM

## 2020-01-30 DIAGNOSIS — R39.89 SUSPECTED UTI: ICD-10-CM

## 2020-01-30 LAB
BILIRUB SERPL-MCNC: NORMAL MG/DL
BLOOD URINE, POC: POSITIVE
COLOR, POC UA: NORMAL
GLUCOSE UR QL STRIP: NORMAL
KETONES UR QL STRIP: NORMAL
LEUKOCYTE ESTERASE URINE, POC: POSITIVE
NITRITE, POC UA: POSITIVE
PH, POC UA: 8
POC RESIDUAL URINE VOLUME: 119 ML (ref 0–100)
PROTEIN, POC: NORMAL
SPECIFIC GRAVITY, POC UA: 1010
UROBILINOGEN, POC UA: NORMAL

## 2020-01-30 PROCEDURE — 1125F AMNT PAIN NOTED PAIN PRSNT: CPT | Mod: S$GLB,,, | Performed by: UROLOGY

## 2020-01-30 PROCEDURE — 3079F PR MOST RECENT DIASTOLIC BLOOD PRESSURE 80-89 MM HG: ICD-10-PCS | Mod: CPTII,S$GLB,, | Performed by: UROLOGY

## 2020-01-30 PROCEDURE — 1125F PR PAIN SEVERITY QUANTIFIED, PAIN PRESENT: ICD-10-PCS | Mod: S$GLB,,, | Performed by: UROLOGY

## 2020-01-30 PROCEDURE — 51798 POCT BLADDER SCAN: ICD-10-PCS | Mod: S$GLB,,, | Performed by: UROLOGY

## 2020-01-30 PROCEDURE — 1159F MED LIST DOCD IN RCRD: CPT | Mod: S$GLB,,, | Performed by: UROLOGY

## 2020-01-30 PROCEDURE — 3075F SYST BP GE 130 - 139MM HG: CPT | Mod: CPTII,S$GLB,, | Performed by: UROLOGY

## 2020-01-30 PROCEDURE — 81002 URINALYSIS NONAUTO W/O SCOPE: CPT | Mod: S$GLB,,, | Performed by: UROLOGY

## 2020-01-30 PROCEDURE — 87088 URINE BACTERIA CULTURE: CPT

## 2020-01-30 PROCEDURE — 1101F PT FALLS ASSESS-DOCD LE1/YR: CPT | Mod: CPTII,S$GLB,, | Performed by: UROLOGY

## 2020-01-30 PROCEDURE — 99999 PR PBB SHADOW E&M-EST. PATIENT-LVL III: ICD-10-PCS | Mod: PBBFAC,,, | Performed by: UROLOGY

## 2020-01-30 PROCEDURE — 99214 OFFICE O/P EST MOD 30 MIN: CPT | Mod: 25,S$GLB,, | Performed by: UROLOGY

## 2020-01-30 PROCEDURE — 87086 URINE CULTURE/COLONY COUNT: CPT

## 2020-01-30 PROCEDURE — 87077 CULTURE AEROBIC IDENTIFY: CPT

## 2020-01-30 PROCEDURE — 3075F PR MOST RECENT SYSTOLIC BLOOD PRESS GE 130-139MM HG: ICD-10-PCS | Mod: CPTII,S$GLB,, | Performed by: UROLOGY

## 2020-01-30 PROCEDURE — 1101F PR PT FALLS ASSESS DOC 0-1 FALLS W/OUT INJ PAST YR: ICD-10-PCS | Mod: CPTII,S$GLB,, | Performed by: UROLOGY

## 2020-01-30 PROCEDURE — 81002 POCT URINE DIPSTICK WITHOUT MICROSCOPE: ICD-10-PCS | Mod: S$GLB,,, | Performed by: UROLOGY

## 2020-01-30 PROCEDURE — 87186 SC STD MICRODIL/AGAR DIL: CPT

## 2020-01-30 PROCEDURE — 51798 US URINE CAPACITY MEASURE: CPT | Mod: S$GLB,,, | Performed by: UROLOGY

## 2020-01-30 PROCEDURE — 99214 PR OFFICE/OUTPT VISIT, EST, LEVL IV, 30-39 MIN: ICD-10-PCS | Mod: 25,S$GLB,, | Performed by: UROLOGY

## 2020-01-30 PROCEDURE — 3079F DIAST BP 80-89 MM HG: CPT | Mod: CPTII,S$GLB,, | Performed by: UROLOGY

## 2020-01-30 PROCEDURE — 1159F PR MEDICATION LIST DOCUMENTED IN MEDICAL RECORD: ICD-10-PCS | Mod: S$GLB,,, | Performed by: UROLOGY

## 2020-01-30 PROCEDURE — 99999 PR PBB SHADOW E&M-EST. PATIENT-LVL III: CPT | Mod: PBBFAC,,, | Performed by: UROLOGY

## 2020-01-30 RX ORDER — CEPHALEXIN 500 MG/1
500 CAPSULE ORAL EVERY 8 HOURS
Qty: 30 CAPSULE | Refills: 0 | Status: ON HOLD | OUTPATIENT
Start: 2020-01-30 | End: 2020-02-14 | Stop reason: HOSPADM

## 2020-01-30 NOTE — PROGRESS NOTES
"  Subjective:       Salome Moore is a 84 y.o. female who is an established pt who was self-referred for evaluation of IC.      She reports that she has had IC her "whole life." She takes Macrodantin daily for many, many years. She has been getting her care at [facility] for many years but has decided to switch to  doctors. Her previous urologist was Dr Cox.      She reports that she used to have urethral dilations for "infantile urethra." She has been on and off the abx prophylaxis. She has issues with getting the Macrodantin covered by her insurance. Her symptoms are characterized by burning and pain with urination. She continues to take hormones.    She was given Bactrim SS for prophylaxis at previous visit (5/15). She was interested in urethral dilations though I would need to do cystoscopy prior to this as efficacy is questionable.     She has been doing great since last visit. No UTIs.      1/30/2020  Reports symptoms of UTI - pressure with voiding, malodorous urine. Unfortunately has been recently diagnosed with advanced uterine cancer and is planned for hysterectomy next week at [facility].   PVR (bladder scan) today - [facility]      The following portions of the patient's history were reviewed and updated as appropriate: allergies, current medications, past family history, past medical history, past social history, past surgical history and problem list.    Review of Systems  Constitutional: no fever or chills  ENT: no nasal congestion or sore throat  Respiratory: no cough or shortness of breath  Cardiovascular: no chest pain or palpitations  Gastrointestinal: no nausea or vomiting, tolerating diet  Genitourinary: as per HPI  Hematologic/Lymphatic: no easy bruising or lymphadenopathy  Musculoskeletal: no arthralgias or myalgias  Skin: no rashes or lesions  Neurological: no seizures or tremors  Behavioral/Psych: no auditory or visual hallucinations       Objective:    Vitals:   /80   Ht 5' 4" " (1.626 m)   Wt 54.7 kg (120 lb 9.5 oz)   BMI 20.70 kg/m²     Physical Exam   General: well developed, well nourished in no acute distress  Head: normocephalic, atraumatic  Neck: supple, trachea midline, no obvious enlargement of thyroid  HEENT: EOMI, mucus membranes moist, sclera anicteric, no hearing impairment  Lungs: symmetric expansion, non-labored breathing  Cardiovascular: regular rate and rhythm, normal pulses  Abdomen: soft, non tender, non distended, no palpable masses, no hernias, no CVA tenderness  Musculoskeletal: no peripheral edema, normal ROM in bilateral upper and lower extremities  Lymphatics: no cervical or inguinal lymphadenopathy  Skin: no rashes or lesions  Neuro: alert and oriented x 3, no gross deficits  Psych: normal judgment and insight, normal mood/affect and non-anxious  Genitourinary:   patient declined exam    Lab Review    Urine analysis today in clinic shows - ++LE, +nit, 50 RBCs    Lab Results   Component Value Date    WBC 9.63 12/09/2019    HGB 11.2 (L) 12/09/2019    HCT 35.1 (L) 12/09/2019    MCV 94 12/09/2019     12/09/2019     Lab Results   Component Value Date    CREATININE 0.8 01/15/2020    BUN 14 12/09/2019        Imaging  NA       Assessment/Plan:      1. Interstitial cystitis    - Bactrim SS for prophylaxis, she would like to continue this   - Discussed possibility of cystoscopy to evaluate urethra. She would like to hold on this.    - She is interested in urethral dilations. I prefer to hold on this as unsure its efficacy. Recommend cysto prior to this. Will hold on this for now.     2. Suspected UTI   - UCx today   - Keflex empiric today       Follow up in 6 months (prefers 6 mth f/u)

## 2020-01-31 ENCOUNTER — TELEPHONE (OUTPATIENT)
Dept: SURGERY | Facility: CLINIC | Age: 85
End: 2020-01-31

## 2020-01-31 NOTE — TELEPHONE ENCOUNTER
----- Message from Sarah Liriano sent at 1/31/2020  8:14 AM CST -----  Contact: pt called [phone]  Pt want to speak with nurse so that she let them know what happened when she went to have he CT scan.

## 2020-02-01 LAB — BACTERIA UR CULT: ABNORMAL

## 2020-02-03 ENCOUNTER — TELEPHONE (OUTPATIENT)
Dept: UROLOGY | Facility: CLINIC | Age: 85
End: 2020-02-03

## 2020-02-03 NOTE — TELEPHONE ENCOUNTER
----- Message from Amira Poole sent at 2/3/2020  4:13 PM CST -----  Contact: MARTHA DIAZ [[id number]]  Name of Who is Calling : MARTHA DIAZ [[id number]]    Patient is requesting a call from staff in regards to refill for biaxin   .....Please contact to further discuss and advise.    Can the clinic reply by [facility] : No    What Number to Call Back :  [phone]    Pharmacy: [id number] - [place], [place] - [address]

## 2020-02-03 NOTE — TELEPHONE ENCOUNTER
Patient informed that her urine culture was positive. She will continue abx. Patient verbalized understanding.

## 2020-02-04 ENCOUNTER — TELEPHONE (OUTPATIENT)
Dept: UROLOGY | Facility: CLINIC | Age: 85
End: 2020-02-04

## 2020-02-04 DIAGNOSIS — R39.89 SUSPECTED UTI: Primary | ICD-10-CM

## 2020-02-04 NOTE — TELEPHONE ENCOUNTER
Spoke to pt she states keflex is not helping an would like to know can  call something else in. Her surgery is on Monday an she is worried about the infection. Please advise-luz

## 2020-02-04 NOTE — TELEPHONE ENCOUNTER
----- Message from Yuly Nickerson sent at 2/4/2020  4:39 PM CST -----  Contact: Self   Type: Patient Call Back    Who called: self     What is the request in detail:  Patient says the antibiotic is not working   Can the clinic reply by [facility]?  Call   Would the patient rather a call back or a response via [facility]? Call   Best call back number: [phone]    Additional Information:

## 2020-02-05 ENCOUNTER — TELEPHONE (OUTPATIENT)
Dept: UROLOGY | Facility: CLINIC | Age: 85
End: 2020-02-05

## 2020-02-05 ENCOUNTER — ANESTHESIA EVENT (OUTPATIENT)
Dept: SURGERY | Facility: HOSPITAL | Age: 85
DRG: 740 | End: 2020-02-05
Payer: MEDICARE

## 2020-02-05 ENCOUNTER — INITIAL CONSULT (OUTPATIENT)
Dept: INTERNAL MEDICINE | Facility: CLINIC | Age: 85
End: 2020-02-05
Payer: MEDICARE

## 2020-02-05 ENCOUNTER — HOSPITAL ENCOUNTER (OUTPATIENT)
Dept: PREADMISSION TESTING | Facility: HOSPITAL | Age: 85
Discharge: HOME | End: 2020-02-05
Attending: ANESTHESIOLOGY
Payer: MEDICARE

## 2020-02-05 ENCOUNTER — HOSPITAL ENCOUNTER (OUTPATIENT)
Dept: CARDIOLOGY | Facility: CLINIC | Age: 85
Discharge: HOME | End: 2020-02-05
Payer: MEDICARE

## 2020-02-05 VITALS — DIASTOLIC BLOOD PRESSURE: 72 MMHG | SYSTOLIC BLOOD PRESSURE: 168 MMHG

## 2020-02-05 VITALS
HEIGHT: 64 IN | TEMPERATURE: 95 F | HEART RATE: 84 BPM | WEIGHT: 120 LBS | BODY MASS INDEX: 20.49 KG/M2 | OXYGEN SATURATION: 95 %

## 2020-02-05 DIAGNOSIS — Z01.818 PREOP EXAMINATION: Primary | ICD-10-CM

## 2020-02-05 DIAGNOSIS — H40.003 GLAUCOMA SUSPECT OF BOTH EYES: ICD-10-CM

## 2020-02-05 DIAGNOSIS — I10 ESSENTIAL HYPERTENSION: ICD-10-CM

## 2020-02-05 DIAGNOSIS — Z87.898 HISTORY OF CONFUSION: ICD-10-CM

## 2020-02-05 DIAGNOSIS — N39.0 URINARY TRACT INFECTION WITH HEMATURIA, SITE UNSPECIFIED: ICD-10-CM

## 2020-02-05 DIAGNOSIS — K59.00 CONSTIPATION, UNSPECIFIED CONSTIPATION TYPE: ICD-10-CM

## 2020-02-05 DIAGNOSIS — N30.10 INTERSTITIAL CYSTITIS: ICD-10-CM

## 2020-02-05 DIAGNOSIS — Z01.818 PREOPERATIVE TESTING: ICD-10-CM

## 2020-02-05 DIAGNOSIS — R31.9 URINARY TRACT INFECTION WITH HEMATURIA, SITE UNSPECIFIED: ICD-10-CM

## 2020-02-05 DIAGNOSIS — C54.1 ENDOMETRIAL CANCER: ICD-10-CM

## 2020-02-05 DIAGNOSIS — R06.02 SOB (SHORTNESS OF BREATH): ICD-10-CM

## 2020-02-05 PROCEDURE — 93005 ELECTROCARDIOGRAM TRACING: CPT | Mod: S$GLB,,, | Performed by: ANESTHESIOLOGY

## 2020-02-05 PROCEDURE — 1159F MED LIST DOCD IN RCRD: CPT | Mod: S$GLB,,, | Performed by: HOSPITALIST

## 2020-02-05 PROCEDURE — 99999 PR PBB SHADOW E&M-EST. PATIENT-LVL I: ICD-10-PCS | Mod: PBBFAC,,, | Performed by: HOSPITALIST

## 2020-02-05 PROCEDURE — 93010 ELECTROCARDIOGRAM REPORT: CPT | Mod: S$GLB,,, | Performed by: INTERNAL MEDICINE

## 2020-02-05 PROCEDURE — 1101F PR PT FALLS ASSESS DOC 0-1 FALLS W/OUT INJ PAST YR: ICD-10-PCS | Mod: CPTII,S$GLB,, | Performed by: HOSPITALIST

## 2020-02-05 PROCEDURE — 93010 EKG 12-LEAD: ICD-10-PCS | Mod: S$GLB,,, | Performed by: INTERNAL MEDICINE

## 2020-02-05 PROCEDURE — 1159F PR MEDICATION LIST DOCUMENTED IN MEDICAL RECORD: ICD-10-PCS | Mod: S$GLB,,, | Performed by: HOSPITALIST

## 2020-02-05 PROCEDURE — 99214 OFFICE O/P EST MOD 30 MIN: CPT | Mod: S$GLB,,, | Performed by: HOSPITALIST

## 2020-02-05 PROCEDURE — 99214 PR OFFICE/OUTPT VISIT, EST, LEVL IV, 30-39 MIN: ICD-10-PCS | Mod: S$GLB,,, | Performed by: HOSPITALIST

## 2020-02-05 PROCEDURE — 1101F PT FALLS ASSESS-DOCD LE1/YR: CPT | Mod: CPTII,S$GLB,, | Performed by: HOSPITALIST

## 2020-02-05 PROCEDURE — 93005 EKG 12-LEAD: ICD-10-PCS | Mod: S$GLB,,, | Performed by: ANESTHESIOLOGY

## 2020-02-05 PROCEDURE — 99999 PR PBB SHADOW E&M-EST. PATIENT-LVL I: CPT | Mod: PBBFAC,,, | Performed by: HOSPITALIST

## 2020-02-05 NOTE — ASSESSMENT & PLAN NOTE
Has a history of IC for a long time   On Bactrim suppression for 10 years - and did well no UTI's   Had UTI last week- symptoms- Gross Hematuria , pressure   Was started on Keflex     Has a history of Infantile Urethra   Recurrent UTI  Urinates well   No urinary retention   Having UA today

## 2020-02-05 NOTE — LETTER
February 5, 2020      Stephany Arredondo MD  [address] [address]           [facility] - Pre Op Consult  [address] [address]  Phone: [phone]          Patient: Salome Moore   MR Number: [id number]   YOB: 1935   Date of Visit: 2/5/2020       Dear Dr. Stephany Arredondo:    Thank you for referring Salome Moore to me for evaluation. Attached you will find relevant portions of my assessment and plan of care.    If you have questions, please do not hesitate to call me. I look forward to following Salome Moore along with you.    Sincerely,    Ana Disla MD    Enclosure  CC:  MD Ike Gardner MD    If you would like to receive this communication electronically, please contact [email or web address] or [phone] to request more information on [employer] Link access.    For providers and/or their staff who would like to refer a patient to [facility], please contact us through our one-stop-shop provider referral line, [facility] [place], at [phone].    If you feel you have received this communication in error or would no longer like to receive these types of communications, please e-mail [email or web address]

## 2020-02-05 NOTE — OUTPATIENT SUBJECTIVE & OBJECTIVE
Outpatient Subjective & Objective     Chief complaint-Preoperative evaluation, Perioperative Medical management, complication reduction plan     Active cardiac conditions- none    Revised cardiac risk index predictors- high-risk type of surgery    Functional capacity -Examples of physical activity, very active, walks, dances,  house work and can take 1 flight of stairs----- She can undertake all the above activities without  chest pain,chest tightness, Shortness of breath ,dizziness,lightheadedness making her exercise tolerance more,   than 4 Mets.     Review of Systems   Constitutional: Negative for chills and fever.        No unusual weight changes     HENT:        STOPBANG score  2/ 8      HTN  Age over 50        Eyes:        No new visual changes   Respiratory:        No cough , phlegm    No Hemoptysis   Cardiovascular:        As noted   Gastrointestinal:        No overt GI/vaginal blood losses  Bowel movements-  constipated   Endocrine:        Prednisone use > 20 mg daily for 3 weeks- none   Genitourinary:        No urinary hesitancy    Musculoskeletal:          No unusual, muscle, joint pains   Skin: Negative for rash.   Neurological: Negative for syncope.        No unilateral weakness   Hematological:        Current use of Anticoagulants  None    Psychiatric/Behavioral:        No Depression,Anxiety       No vascular stenting           No anesthesia, bleeding, cardiac problems , PONV with previous surgeries/procedures.  Medications and Allergies reviewed in epic.   FH- No anesthesia,bleeding / venous thrombosis ,problem in family     Physical Exam      Physical Exam  Constitutional- General appearance-Conscious,Coherent  Eyes- No conjunctival icterus,pupils  round , reactive to light  and  bilateral intra ocular lenses  ENT-Oral cavity- moist  , Hearing grossly normal   Neck- No thyromegaly ,Trachea -central, No jugular venous distension,   No Carotid Bruit   Cardiovascular -Heart Sounds- Normal ,  no murmur   and  occasional irregularity suggestive of ectopy   , No gallop rhythm   Respiratory - Normal Respiratory Effort, Normal breath sounds, crepitations bases,  Crepitationsmid zones ,  no wheeze  and  no forced expiratory wheeze    Peripheral pitting pedal edema-- none , no calf pain   Gastrointestinal -Soft abdomen, No palpable masses, lower abdominal, epigastric  Tender,Liver,Spleen not palpable. No-- free fluid and shifting dullness  Musculoskeletal- No finger Clubbing. Strength grossly normal   Lymphatic-No Palpable cervical, axillary,Inguinal lymphadenopathy   Psychiatric - normal effect,Orientation  Rt Dorsalis pedis pulses-palpable    Lt Dorsalis pedis pulses- palpable   Rt Posterior tibial pulses -palpable   Left posterior tibial pulses -palpable   Miscellaneous -  no renal bruit,  bowel sounds positive  and osteoarthritic nodes   Investigations  Lab and Imaging have been reviewed in epic.    Review of Medicine tests    April 2015     No significant proximal ICA stenosis (<50%).    Review of clinical lab tests:  Lab Results   Component Value Date    CREATININE 0.8 01/15/2020    HGB 11.2 (L) 12/09/2019     12/09/2019     Suggested follow up            Review of old records- Was done and information gathered regards to events leading to surgery and health conditions of significance in the perioperative period.    Outpatient Subjective & Objective

## 2020-02-05 NOTE — DISCHARGE INSTRUCTIONS
Your surgery has been scheduled for:  Monday, February 10, 2020    You should report to:    ____[facility], located on the [facility] side of the            Second floor of the [facility] ([phone])    Please Note   - Tell your doctor if you take Aspirin, products containing Aspirin, herbal medications or blood thinners, such as Coumadin, Ticlid, or Plavix.  (Consult your provider regarding holding or stopping before surgery).  - Arrange for someone to drive you home following surgery.  You will not be allowed to leave the surgical facility alone or drive yourself home following sedation and anesthesia.    Before Surgery  - Stop taking all vitamins/ herbal medications 7 days prior to surgery  - No Motrin/Advil (Ibuprofen) 7 days before surgery  - No Aleve (Naproxen) 7 days before surgery  - Stop taking Aspirin, products containing Aspirin 7 days before surgery  - Stop taking blood thinners_______days before surgery  - No Goody's/BC  Powder 7 days before surgery  - Refrain from drinking alcoholic beverages for 24 hours before and after surgery  - Stop or limit smoking 14 days before surgery  - You may take Tylenol for pain    Night before Surgery   Stop ALL solid food, gum, candy (including vitamins) 8 hours before arrival time.  (Please note: If your surgeon gives you different eating and drinking instructions, please follow surgeon's directions.)   Stop all CLOUDY liquids: coffee with creamer, formula, tube feeds, cloudy juices, non-human milk and breast milk with additives, 6 hours prior to arrival time.   Stop plain breast milk 4 hours prior to arrival time.   The patient should be ENCOURAGED to drink carbohydrate-rich clear liquids (sports drinks, clear juices) until 2 hours prior to arrival time.   CLEAR liquids include only water, black coffee NO creamer, clear oral rehydration drinks, clear sports drinks or clear fruit juices (no orange juice, no pulpy juices,  no apple cider). Advise patients if they can read newsprint through the liquid, it qualifies as clear liquid.    IF IN DOUBT, drink water instead.   - Take a shower or bath (shower is recommended).  Bathe with Hibiclens soap or an antibacterial soap from the neck down.  If not supplied by your surgeon, hibiclens soap will need to be purchased over the counter in pharmacy.  Rinse soap off thoroughly.  - Shampoo your hair with your regular shampoo    The Day of Surgery    NOTHING TO  DRINK 2 hours before arrival time. If you are told to take medication on the morning of surgery, it may be taken with a sip of water.   - Take another bath or shower with hibiclens or any antibacterial soap, to reduce the chance of infection.  - Take heart and blood pressure medications with a small sip of water, as advised by the perioperative team.  - Do not take fluid pills  - You may brush your teeth and rinse your mouth, but do not swallow any additional water.   - Do not apply perfumes, powder, body lotions or deodorant on the day of surgery.  - Nail polish should be removed.  - Do not wear makeup or moisturizer.  - Wear comfortable clothes, such as a button front shirt and loose fitting pants.  - Leave all jewelry, including body piercings, and valuables at home.    - Bring any devices you will neeed after surgery such as crutches or canes.  - If you have sleep apnea, please bring your CPAP machine.    In the event that your physical condition changes including the onset of a cold or respiratory illness, or if you have to delay or cancel your surgery, please notify your surgeon.    Anesthesia: General Anesthesia     You are watched continuously during your procedure by your anesthesia provider.     Youre due to have surgery. During surgery, youll be given medicine called anesthesia or anesthetic. This will keep you comfortable and pain-free. Your anesthesia provider will use general anesthesia.  What is general  anesthesia?  General anesthesia puts you into a state like deep sleep. It goes into the bloodstream (IV anesthetics), into the lungs (gas anesthetics), or both. You feel nothing during the procedure. You will not remember it. During the procedure, the anesthesia provider monitors you continuously. He or she checks your heart rate and rhythm, blood pressure, breathing, and blood oxygen.  · IV anesthetics. IV anesthetics are given through an IV line in your arm. Theyre often given first. This is so you are asleep before a gas anesthetic is started. Some kinds of IV anesthetics relieve pain. Others relax you. Your doctor will decide which kind is best in your case.  · Gas anesthetics. Gas anesthetics are breathed into the lungs. They are often used to keep you asleep. They can be given through a facemask or a tube placed in your larynx or trachea (breathing tube).  ? If you have a facemask, your anesthesia provider will most likely place it over your nose and mouth while youre still awake. Youll breathe oxygen through the mask as your IV anesthetic is started. Gas anesthetic may be added through the mask.  ? If you have a tube in the larynx or trachea, it will be inserted into your throat after youre asleep.  Anesthesia tools and medicines  You will likely have:  · IV anesthetics. These are put into an IV line into your bloodstream.  · Gas anesthetics. You breathe these anesthetics into your lungs, where they pass into your bloodstream.  · Pulse oximeter. This is a small clip that is attached to the end of your finger. This measures your blood oxygen level.  · Electrocardiography leads (electrodes). These are small sticky pads that are placed on your chest. They record your heart rate and rhythm.  · Blood pressure cuff. This reads your blood pressure.  Risks and possible complications  General anesthesia has some risks. These include:  · Breathing problems  · Nausea and vomiting  · Sore throat or hoarseness  (usually temporary)  · Allergic reaction to the anesthetic  · Irregular heartbeat (rare)  · Cardiac arrest (rare)   Anesthesia safety  · Follow all instructions you are given for how long not to eat or drink before your procedure.  · Be sure your doctor knows what medicines and drugs you take. This includes over-the-counter medicines, herbs, supplements, alcohol or other drugs. You will be asked when those were last taken.  · Have an adult family member or friend drive you home after the procedure.  · For the first 24 hours after your surgery:  ? Do not drive or use heavy equipment.  ? Do not make important decisions or sign legal documents. If important decisions or signing legal documents is necessary during the first 24 hours after surgery, have a trusted family member or spouse act on your behalf.  ? Avoid alcohol.  ? Have a responsible adult stay with you. He or she can watch for problems and help keep you safe.  Date Last Reviewed: 12/1/2016  © 0290-5360 [employer]. [address] [place] [address], [place] [address]. All rights reserved. This information is not intended as a substitute for professional medical care. Always follow your healthcare professional's instructions

## 2020-02-05 NOTE — ASSESSMENT & PLAN NOTE
Can happen even at rest   No orthopnea , PND, pedal edema    No tobacco use, Asthma, COPD history     SOB does not seem to be from a Cardio pulmonary cause     She wonders , if it is anxiety related , pelvic mass related pressure

## 2020-02-05 NOTE — ASSESSMENT & PLAN NOTE
Losartan    Home BP readings -120/80's  Recent BP readings in the [id number]  Hypertension-  Blood pressure is acceptable . I suggest continuation of Losartan ( Nightly )- during the entire perioperative period. I suggest  blood pressure, electrolyte and renal function monitoring as long as they are acceptable.I suggest addressing pain control as uncontrolled pain can increased blood pressure

## 2020-02-05 NOTE — PROGRESS NOTES
"Rub[facility] - Pre Op Consult  Progress Note    Patient Name: Salome Moore  MRN: [id number]  Date of Evaluation- 02/05/2020  PCP- Ike Almeida MD    Future cases for Wes Moore [[id number]]     Case ID Status Date Time Surjit Procedure Provider Location    [id number] [facility] 2/10/2020  7:00  HYSTERECTOMY, TOTAL, ABDOMINAL Stephany Arredondo MD [[id number]] [facility] OR 2ND FLR          HPI:  History of present illness- I had the pleasure of meeting this pleasant 84 y.o. lady in the pre op clinic prior to her elective Gynecological surgery. The patient is new to me .Goes by " Wes "  was accompanied by daughter Dorothy.    I have obtained the history by speaking to the patient and by reviewing the electronic health records.    Events leading up to surgery / History of presenting illness -    Endometrial cancer    Started with lower  abdominal cramping since End of oct 2019 , on and off ( Mild- Moderate)   , some times aggravated by walking , carrying weights,, laying down  Saw GI MD , who performed as CT abdomen . Further evaluation  lead to the diagnosis of endometrial cancer   Was On HRT since age 29 Y for heavy menstrual cycles . Used to have light vaginal bleeding every 28 days until age 71  No vaginal bleeding   Feels pressure in the pelvic pressure     Pain  decreases with Tramadol       Relevant health conditions of significance for the perioperative period/ History of presenting illness -    Subjectively describes health as excellent      Retired Psychiatric nurse     Lives alone   Drives, self cares   Independent   [place]   Help available post op    Health conditions of significance for the perioperative period     HTN    History of delirium     Not known to have heart disease , Diabetes Mellitus, Lung disease         Subjective/ Objective:       Chief complaint-Preoperative evaluation, Perioperative Medical management, complication reduction plan     Active cardiac conditions- none    Revised " cardiac risk index predictors- high-risk type of surgery    Functional capacity -Examples of physical activity, very active, walks, dances,  house work and can take 1 flight of stairs----- She can undertake all the above activities without  chest pain,chest tightness, Shortness of breath ,dizziness,lightheadedness making her exercise tolerance more,   than 4 Mets.     Review of Systems   Constitutional: Negative for chills and fever.        No unusual weight changes     HENT:        STOPBANG score  2/ 8      HTN  Age over 50        Eyes:        No new visual changes   Respiratory:        No cough , phlegm    No Hemoptysis   Cardiovascular:        As noted   Gastrointestinal:        No overt GI/vaginal blood losses  Bowel movements-  constipated   Endocrine:        Prednisone use > 20 mg daily for 3 weeks- none   Genitourinary:        No urinary hesitancy    Musculoskeletal:          No unusual, muscle, joint pains   Skin: Negative for rash.   Neurological: Negative for syncope.        No unilateral weakness   Hematological:        Current use of Anticoagulants  None    Psychiatric/Behavioral:        No Depression,Anxiety       No vascular stenting           No anesthesia, bleeding, cardiac problems , PONV with previous surgeries/procedures.  Medications and Allergies reviewed in epic.   FH- No anesthesia,bleeding / venous thrombosis ,problem in family     Physical Exam      Physical Exam  Constitutional- General appearance-Conscious,Coherent  Eyes- No conjunctival icterus,pupils  round , reactive to light  and  bilateral intra ocular lenses  ENT-Oral cavity- moist  , Hearing grossly normal   Neck- No thyromegaly ,Trachea -central, No jugular venous distension,   No Carotid Bruit   Cardiovascular -Heart Sounds- Normal ,  no murmur  and  occasional irregularity suggestive of ectopy   , No gallop rhythm   Respiratory - Normal Respiratory Effort, Normal breath sounds, crepitations bases,  Crepitationsmid zones ,  no  wheeze  and  no forced expiratory wheeze    Peripheral pitting pedal edema-- none , no calf pain   Gastrointestinal -Soft abdomen, No palpable masses, lower abdominal, epigastric  Tender,Liver,Spleen not palpable. No-- free fluid and shifting dullness  Musculoskeletal- No finger Clubbing. Strength grossly normal   Lymphatic-No Palpable cervical, axillary,Inguinal lymphadenopathy   Psychiatric - normal effect,Orientation  Rt Dorsalis pedis pulses-palpable    Lt Dorsalis pedis pulses- palpable   Rt Posterior tibial pulses -palpable   Left posterior tibial pulses -palpable   Miscellaneous -  no renal bruit,  bowel sounds positive  and osteoarthritic nodes   Investigations  Lab and Imaging have been reviewed in [facility].    Review of Medicine tests    April 2015     No significant proximal ICA stenosis (<50%).    Review of clinical lab tests:  Lab Results   Component Value Date    CREATININE 0.8 01/15/2020    HGB 11.2 (L) 12/09/2019     12/09/2019     Suggested follow up            Review of old records- Was done and information gathered regards to events leading to surgery and health conditions of significance in the perioperative period.        Preoperative cardiac risk assessment-  The patient does not have any active cardiac conditions . Revised cardiac risk index predictors-1 ---.Functional capacity is more than 4 Mets. She will be undergoing a Gynecological procedure that carries a high risk     Risk of a major Cardiac event ( Defined as death, myocardial infarction, or cardiac arrest at 30 days after noncardiac surgery), based on RCRI score     -6.0%       No further cardiac work up is indicated prior to proceeding with the surgery          American Society of Anesthesiologists Physical status classification ( ASA ) class: 3     Postoperative pulmonary complication risk assessment:      ARISCAT ( Canet) risk index- risk class -  Low, if duration of surgery is under 2 hours, intermediate, if duration of surgery  is 2- 3 hours , higher , if over 3 hours      Susi Respiratory failure index- percentage risk of respiratory failure: 0.5 %     Assessment/Plan:     Essential hypertension  Losartan    Home BP readings -120/80's  Recent BP readings in the [id number]  Hypertension-  Blood pressure is acceptable . I suggest continuation of Losartan ( Nightly )- during the entire perioperative period. I suggest  blood pressure, electrolyte and renal function monitoring as long as they are acceptable.I suggest addressing pain control as uncontrolled pain can increased blood pressure     Interstitial cystitis  Has a history of IC for a long time   On Bactrim suppression for 10 years - and did well no UTI's   Had UTI last week- symptoms- Gross Hematuria , pressure   Was started on Keflex     Has a history of Infantile Urethra   Recurrent UTI  Urinates well   No urinary retention   Having UA today         Endometrial cancer  As per chart   uterine mass concerning for malignancy. Uterine mass found on CT after presenting to GI complaining of nausea and abdominal pain of a few months duration.    .   CT  shows irregular uterine mass and large right likely para-aortic node near the renal.   S/p IR biopsy right para-aortic mass 12/11/9- shows metastatic disease consistent with endometrial origin.       UTI (urinary tract infection)  On treatment for UTI  Tolerating keflex well   No diarrhea     Glaucoma suspect of both eyes  No un usual vision problem     History of confusion  In the setting of Opioids use   Increased risk of post operative delirium     Suggest the following to reduce post op delirium     -Orientation   --Hydration   - Addressing constipation   - Adequate sleep  - Reduced opioid use   - Addressing potential urinary retention/ UTI  - Having friend/ family who can help with orientation  - Having her in a room with a window , clock, calender that  Will help with with time orientation    She likes watching News,  doing puzzles, sports          Constipation  Opioid related   Constipation- I suggest giving laxative regimen as opioid use,reduced ambulation  can increase the constipation     SOB (shortness of breath)  Can happen even at rest   No orthopnea , PND, pedal edema    No tobacco use, Asthma, COPD history     SOB does not seem to be from a Cardio pulmonary cause     She wonders , if it is anxiety related , pelvic mass related pressure        Preventive perioperative care    Thromboembolic prophylaxis:  Her risk factors for thrombosis include surgical procedure and age.I suggest  thromboembolic prophylaxis ( mechanical/pharmacological, weighing the risk benefits of pharmacological agent use considering [facility] procedural bleeding )  during the perioperative period.I suggested being active in the post operative period.      Postoperative pulmonary complication prophylaxis-Risk factors for post operative pulmonary complications include age over 65 years and ASA class >2- I suggest incentive spirometry use, early ambulation, end tidal carbon dioxide monitoring and pain control so as to avoid diaphragmatic splinting  , oral care , head end of bed elevation      Renal complication prophylaxis-Risk factors for renal complications include hypertension . I suggest keeping her well hydrated  in the perioperative period.    Surgical site Infection Prophylaxis-I  suggest appropriate antibiotic for Prophylaxis against Surgical site infections     Delirium prophylaxis-Risk factors - Advanced Age - I suggest avoidance / minimizing the use of  Benzodiazepines ( unless the patient has been taking it on a regular basis ),Anticholinergic medication,Antihistamines ( like  Benadryl).I suggest minimizing the use of opioid medication and use of IV tylenol,if it is appropriate. I suggest using the lowest possible dose of opioids for the shortest duration possible in the perioperative period. I suggest to Keep shades/blinds open during the day,  lights off and shades closed at night to encourage normal sleep/wake cycle.I encourage the presence of the family member with the patient at all times, if at all possible as mental status changes can be picked up early by the family members and they help with reorientation. I encouraged the presence of family to help with orientation in the perioperative period. Benadryl avoidance suggested      In view of gynecological procedure the patient  is at risk of postoperative urinary retention.  I suggest avoidance / minimizing the of  Benzodiazepines,Anticholinergic medication,antihistamines ( Benadryl) , if possible in the perioperative period. I suggest using the minimum possible use of opioids for the minimum period of time in the perioperative period. Benadryl avoidance suggested      This visit was focused on Preoperative evaluation, Perioperative Medical management, complication reduction plans. I suggest that the patient follows up with primary care or relevant sub specialists for ongoing health care.    I appreciate the opportunity to be involved in this patients care. Please feel free to contact me if there were any questions about this consultation.    Patient is optimized       Patient  was instructed to call and update me about any changes to health,  medication, office visits ,testing out side of the [facility] operative [facility] center , hospitalizations between now and surgery     Ana Disla MD  Perioperative Medicine  [facility]   Pager [phone]  --  2/5- 19 00     BP (!) 168/72 Comment: lt, 191/76 rt and 188/78 rt manual  Sat 95 %   No suggestions of fever   Anxiety , could have contributed to high BP    EKG from 2/5/2020 personally reviewed reportedly showed   Sinus rhythm with sinus arrhythmia with Premature ventricular complexes  Nonspecific T wave abnormality  Abnormal ECG  No previous ECGs available  -  2/6- 2000    Called to follow up , to address any concerns with the up coming  surgery or any questions on Medication instructions   Unable to speak   Left a message to call, if needed

## 2020-02-05 NOTE — HPI
"History of present illness- I had the pleasure of meeting this pleasant 84 y.o. lady in the pre op clinic prior to her elective Gynecological surgery. The patient is new to me .Goes by " Wes "  was accompanied by daughter Dorothy.    I have obtained the history by speaking to the patient and by reviewing the electronic health records.    Events leading up to surgery / History of presenting illness -    Endometrial cancer    Started with lower  abdominal cramping since End of oct 2019 , on and off ( Mild- Moderate)   , some times aggravated by walking , carrying weights,, laying down  Saw GI MD , who performed as CT abdomen . Further evaluation  lead to the diagnosis of endometrial cancer   Was On HRT since age 29 Y for heavy menstrual cycles . 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

## 2020-02-05 NOTE — TELEPHONE ENCOUNTER
Culture appropriate abx were given for recent UTI. Recommend to recheck urine if still having symptoms. UA and UCx ordered.

## 2020-02-05 NOTE — ASSESSMENT & PLAN NOTE
In the setting of Opioids use   Increased risk of post operative delirium     Suggest the following to reduce post op delirium     -Orientation   --Hydration   - Addressing constipation   - Adequate sleep  - Reduced opioid use   - Addressing potential urinary retention/ UTI  - Having friend/ family who can help with orientation  - Having her in a room with a window , clock, calender that  Will help with with time orientation    She likes watching News, doing puzzles, sports

## 2020-02-05 NOTE — ASSESSMENT & PLAN NOTE
As per chart   uterine mass concerning for malignancy. Uterine mass found on CT after presenting to GI complaining of nausea and abdominal pain of a few months duration.    .   CT  shows irregular uterine mass and large right likely para-aortic node near the renal.   S/p IR biopsy right para-aortic mass 12/11/9- shows metastatic disease consistent with endometrial origin.

## 2020-02-05 NOTE — ASSESSMENT & PLAN NOTE
Opioid related   Constipation- I suggest giving laxative regimen as opioid use,reduced ambulation  can increase the constipation

## 2020-02-05 NOTE — ANESTHESIA PREPROCEDURE EVALUATION
"   [facility] [address]  Anesthesia Pre-Operative Evaluation         Patient Name: Salome Moore  YOB: 1935  MRN: [id number]    SUBJECTIVE:     Pre-operative evaluation for Procedure(s) (LRB):  HYSTERECTOMY, TOTAL, ABDOMINAL (N/A)  SALPINGO-OOPHORECTOMY, BILATERAL (N/A)  DEBULKING, NEOPLASM (N/A)     02/05/2020    Salome Moore is a 84 y.o. female w/ a significant PMHx of HTN and interstitial cystitis who presents for pre-op evaluation regarding newly discovered uterine mass and concern for metatasis.     Discussed pre-operative anesthesia course. Answered all questions and addressed concerns. Patient expressed particular concern regarding emergence/post-operative delirium. Shares that she has had older friends who underwent anesthesia and nerver fully recovered mentally. Reports that she considered forgoing surgery because of her concerns about not being in her "right mind." Discussed ability to choose medications to limit delirium in elderly, as well as minimize risks of emergence delirium. Patient expressed increased anxiety regarding recent diagnosis. Complains of significant acid reflux and nausea associated w/ abdominal mass. Patient is otherwise healthy. Leads active lifestyle. Fully independent w/ ADLs. Able to walk flight of stairs, 1 block w/o stopping. No known pulmonary or CV history.    Patient now presents for the above procedure(s).      LDA: None documented.     Prev airway: None documented.    Drips: None documented.      Patient Active Problem List   Diagnosis    Interstitial cystitis    Glaucoma suspect of both eyes    Pseudophakia    Refractive error    Essential hypertension    Open angle with borderline findings and low glaucoma risk in both eyes    Uterine mass    Retroperitoneal mass    Endometrial cancer       Review of patient's allergies indicates:   Allergen Reactions    Pcn [penicillins] Other (See Comments)     Patient can't " remember the reaction. Reaction occurred over 60 years       Current Inpatient Medications:      Current Outpatient Medications on File Prior to Encounter   Medication Sig Dispense Refill    cephALEXin (KEFLEX) 500 MG capsule Take 1 capsule (500 mg total) by mouth every 8 (eight) hours. for 10 days 30 capsule 0    dicyclomine (BENTYL) 10 MG capsule TAKE 1 CAPSULE BY MOUTH 3 TIMES A DAY AS NEEDED FOR PAIN  5    erythromycin with ethanol (THERAMYCIN) 2 % external solution 1 application every evening. Apply to affected area  3    HYDROcodone-acetaminophen (NORCO) 5-325 mg per tablet Take 1 tablet by mouth every 6 (six) hours as needed for Pain. 10 tablet 0    latanoprost 0.005 % ophthalmic solution Place 1 drop into the left eye nightly. 5 mL 6    losartan (COZAAR) 100 MG tablet once daily.   5    ondansetron (ZOFRAN) 8 MG tablet Take 1 tablet (8 mg total) by mouth every 8 (eight) hours as needed for Nausea. 30 tablet 1    oxyCODONE (ROXICODONE) 15 MG Tab Take 1 tablet (15 mg total) by mouth every 4 (four) hours as needed for Pain. 30 tablet 0    oxyCODONE-acetaminophen (PERCOCET) 5-325 mg per tablet Take 1 tablet by mouth every 4 (four) hours as needed for Pain. 30 tablet 0    sulfamethoxazole-trimethoprim 400-80mg (BACTRIM,SEPTRA) 400-80 mg per tablet Take 1 tablet by mouth once daily. 90 tablet 3    TAZORAC 0.1 % Gel gel once daily.   11    traMADol (ULTRAM) 50 mg tablet Take 1 tablet (50 mg total) by mouth every 6 (six) hours as needed for Pain. 30 tablet 0    zolpidem (AMBIEN) 10 mg Tab every evening.   0     No current facility-administered medications on file prior to encounter.        Past Surgical History:   Procedure Laterality Date    BREAST SURGERY      augmentation    CATARACT EXTRACTION W/  INTRAOCULAR LENS IMPLANT Bilateral     VAGINAL DELIVERY         Social History     Socioeconomic History    Marital status:      Spouse name: Not on file    Number of children: Not on file     Years of education: Not on file    Highest education level: Not on file   Occupational History    Not on file   Social Needs    Financial resource strain: Not on file    Food insecurity:     Worry: Not on file     Inability: Not on file    Transportation needs:     Medical: Not on file     Non-medical: Not on file   Tobacco Use    Smoking status: Never Smoker    Smokeless tobacco: Never Used   Substance and Sexual Activity    Alcohol use: No     Frequency: Never    Drug use: No    Sexual activity: Not Currently     Partners: Male   Lifestyle    Physical activity:     Days per week: Not on file     Minutes per session: Not on file    Stress: Not on file   Relationships    Social connections:     Talks on phone: Not on file     Gets together: Not on file     Attends Synagogue service: Not on file     Active member of club or organization: Not on file     Attends meetings of clubs or organizations: Not on file     Relationship status: Not on file   Other Topics Concern    Not on file   Social History Narrative    Not on file       OBJECTIVE:     Vital Signs Range (Last 24H):  Temp:  [35 °C (95 °F)]   Pulse:  [84]   SpO2:  [95 %]       Significant Labs:  Lab Results   Component Value Date    WBC 9.63 12/09/2019    HGB 11.2 (L) 12/09/2019    HCT 35.1 (L) 12/09/2019     12/09/2019    ALT 24 12/09/2019    AST 30 12/09/2019     12/09/2019    K 3.9 12/09/2019     12/09/2019    CREATININE 0.8 01/15/2020    BUN 14 12/09/2019    CO2 25 12/09/2019    INR 0.9 12/09/2019       Diagnostic Studies: No relevant studies.    EKG:   No results found for this or any previous visit.    2D ECHO:  TTE:  No results found for this or any previous visit.    JEAN PAUL:  No results found for this or any previous visit.    ASSESSMENT/PLAN:                                                                                                              Anesthesia Evaluation    I have reviewed the Patient Summary Reports.     I have reviewed the Nursing Notes.   I have reviewed the Medications.     Review of Systems  Anesthesia Hx:  No problems with previous Anesthesia  History of prior surgery of interest to airway management or planning: Denies Family Hx of Anesthesia complications.   Denies Personal Hx of Anesthesia complications.   Hematology/Oncology:  Hematology Normal      Current/Recent Cancer.   EENT/Dental:EENT/Dental Normal   Cardiovascular:  Cardiovascular Normal Exercise tolerance: good Hypertension     Pulmonary:  Pulmonary Normal    Renal/:   Interstital cystitis   Hepatic/GI:   GERD    Musculoskeletal:  Musculoskeletal Normal    OB/GYN/PEDS:  Uterine mass, endometrial cancer   Neurological:  Neurology Normal    Endocrine:  Endocrine Normal    Psych:  Psychiatric Normal             Physical Exam  General:  Well nourished    Airway/Jaw/Neck:  Airway Findings: Mouth Opening: Normal Tongue: Normal  General Airway Assessment: Adult  Mallampati: II  Improves to II with phonation.  TM Distance: Normal, at least 6 cm        Eyes/Ears/Nose:  Eyes/Ears/Nose Findings:    Dental:  Dental Findings: In tact   Chest/Lungs:  Chest/Lungs Findings: Normal Respiratory Rate     Heart/Vascular:  Heart Findings: Rate: Normal        Mental Status:  Mental Status Findings:  Cooperative, Alert and Oriented       Wt Readings from Last 3 Encounters:   02/10/20 54.4 kg (120 lb)   02/05/20 54.4 kg (120 lb)   01/30/20 54.7 kg (120 lb 9.5 oz)     Temp Readings from Last 3 Encounters:   02/10/20 36.7 °C (98.1 °F)   02/05/20 (!) 35 °C (95 °F)   01/16/20 36.1 °C (97 °F) (Oral)     BP Readings from Last 3 Encounters:   02/10/20 (!) 178/74   02/05/20 (!) 168/72   01/30/20 131/80     Pulse Readings from Last 3 Encounters:   02/10/20 64   02/05/20 84   01/16/20 76           Anesthesia Plan  Type of Anesthesia, risks & benefits discussed:  Anesthesia Type:   general  Patient's Preference:   Intra-op Monitoring Plan:  standard ASA monitors  Intra-op  Monitoring Plan Comments:   Post Op Pain Control Plan:  per primary service following discharge from PACU, multimodal analgesia and IV/PO Opioids PRN  Post Op Pain Control Plan Comments:   Induction:   IV  Beta Blocker:         Informed Consent:  Patient understands risks and agrees with Anesthesia plan.  Questions answered. Anesthesia consent signed with patient.  ASA Score:  2     Day of Surgery Review of History & Physical:            Ready For Surgery From Anesthesia Perspective.

## 2020-02-05 NOTE — TELEPHONE ENCOUNTER
Spoke to pt advised per  according to previous urine culture it showed keflex was the correct antibiotic to be on but since pt is still having symptoms  will order a new urine culture.-luz

## 2020-02-06 ENCOUNTER — TELEPHONE (OUTPATIENT)
Dept: GYNECOLOGIC ONCOLOGY | Facility: CLINIC | Age: 85
End: 2020-02-06

## 2020-02-06 NOTE — PROGRESS NOTES
CHIEF COMPLAINT: stage IV endometrial cancer      HPI:  Ms. Harvey is a healthy and active 85yo F with new diagnosis of stage IV endometrial cancer. She is a former mental health nurse. She comes to see me to discuss excision of a paracaval lymph node during her debulking operation. She has no lower extremity swelling and is quite healthy. Her primary concern is her mental faculty and possible post operative delirium at her age.    PAST MEDICAL HISTORY:  Past Medical History:   Diagnosis Date    Basal cell carcinoma     Cataract     Cystitis     Glaucoma     Hypertension        PAST SURGICAL HISTORY:  Past Surgical History:   Procedure Laterality Date    BREAST SURGERY      augmentation    CATARACT EXTRACTION W/  INTRAOCULAR LENS IMPLANT Bilateral     EYE SURGERY      VAGINAL DELIVERY         FAMILY HISTORY:  Family History   Problem Relation Age of Onset    Hypertension Father     Cataracts Father     Heart attack Father 64    Cataracts Mother     Amblyopia Neg Hx     Blindness Neg Hx     Glaucoma Neg Hx     Macular degeneration Neg Hx     Retinal detachment Neg Hx     Strabismus Neg Hx        ROS:  Review of Systems   Constitutional: Negative for chills, fatigue, fever and unexpected weight change.   Respiratory: Negative for cough and stridor.    Cardiovascular: Negative for chest pain and palpitations.   Gastrointestinal: Negative for abdominal pain, constipation, diarrhea, nausea and vomiting.   Genitourinary: Negative for dysuria and frequency.       MEDICATIONS:     Medication List           Accurate as of January 16, 2020 11:59 PM. If you have any questions, ask your nurse or doctor.               CONTINUE taking these medications    dicyclomine 10 MG capsule  Commonly known as:  BENTYL     erythromycin with ethanol 2 % external solution  Commonly known as:  THERAMYCIN     losartan 100 MG tablet  Commonly known as:  COZAAR     ondansetron 8 MG tablet  Commonly known as:  ZOFRAN      oxyCODONE 15 MG Tab  Commonly known as:  ROXICODONE  Take 1 tablet (15 mg total) by mouth every 4 (four) hours as needed for Pain.     sulfamethoxazole-trimethoprim 400-80mg 400-80 mg per tablet  Commonly known as:  BACTRIM,SEPTRA  Take 1 tablet by mouth once daily.     traMADol 50 mg tablet  Commonly known as:  ULTRAM  Take 1 tablet (50 mg total) by mouth every 6 (six) hours as needed for Pain.     zolpidem 10 mg Tab  Commonly known as:  AMBIEN            SOCIAL HISTORY:  Social History     Socioeconomic History    Marital status:      Spouse name: Not on file    Number of children: Not on file    Years of education: Not on file    Highest education level: Not on file   Occupational History    Not on file   Social Needs    Financial resource strain: Not on file    Food insecurity:     Worry: Not on file     Inability: Not on file    Transportation needs:     Medical: Not on file     Non-medical: Not on file   Tobacco Use    Smoking status: Never Smoker    Smokeless tobacco: Never Used   Substance and Sexual Activity    Alcohol use: No     Frequency: Never    Drug use: No    Sexual activity: Not Currently     Partners: Male   Lifestyle    Physical activity:     Days per week: Not on file     Minutes per session: Not on file    Stress: Not on file   Relationships    Social connections:     Talks on phone: Not on file     Gets together: Not on file     Attends Christian service: Not on file     Active member of club or organization: Not on file     Attends meetings of clubs or organizations: Not on file     Relationship status: Not on file   Other Topics Concern    Not on file   Social History Narrative    Not on file     RACE / BMI:  Race: White  BMI: Body mass index is 20.89 kg/m².    ALLERGIES:  Review of patient's allergies indicates:   Allergen Reactions    Pcn [penicillins] Other (See Comments)     Patient can't remember the reaction. Reaction occurred over 60 years        RADIOGRAPHIC FINDINGS:  CT show retroperitoneal lymphadenopathy, the most concerning of which is a paracaval node adjacent to the takeoff of the right renal vein.    LABS:  Tumor Markers:  No results found for: CEA, AMYLASE, ASPIRATE, DPB327, , DW3075, AFP, AFPTM    Nutritional:  Lab Results   Component Value Date    ALBUMIN 4.0 12/09/2019       LFTs:  Lab Results   Component Value Date    ALBUMIN 4.0 12/09/2019    BILITOT 0.3 12/09/2019    AST 30 12/09/2019    PROT 7.3 12/09/2019       CMP:  Lab Results   Component Value Date    GLU 82 12/09/2019    CALCIUM 9.6 12/09/2019    ALBUMIN 4.0 12/09/2019    PROT 7.3 12/09/2019     12/09/2019    K 3.9 12/09/2019    CO2 25 12/09/2019     12/09/2019    BUN 14 12/09/2019    CREATININE 0.8 01/15/2020    ALKPHOS 89 12/09/2019    ALT 24 12/09/2019    AST 30 12/09/2019    BILITOT 0.3 12/09/2019       PHYSICAL EXAM:  Physical Exam   Constitutional: She is oriented to person, place, and time. She appears well-developed and well-nourished. No distress.   HENT:   Head: Normocephalic and atraumatic.   Eyes: No scleral icterus.   Neck: No JVD present.   Cardiovascular: Normal heart sounds.   Pulmonary/Chest: Breath sounds normal.   Abdominal: Soft. Bowel sounds are normal. She exhibits no distension. There is no tenderness.   Musculoskeletal: She exhibits no edema.   Neurological: She is alert and oriented to person, place, and time.   Skin: Skin is warm and dry.       ASSESSMENT/PLAN:  83yo F, healthy, stage IV endometrial cancer with retroperitoneal nodes  I have reviewed her imaging in detail. With obtain a CT venogram to evaluate invasion into the vena cava. This node is resectable, whether it be with or without a caval reconstruction and Ms. Moore is aware of this possibility. She has additional retroperitoneal nodes which will need to be removed. I have asked Dr. Arredondo to facilitate right ureteral stent placement for intraoperative localization.    She is a  retired mental health nurse and her biggest concern is the risk of post operative delirium and its consequences. While she is certainly at risk of this due to her age, she is very sharp and I think the risk is relatively low. But we did go over measures to help prevent this, including good pain control post operatively, minimizing narcotics, and sleep/wake cycle.      Hiren Mckeon MD  Upper GI / Hepatobiliary Surgical Oncology  [facility] [place], [place]  Office: [phone]  Fax: [phone]

## 2020-02-06 NOTE — TELEPHONE ENCOUNTER
Spoke with pt. Confirmed procedure for 2/10/2020. Instructed pt to arrive at 0530 and go to the 2nd floor [facility] reminded pt nothing to eat or drink after midnight. Pt denies any further questions.   Pt states she has a kidney infection and has been on Keflex since Thursday last week. Pt had blood in urine. Pt wanted to make sure Dr Arredondo was aware. Informed pt I would send this message to Dr Arredondo. Verbalized understanding.

## 2020-02-09 DIAGNOSIS — C54.1 ENDOMETRIAL CANCER: Primary | ICD-10-CM

## 2020-02-09 RX ORDER — HEPARIN SODIUM 5000 [USP'U]/ML
5000 INJECTION, SOLUTION INTRAVENOUS; SUBCUTANEOUS ONCE
Status: CANCELLED | OUTPATIENT
Start: 2020-02-09

## 2020-02-09 RX ORDER — SODIUM CHLORIDE 9 MG/ML
INJECTION, SOLUTION INTRAVENOUS CONTINUOUS
Status: CANCELLED | OUTPATIENT
Start: 2020-02-09

## 2020-02-09 RX ORDER — LIDOCAINE HYDROCHLORIDE 10 MG/ML
1 INJECTION, SOLUTION EPIDURAL; INFILTRATION; INTRACAUDAL; PERINEURAL ONCE
Status: CANCELLED | OUTPATIENT
Start: 2020-02-09 | End: 2020-02-09

## 2020-02-09 RX ORDER — MUPIROCIN 20 MG/G
OINTMENT TOPICAL
Status: CANCELLED | OUTPATIENT
Start: 2020-02-09

## 2020-02-10 ENCOUNTER — HOSPITAL ENCOUNTER (INPATIENT)
Facility: HOSPITAL | Age: 85
LOS: 7 days | Discharge: HOME HEALTH | DRG: 740 | End: 2020-02-17
Attending: OBSTETRICS & GYNECOLOGY | Admitting: OBSTETRICS & GYNECOLOGY
Payer: MEDICARE

## 2020-02-10 ENCOUNTER — ANESTHESIA (OUTPATIENT)
Dept: SURGERY | Facility: HOSPITAL | Age: 85
DRG: 740 | End: 2020-02-10
Payer: MEDICARE

## 2020-02-10 DIAGNOSIS — I10 ESSENTIAL HYPERTENSION: ICD-10-CM

## 2020-02-10 DIAGNOSIS — Z90.79 S/P TAH-BSO: ICD-10-CM

## 2020-02-10 DIAGNOSIS — C54.1 ENDOMETRIAL CANCER: ICD-10-CM

## 2020-02-10 DIAGNOSIS — Z90.710 S/P TAH-BSO: ICD-10-CM

## 2020-02-10 DIAGNOSIS — N39.0 URINARY TRACT INFECTION WITH HEMATURIA, SITE UNSPECIFIED: ICD-10-CM

## 2020-02-10 DIAGNOSIS — R07.9 CHEST PAIN: ICD-10-CM

## 2020-02-10 DIAGNOSIS — N85.8 UTERINE MASS: ICD-10-CM

## 2020-02-10 DIAGNOSIS — R31.9 URINARY TRACT INFECTION WITH HEMATURIA, SITE UNSPECIFIED: ICD-10-CM

## 2020-02-10 DIAGNOSIS — Z90.722 S/P TAH-BSO: ICD-10-CM

## 2020-02-10 DIAGNOSIS — Z01.818 PREOPERATIVE TESTING: Primary | ICD-10-CM

## 2020-02-10 LAB
ALBUMIN SERPL BCP-MCNC: 2.7 G/DL (ref 3.5–5.2)
ALP SERPL-CCNC: 55 U/L (ref 55–135)
ALT SERPL W/O P-5'-P-CCNC: 8 U/L (ref 10–44)
ANION GAP SERPL CALC-SCNC: 10 MMOL/L (ref 8–16)
AST SERPL-CCNC: 24 U/L (ref 10–40)
BILIRUB SERPL-MCNC: 2 MG/DL (ref 0.1–1)
BLD PROD TYP BPU: NORMAL
BLD PROD TYP BPU: NORMAL
BLOOD UNIT EXPIRATION DATE: NORMAL
BLOOD UNIT EXPIRATION DATE: NORMAL
BLOOD UNIT TYPE CODE: 600
BLOOD UNIT TYPE CODE: 600
BLOOD UNIT TYPE: NORMAL
BLOOD UNIT TYPE: NORMAL
BUN SERPL-MCNC: 10 MG/DL (ref 8–23)
CALCIUM SERPL-MCNC: 7.7 MG/DL (ref 8.7–10.5)
CHLORIDE SERPL-SCNC: 108 MMOL/L (ref 95–110)
CO2 SERPL-SCNC: 21 MMOL/L (ref 23–29)
CODING SYSTEM: NORMAL
CODING SYSTEM: NORMAL
CREAT SERPL-MCNC: 0.7 MG/DL (ref 0.5–1.4)
DISPENSE STATUS: NORMAL
DISPENSE STATUS: NORMAL
EST. GFR  (AFRICAN AMERICAN): >60 ML/MIN/1.73 M^2
EST. GFR  (NON AFRICAN AMERICAN): >60 ML/MIN/1.73 M^2
GLUCOSE SERPL-MCNC: 119 MG/DL (ref 70–110)
GLUCOSE SERPL-MCNC: 164 MG/DL (ref 70–110)
GLUCOSE SERPL-MCNC: 179 MG/DL (ref 70–110)
GLUCOSE SERPL-MCNC: 181 MG/DL (ref 70–110)
GLUCOSE SERPL-MCNC: 182 MG/DL (ref 70–110)
HCO3 UR-SCNC: 21.7 MMOL/L (ref 24–28)
HCO3 UR-SCNC: 22.6 MMOL/L (ref 24–28)
HCO3 UR-SCNC: 23.1 MMOL/L (ref 24–28)
HCO3 UR-SCNC: 23.8 MMOL/L (ref 24–28)
HCT VFR BLD AUTO: 26.8 % (ref 37–48.5)
HCT VFR BLD CALC: 19 %PCV (ref 36–54)
HCT VFR BLD CALC: 22 %PCV (ref 36–54)
HCT VFR BLD CALC: 24 %PCV (ref 36–54)
HCT VFR BLD CALC: 29 %PCV (ref 36–54)
HGB BLD-MCNC: 8.4 G/DL (ref 12–16)
PCO2 BLDA: 40.6 MMHG (ref 35–45)
PCO2 BLDA: 41.6 MMHG (ref 35–45)
PCO2 BLDA: 41.9 MMHG (ref 35–45)
PCO2 BLDA: 42.8 MMHG (ref 35–45)
PH SMN: 7.32 [PH] (ref 7.35–7.45)
PH SMN: 7.33 [PH] (ref 7.35–7.45)
PH SMN: 7.35 [PH] (ref 7.35–7.45)
PH SMN: 7.38 [PH] (ref 7.35–7.45)
PO2 BLDA: 109 MMHG (ref 80–100)
PO2 BLDA: 121 MMHG (ref 80–100)
PO2 BLDA: 208 MMHG (ref 80–100)
PO2 BLDA: 298 MMHG (ref 80–100)
POC BE: -1 MMOL/L
POC BE: -2 MMOL/L
POC BE: -3 MMOL/L
POC BE: -4 MMOL/L
POC IONIZED CALCIUM: 1.03 MMOL/L (ref 1.06–1.42)
POC IONIZED CALCIUM: 1.05 MMOL/L (ref 1.06–1.42)
POC IONIZED CALCIUM: 1.05 MMOL/L (ref 1.06–1.42)
POC IONIZED CALCIUM: 1.08 MMOL/L (ref 1.06–1.42)
POC SATURATED O2: 100 % (ref 95–100)
POC SATURATED O2: 100 % (ref 95–100)
POC SATURATED O2: 98 % (ref 95–100)
POC SATURATED O2: 99 % (ref 95–100)
POC TCO2: 23 MMOL/L (ref 23–27)
POC TCO2: 24 MMOL/L (ref 23–27)
POC TCO2: 24 MMOL/L (ref 23–27)
POC TCO2: 25 MMOL/L (ref 23–27)
POTASSIUM BLD-SCNC: 2.9 MMOL/L (ref 3.5–5.1)
POTASSIUM BLD-SCNC: 3.6 MMOL/L (ref 3.5–5.1)
POTASSIUM BLD-SCNC: 4.1 MMOL/L (ref 3.5–5.1)
POTASSIUM BLD-SCNC: 4.3 MMOL/L (ref 3.5–5.1)
POTASSIUM SERPL-SCNC: 3.9 MMOL/L (ref 3.5–5.1)
PROT SERPL-MCNC: 4.3 G/DL (ref 6–8.4)
SAMPLE: ABNORMAL
SODIUM BLD-SCNC: 136 MMOL/L (ref 136–145)
SODIUM BLD-SCNC: 137 MMOL/L (ref 136–145)
SODIUM BLD-SCNC: 139 MMOL/L (ref 136–145)
SODIUM BLD-SCNC: 139 MMOL/L (ref 136–145)
SODIUM SERPL-SCNC: 139 MMOL/L (ref 136–145)
TRANS ERYTHROCYTES VOL PATIENT: NORMAL ML
TRANS ERYTHROCYTES VOL PATIENT: NORMAL ML

## 2020-02-10 PROCEDURE — D9220A PRA ANESTHESIA: ICD-10-PCS | Mod: ANES,,, | Performed by: ANESTHESIOLOGY

## 2020-02-10 PROCEDURE — 20600001 HC STEP DOWN PRIVATE ROOM

## 2020-02-10 PROCEDURE — P9021 RED BLOOD CELLS UNIT: HCPCS

## 2020-02-10 PROCEDURE — 58210 PR RADICAL ABD HYSTEREC+PELV NODES: ICD-10-PCS | Mod: 52,,, | Performed by: OBSTETRICS & GYNECOLOGY

## 2020-02-10 PROCEDURE — 94799 UNLISTED PULMONARY SVC/PX: CPT

## 2020-02-10 PROCEDURE — 25000003 PHARM REV CODE 250: Performed by: NURSE ANESTHETIST, CERTIFIED REGISTERED

## 2020-02-10 PROCEDURE — 36620 INSERTION CATHETER ARTERY: CPT | Mod: 59,,, | Performed by: ANESTHESIOLOGY

## 2020-02-10 PROCEDURE — 36000709 HC OR TIME LEV III EA ADD 15 MIN: Performed by: OBSTETRICS & GYNECOLOGY

## 2020-02-10 PROCEDURE — C1751 CATH, INF, PER/CENT/MIDLINE: HCPCS | Performed by: ANESTHESIOLOGY

## 2020-02-10 PROCEDURE — P9016 RBC LEUKOCYTES REDUCED: HCPCS

## 2020-02-10 PROCEDURE — 44160 PR REMVL COLON & TERM ILEUM W/ILEOCOLOSTOMY: ICD-10-PCS | Mod: 51,,, | Performed by: SURGERY

## 2020-02-10 PROCEDURE — 63600175 PHARM REV CODE 636 W HCPCS: Performed by: STUDENT IN AN ORGANIZED HEALTH CARE EDUCATION/TRAINING PROGRAM

## 2020-02-10 PROCEDURE — 88307 TISSUE EXAM BY PATHOLOGIST: CPT | Mod: 26,,, | Performed by: PATHOLOGY

## 2020-02-10 PROCEDURE — 38780 PR REMOVE ABD LYMPH NODES EXTENSIVE: ICD-10-PCS | Mod: 51,,, | Performed by: SURGERY

## 2020-02-10 PROCEDURE — 88381 MICRODISSECTION MANUAL: CPT | Performed by: PATHOLOGY

## 2020-02-10 PROCEDURE — 25000003 PHARM REV CODE 250: Performed by: STUDENT IN AN ORGANIZED HEALTH CARE EDUCATION/TRAINING PROGRAM

## 2020-02-10 PROCEDURE — 44160 REMOVAL OF COLON: CPT | Mod: 51,,, | Performed by: SURGERY

## 2020-02-10 PROCEDURE — 36620 PR INSERT CATH,ART,PERCUT,SHORTTERM: ICD-10-PCS | Mod: 59,,, | Performed by: ANESTHESIOLOGY

## 2020-02-10 PROCEDURE — 25000003 PHARM REV CODE 250: Performed by: OBSTETRICS & GYNECOLOGY

## 2020-02-10 PROCEDURE — 34502 PR RECONSTRUCT, VENA CAVA: ICD-10-PCS | Mod: ,,, | Performed by: SURGERY

## 2020-02-10 PROCEDURE — 27200677 HC TRANSDUCER MONITOR KIT SINGLE: Performed by: ANESTHESIOLOGY

## 2020-02-10 PROCEDURE — 71000015 HC POSTOP RECOV 1ST HR: Performed by: OBSTETRICS & GYNECOLOGY

## 2020-02-10 PROCEDURE — 88341 IMHCHEM/IMCYTCHM EA ADD ANTB: CPT | Mod: 59 | Performed by: PATHOLOGY

## 2020-02-10 PROCEDURE — 88309 TISSUE EXAM BY PATHOLOGIST: CPT | Mod: 26,,, | Performed by: PATHOLOGY

## 2020-02-10 PROCEDURE — 88305 TISSUE EXAM BY PATHOLOGIST: CPT | Performed by: PATHOLOGY

## 2020-02-10 PROCEDURE — 88305 TISSUE EXAM BY PATHOLOGIST: ICD-10-PCS | Mod: 26,,, | Performed by: PATHOLOGY

## 2020-02-10 PROCEDURE — 27100025 HC TUBING, SET FLUID WARMER: Performed by: ANESTHESIOLOGY

## 2020-02-10 PROCEDURE — D9220A PRA ANESTHESIA: Mod: CRNA,,, | Performed by: NURSE ANESTHETIST, CERTIFIED REGISTERED

## 2020-02-10 PROCEDURE — S0030 INJECTION, METRONIDAZOLE: HCPCS | Performed by: NURSE ANESTHETIST, CERTIFIED REGISTERED

## 2020-02-10 PROCEDURE — 80053 COMPREHEN METABOLIC PANEL: CPT

## 2020-02-10 PROCEDURE — 58210 EXTENSIVE HYSTERECTOMY: CPT | Mod: 52,,, | Performed by: OBSTETRICS & GYNECOLOGY

## 2020-02-10 PROCEDURE — 85014 HEMATOCRIT: CPT

## 2020-02-10 PROCEDURE — 88342 CHG IMMUNOCYTOCHEMISTRY: ICD-10-PCS | Mod: 26,,, | Performed by: PATHOLOGY

## 2020-02-10 PROCEDURE — 88307 PR  SURG PATH,LEVEL V: ICD-10-PCS | Mod: 26,,, | Performed by: PATHOLOGY

## 2020-02-10 PROCEDURE — 27201037 HC PRESSURE MONITORING SET UP

## 2020-02-10 PROCEDURE — 88341 PR IHC OR ICC EACH ADD'L SINGLE ANTIBODY  STAINPR: ICD-10-PCS | Mod: 26,,, | Performed by: PATHOLOGY

## 2020-02-10 PROCEDURE — P9045 ALBUMIN (HUMAN), 5%, 250 ML: HCPCS | Mod: JG | Performed by: NURSE ANESTHETIST, CERTIFIED REGISTERED

## 2020-02-10 PROCEDURE — 71000033 HC RECOVERY, INTIAL HOUR: Performed by: OBSTETRICS & GYNECOLOGY

## 2020-02-10 PROCEDURE — S0030 INJECTION, METRONIDAZOLE: HCPCS | Performed by: STUDENT IN AN ORGANIZED HEALTH CARE EDUCATION/TRAINING PROGRAM

## 2020-02-10 PROCEDURE — 88309 PR  SURG PATH,LEVEL VI: ICD-10-PCS | Mod: 26,,, | Performed by: PATHOLOGY

## 2020-02-10 PROCEDURE — 63600175 PHARM REV CODE 636 W HCPCS: Performed by: ANESTHESIOLOGY

## 2020-02-10 PROCEDURE — 88342 IMHCHEM/IMCYTCHM 1ST ANTB: CPT | Mod: 26,,, | Performed by: PATHOLOGY

## 2020-02-10 PROCEDURE — 63600175 PHARM REV CODE 636 W HCPCS: Performed by: OBSTETRICS & GYNECOLOGY

## 2020-02-10 PROCEDURE — 88307 TISSUE EXAM BY PATHOLOGIST: CPT | Performed by: PATHOLOGY

## 2020-02-10 PROCEDURE — 27201423 OPTIME MED/SURG SUP & DEVICES STERILE SUPPLY: Performed by: OBSTETRICS & GYNECOLOGY

## 2020-02-10 PROCEDURE — 25000003 PHARM REV CODE 250

## 2020-02-10 PROCEDURE — S0077 INJECTION, CLINDAMYCIN PHOSP: HCPCS | Performed by: OBSTETRICS & GYNECOLOGY

## 2020-02-10 PROCEDURE — 86920 COMPATIBILITY TEST SPIN: CPT

## 2020-02-10 PROCEDURE — 88342 IMHCHEM/IMCYTCHM 1ST ANTB: CPT | Performed by: PATHOLOGY

## 2020-02-10 PROCEDURE — 63600175 PHARM REV CODE 636 W HCPCS: Performed by: NURSE ANESTHETIST, CERTIFIED REGISTERED

## 2020-02-10 PROCEDURE — C1769 GUIDE WIRE: HCPCS | Performed by: OBSTETRICS & GYNECOLOGY

## 2020-02-10 PROCEDURE — 34502 RECONSTRUCT VENA CAVA: CPT | Mod: ,,, | Performed by: SURGERY

## 2020-02-10 PROCEDURE — 94761 N-INVAS EAR/PLS OXIMETRY MLT: CPT

## 2020-02-10 PROCEDURE — D9220A PRA ANESTHESIA: Mod: ANES,,, | Performed by: ANESTHESIOLOGY

## 2020-02-10 PROCEDURE — 38780 REMOVE ABDOMEN LYMPH NODES: CPT | Mod: 51,,, | Performed by: SURGERY

## 2020-02-10 PROCEDURE — 37000008 HC ANESTHESIA 1ST 15 MINUTES: Performed by: OBSTETRICS & GYNECOLOGY

## 2020-02-10 PROCEDURE — 53899: ICD-10-PCS | Mod: ,,, | Performed by: OBSTETRICS & GYNECOLOGY

## 2020-02-10 PROCEDURE — 81288 MLH1 GENE: CPT | Performed by: PATHOLOGY

## 2020-02-10 PROCEDURE — 53899 UNLISTED PX URINARY SYSTEM: CPT | Mod: ,,, | Performed by: OBSTETRICS & GYNECOLOGY

## 2020-02-10 PROCEDURE — 71000016 HC POSTOP RECOV ADDL HR: Performed by: OBSTETRICS & GYNECOLOGY

## 2020-02-10 PROCEDURE — 88341 IMHCHEM/IMCYTCHM EA ADD ANTB: CPT | Mod: 26,,, | Performed by: PATHOLOGY

## 2020-02-10 PROCEDURE — 63600175 PHARM REV CODE 636 W HCPCS

## 2020-02-10 PROCEDURE — 88309 TISSUE EXAM BY PATHOLOGIST: CPT | Performed by: PATHOLOGY

## 2020-02-10 PROCEDURE — 36000708 HC OR TIME LEV III 1ST 15 MIN: Performed by: OBSTETRICS & GYNECOLOGY

## 2020-02-10 PROCEDURE — 37000009 HC ANESTHESIA EA ADD 15 MINS: Performed by: OBSTETRICS & GYNECOLOGY

## 2020-02-10 PROCEDURE — C9290 INJ, BUPIVACAINE LIPOSOME: HCPCS | Performed by: OBSTETRICS & GYNECOLOGY

## 2020-02-10 PROCEDURE — 85018 HEMOGLOBIN: CPT

## 2020-02-10 PROCEDURE — 88305 TISSUE EXAM BY PATHOLOGIST: CPT | Mod: 26,,, | Performed by: PATHOLOGY

## 2020-02-10 PROCEDURE — D9220A PRA ANESTHESIA: ICD-10-PCS | Mod: CRNA,,, | Performed by: NURSE ANESTHETIST, CERTIFIED REGISTERED

## 2020-02-10 RX ORDER — METRONIDAZOLE 500 MG/100ML
INJECTION, SOLUTION INTRAVENOUS
Status: DISCONTINUED | OUTPATIENT
Start: 2020-02-10 | End: 2020-02-10

## 2020-02-10 RX ORDER — PHENYLEPHRINE HYDROCHLORIDE 10 MG/ML
INJECTION INTRAVENOUS
Status: DISCONTINUED | OUTPATIENT
Start: 2020-02-10 | End: 2020-02-10

## 2020-02-10 RX ORDER — LIDOCAINE HYDROCHLORIDE 10 MG/ML
1 INJECTION, SOLUTION EPIDURAL; INFILTRATION; INTRACAUDAL; PERINEURAL ONCE
Status: COMPLETED | OUTPATIENT
Start: 2020-02-10 | End: 2020-02-10

## 2020-02-10 RX ORDER — DEXTROSE MONOHYDRATE, SODIUM CHLORIDE, AND POTASSIUM CHLORIDE 50; 1.49; 4.5 G/1000ML; G/1000ML; G/1000ML
INJECTION, SOLUTION INTRAVENOUS CONTINUOUS
Status: DISCONTINUED | OUTPATIENT
Start: 2020-02-10 | End: 2020-02-11

## 2020-02-10 RX ORDER — MUPIROCIN 20 MG/G
1 OINTMENT TOPICAL 2 TIMES DAILY
Status: COMPLETED | OUTPATIENT
Start: 2020-02-10 | End: 2020-02-15

## 2020-02-10 RX ORDER — HYDROMORPHONE HYDROCHLORIDE 1 MG/ML
0.5 INJECTION, SOLUTION INTRAMUSCULAR; INTRAVENOUS; SUBCUTANEOUS
Status: DISCONTINUED | OUTPATIENT
Start: 2020-02-10 | End: 2020-02-11

## 2020-02-10 RX ORDER — SULFAMETHOXAZOLE AND TRIMETHOPRIM 400; 80 MG/1; MG/1
1 TABLET ORAL DAILY
Status: DISCONTINUED | OUTPATIENT
Start: 2020-02-10 | End: 2020-02-11

## 2020-02-10 RX ORDER — ALBUMIN HUMAN 50 G/1000ML
SOLUTION INTRAVENOUS CONTINUOUS PRN
Status: DISCONTINUED | OUTPATIENT
Start: 2020-02-10 | End: 2020-02-10

## 2020-02-10 RX ORDER — SODIUM CHLORIDE 9 MG/ML
INJECTION, SOLUTION INTRAVENOUS CONTINUOUS
Status: DISCONTINUED | OUTPATIENT
Start: 2020-02-10 | End: 2020-02-10

## 2020-02-10 RX ORDER — ENOXAPARIN SODIUM 100 MG/ML
40 INJECTION SUBCUTANEOUS EVERY 24 HOURS
Status: DISCONTINUED | OUTPATIENT
Start: 2020-02-11 | End: 2020-02-17 | Stop reason: HOSPADM

## 2020-02-10 RX ORDER — ROCURONIUM BROMIDE 10 MG/ML
INJECTION, SOLUTION INTRAVENOUS
Status: DISCONTINUED | OUTPATIENT
Start: 2020-02-10 | End: 2020-02-10

## 2020-02-10 RX ORDER — OXYCODONE HYDROCHLORIDE 5 MG/1
5 TABLET ORAL EVERY 4 HOURS PRN
Status: DISCONTINUED | OUTPATIENT
Start: 2020-02-10 | End: 2020-02-11

## 2020-02-10 RX ORDER — ONDANSETRON 2 MG/ML
4 INJECTION INTRAMUSCULAR; INTRAVENOUS EVERY 6 HOURS PRN
Status: DISCONTINUED | OUTPATIENT
Start: 2020-02-10 | End: 2020-02-11

## 2020-02-10 RX ORDER — HEPARIN SODIUM 5000 [USP'U]/ML
5000 INJECTION, SOLUTION INTRAVENOUS; SUBCUTANEOUS ONCE
Status: COMPLETED | OUTPATIENT
Start: 2020-02-10 | End: 2020-02-10

## 2020-02-10 RX ORDER — ONDANSETRON 2 MG/ML
INJECTION INTRAMUSCULAR; INTRAVENOUS
Status: DISCONTINUED | OUTPATIENT
Start: 2020-02-10 | End: 2020-02-10

## 2020-02-10 RX ORDER — METRONIDAZOLE 500 MG/100ML
500 INJECTION, SOLUTION INTRAVENOUS
Status: DISCONTINUED | OUTPATIENT
Start: 2020-02-10 | End: 2020-02-11

## 2020-02-10 RX ORDER — ACETAMINOPHEN 500 MG
1000 TABLET ORAL EVERY 8 HOURS
Status: DISCONTINUED | OUTPATIENT
Start: 2020-02-10 | End: 2020-02-12

## 2020-02-10 RX ORDER — SODIUM CHLORIDE 0.9 % (FLUSH) 0.9 %
10 SYRINGE (ML) INJECTION
Status: DISCONTINUED | OUTPATIENT
Start: 2020-02-10 | End: 2020-02-10 | Stop reason: HOSPADM

## 2020-02-10 RX ORDER — HYDROMORPHONE HYDROCHLORIDE 1 MG/ML
INJECTION, SOLUTION INTRAMUSCULAR; INTRAVENOUS; SUBCUTANEOUS
Status: COMPLETED
Start: 2020-02-10 | End: 2020-02-10

## 2020-02-10 RX ORDER — FENTANYL CITRATE 50 UG/ML
25 INJECTION, SOLUTION INTRAMUSCULAR; INTRAVENOUS EVERY 5 MIN PRN
Status: COMPLETED | OUTPATIENT
Start: 2020-02-10 | End: 2020-02-10

## 2020-02-10 RX ORDER — CLINDAMYCIN PHOSPHATE 900 MG/50ML
900 INJECTION, SOLUTION INTRAVENOUS
Status: COMPLETED | OUTPATIENT
Start: 2020-02-10 | End: 2020-02-10

## 2020-02-10 RX ORDER — IPRATROPIUM BROMIDE AND ALBUTEROL SULFATE 2.5; .5 MG/3ML; MG/3ML
3 SOLUTION RESPIRATORY (INHALATION) EVERY 4 HOURS PRN
Status: DISCONTINUED | OUTPATIENT
Start: 2020-02-10 | End: 2020-02-17 | Stop reason: HOSPADM

## 2020-02-10 RX ORDER — SCOLOPAMINE TRANSDERMAL SYSTEM 1 MG/1
1 PATCH, EXTENDED RELEASE TRANSDERMAL
Status: DISCONTINUED | OUTPATIENT
Start: 2020-02-10 | End: 2020-02-17 | Stop reason: HOSPADM

## 2020-02-10 RX ORDER — ONDANSETRON 2 MG/ML
4 INJECTION INTRAMUSCULAR; INTRAVENOUS DAILY PRN
Status: COMPLETED | OUTPATIENT
Start: 2020-02-10 | End: 2020-02-10

## 2020-02-10 RX ORDER — CIPROFLOXACIN 2 MG/ML
400 INJECTION, SOLUTION INTRAVENOUS
Status: COMPLETED | OUTPATIENT
Start: 2020-02-10 | End: 2020-02-10

## 2020-02-10 RX ORDER — ZOLPIDEM TARTRATE 5 MG/1
5 TABLET ORAL NIGHTLY PRN
Status: DISCONTINUED | OUTPATIENT
Start: 2020-02-10 | End: 2020-02-17 | Stop reason: HOSPADM

## 2020-02-10 RX ORDER — LOSARTAN POTASSIUM 25 MG/1
100 TABLET ORAL DAILY
Status: DISCONTINUED | OUTPATIENT
Start: 2020-02-10 | End: 2020-02-11

## 2020-02-10 RX ORDER — OXYCODONE HYDROCHLORIDE 10 MG/1
10 TABLET ORAL EVERY 4 HOURS PRN
Status: DISCONTINUED | OUTPATIENT
Start: 2020-02-10 | End: 2020-02-11

## 2020-02-10 RX ORDER — LIDOCAINE HYDROCHLORIDE 20 MG/ML
INJECTION INTRAVENOUS
Status: DISCONTINUED | OUTPATIENT
Start: 2020-02-10 | End: 2020-02-10

## 2020-02-10 RX ORDER — DOCUSATE SODIUM 100 MG/1
100 CAPSULE, LIQUID FILLED ORAL 2 TIMES DAILY
Status: DISCONTINUED | OUTPATIENT
Start: 2020-02-10 | End: 2020-02-17 | Stop reason: HOSPADM

## 2020-02-10 RX ORDER — HYDRALAZINE HYDROCHLORIDE 20 MG/ML
10 INJECTION INTRAMUSCULAR; INTRAVENOUS EVERY 6 HOURS PRN
Status: DISCONTINUED | OUTPATIENT
Start: 2020-02-10 | End: 2020-02-17 | Stop reason: HOSPADM

## 2020-02-10 RX ORDER — ACETAMINOPHEN 10 MG/ML
INJECTION, SOLUTION INTRAVENOUS
Status: DISCONTINUED | OUTPATIENT
Start: 2020-02-10 | End: 2020-02-10

## 2020-02-10 RX ORDER — BUPIVACAINE HYDROCHLORIDE 2.5 MG/ML
INJECTION, SOLUTION EPIDURAL; INFILTRATION; INTRACAUDAL
Status: DISCONTINUED | OUTPATIENT
Start: 2020-02-10 | End: 2020-02-10 | Stop reason: HOSPADM

## 2020-02-10 RX ORDER — DEXTROSE MONOHYDRATE, SODIUM CHLORIDE, AND POTASSIUM CHLORIDE 50; 1.49; 4.5 G/1000ML; G/1000ML; G/1000ML
INJECTION, SOLUTION INTRAVENOUS
Status: COMPLETED
Start: 2020-02-10 | End: 2020-02-10

## 2020-02-10 RX ORDER — HYDROMORPHONE HYDROCHLORIDE 1 MG/ML
0.2 INJECTION, SOLUTION INTRAMUSCULAR; INTRAVENOUS; SUBCUTANEOUS EVERY 5 MIN PRN
Status: DISCONTINUED | OUTPATIENT
Start: 2020-02-10 | End: 2020-02-10 | Stop reason: HOSPADM

## 2020-02-10 RX ORDER — DEXAMETHASONE SODIUM PHOSPHATE 4 MG/ML
INJECTION, SOLUTION INTRA-ARTICULAR; INTRALESIONAL; INTRAMUSCULAR; INTRAVENOUS; SOFT TISSUE
Status: DISCONTINUED | OUTPATIENT
Start: 2020-02-10 | End: 2020-02-10

## 2020-02-10 RX ORDER — PROPOFOL 10 MG/ML
VIAL (ML) INTRAVENOUS
Status: DISCONTINUED | OUTPATIENT
Start: 2020-02-10 | End: 2020-02-10

## 2020-02-10 RX ORDER — FENTANYL CITRATE 50 UG/ML
INJECTION, SOLUTION INTRAMUSCULAR; INTRAVENOUS
Status: DISCONTINUED | OUTPATIENT
Start: 2020-02-10 | End: 2020-02-10

## 2020-02-10 RX ORDER — MUPIROCIN 20 MG/G
OINTMENT TOPICAL
Status: DISCONTINUED | OUTPATIENT
Start: 2020-02-10 | End: 2020-02-10

## 2020-02-10 RX ORDER — SUCCINYLCHOLINE CHLORIDE 20 MG/ML
INJECTION INTRAMUSCULAR; INTRAVENOUS
Status: DISCONTINUED | OUTPATIENT
Start: 2020-02-10 | End: 2020-02-10

## 2020-02-10 RX ORDER — POTASSIUM CHLORIDE 14.9 MG/ML
INJECTION INTRAVENOUS CONTINUOUS PRN
Status: DISCONTINUED | OUTPATIENT
Start: 2020-02-10 | End: 2020-02-10

## 2020-02-10 RX ADMIN — HYDROMORPHONE HYDROCHLORIDE 0.2 MG: 1 INJECTION, SOLUTION INTRAMUSCULAR; INTRAVENOUS; SUBCUTANEOUS at 02:02

## 2020-02-10 RX ADMIN — METRONIDAZOLE 500 MG: 500 INJECTION, SOLUTION INTRAVENOUS at 09:02

## 2020-02-10 RX ADMIN — ROCURONIUM BROMIDE 20 MG: 10 INJECTION, SOLUTION INTRAVENOUS at 10:02

## 2020-02-10 RX ADMIN — SODIUM CHLORIDE, SODIUM GLUCONATE, SODIUM ACETATE, POTASSIUM CHLORIDE, MAGNESIUM CHLORIDE, SODIUM PHOSPHATE, DIBASIC, AND POTASSIUM PHOSPHATE: .53; .5; .37; .037; .03; .012; .00082 INJECTION, SOLUTION INTRAVENOUS at 07:02

## 2020-02-10 RX ADMIN — SUGAMMADEX 220 MG: 100 INJECTION, SOLUTION INTRAVENOUS at 12:02

## 2020-02-10 RX ADMIN — FENTANYL CITRATE 25 MCG: 50 INJECTION, SOLUTION INTRAMUSCULAR; INTRAVENOUS at 12:02

## 2020-02-10 RX ADMIN — CALCIUM CHLORIDE 100 MG: 100 INJECTION, SOLUTION INTRAVENOUS at 11:02

## 2020-02-10 RX ADMIN — ONDANSETRON 4 MG: 2 INJECTION INTRAMUSCULAR; INTRAVENOUS at 12:02

## 2020-02-10 RX ADMIN — PHENYLEPHRINE HYDROCHLORIDE 100 MCG: 10 INJECTION INTRAVENOUS at 10:02

## 2020-02-10 RX ADMIN — PROMETHAZINE HYDROCHLORIDE 12.5 MG: 25 INJECTION INTRAMUSCULAR; INTRAVENOUS at 04:02

## 2020-02-10 RX ADMIN — ONDANSETRON 4 MG: 2 INJECTION INTRAMUSCULAR; INTRAVENOUS at 10:02

## 2020-02-10 RX ADMIN — LIDOCAINE HYDROCHLORIDE 40 MG: 20 INJECTION, SOLUTION INTRAVENOUS at 07:02

## 2020-02-10 RX ADMIN — ALBUMIN (HUMAN): 12.5 SOLUTION INTRAVENOUS at 11:02

## 2020-02-10 RX ADMIN — ONDANSETRON 4 MG: 2 INJECTION INTRAMUSCULAR; INTRAVENOUS at 03:02

## 2020-02-10 RX ADMIN — SODIUM CHLORIDE, SODIUM GLUCONATE, SODIUM ACETATE, POTASSIUM CHLORIDE, MAGNESIUM CHLORIDE, SODIUM PHOSPHATE, DIBASIC, AND POTASSIUM PHOSPHATE: .53; .5; .37; .037; .03; .012; .00082 INJECTION, SOLUTION INTRAVENOUS at 10:02

## 2020-02-10 RX ADMIN — ONDANSETRON 4 MG: 2 INJECTION INTRAMUSCULAR; INTRAVENOUS at 11:02

## 2020-02-10 RX ADMIN — HYDROMORPHONE HYDROCHLORIDE 0.2 MG: 1 INJECTION, SOLUTION INTRAMUSCULAR; INTRAVENOUS; SUBCUTANEOUS at 01:02

## 2020-02-10 RX ADMIN — SODIUM CHLORIDE, SODIUM GLUCONATE, SODIUM ACETATE, POTASSIUM CHLORIDE, MAGNESIUM CHLORIDE, SODIUM PHOSPHATE, DIBASIC, AND POTASSIUM PHOSPHATE: .53; .5; .37; .037; .03; .012; .00082 INJECTION, SOLUTION INTRAVENOUS at 09:02

## 2020-02-10 RX ADMIN — ZOLPIDEM TARTRATE 5 MG: 5 TABLET, COATED ORAL at 09:02

## 2020-02-10 RX ADMIN — OXYCODONE HYDROCHLORIDE 5 MG: 5 TABLET ORAL at 06:02

## 2020-02-10 RX ADMIN — ROCURONIUM BROMIDE 15 MG: 10 INJECTION, SOLUTION INTRAVENOUS at 10:02

## 2020-02-10 RX ADMIN — LIDOCAINE HYDROCHLORIDE: 10 INJECTION, SOLUTION EPIDURAL; INFILTRATION; INTRACAUDAL; PERINEURAL at 06:02

## 2020-02-10 RX ADMIN — IBUPROFEN 800 MG: 800 INJECTION INTRAVENOUS at 11:02

## 2020-02-10 RX ADMIN — PHENYLEPHRINE HYDROCHLORIDE 50 MCG: 10 INJECTION INTRAVENOUS at 10:02

## 2020-02-10 RX ADMIN — FENTANYL CITRATE 25 MCG: 50 INJECTION INTRAMUSCULAR; INTRAVENOUS at 01:02

## 2020-02-10 RX ADMIN — ROCURONIUM BROMIDE 5 MG: 10 INJECTION, SOLUTION INTRAVENOUS at 07:02

## 2020-02-10 RX ADMIN — ROCURONIUM BROMIDE 30 MG: 10 INJECTION, SOLUTION INTRAVENOUS at 07:02

## 2020-02-10 RX ADMIN — CALCIUM CHLORIDE 500 MG: 100 INJECTION, SOLUTION INTRAVENOUS at 09:02

## 2020-02-10 RX ADMIN — PROPOFOL 120 MG: 10 INJECTION, EMULSION INTRAVENOUS at 07:02

## 2020-02-10 RX ADMIN — POTASSIUM CHLORIDE, DEXTROSE MONOHYDRATE AND SODIUM CHLORIDE: 150; 5; 450 INJECTION, SOLUTION INTRAVENOUS at 12:02

## 2020-02-10 RX ADMIN — ROCURONIUM BROMIDE 10 MG: 10 INJECTION, SOLUTION INTRAVENOUS at 11:02

## 2020-02-10 RX ADMIN — MUPIROCIN 1 G: 20 OINTMENT TOPICAL at 09:02

## 2020-02-10 RX ADMIN — HEPARIN SODIUM 5000 UNITS: 5000 INJECTION, SOLUTION INTRAVENOUS; SUBCUTANEOUS at 06:02

## 2020-02-10 RX ADMIN — ROCURONIUM BROMIDE 15 MG: 10 INJECTION, SOLUTION INTRAVENOUS at 08:02

## 2020-02-10 RX ADMIN — CALCIUM CHLORIDE 500 MG: 100 INJECTION, SOLUTION INTRAVENOUS at 08:02

## 2020-02-10 RX ADMIN — MUPIROCIN: 20 OINTMENT TOPICAL at 06:02

## 2020-02-10 RX ADMIN — FENTANYL CITRATE 50 MCG: 50 INJECTION, SOLUTION INTRAMUSCULAR; INTRAVENOUS at 12:02

## 2020-02-10 RX ADMIN — CLINDAMYCIN IN 5 PERCENT DEXTROSE 900 MG: 18 INJECTION, SOLUTION INTRAVENOUS at 07:02

## 2020-02-10 RX ADMIN — ACETAMINOPHEN 1000 MG: 500 TABLET ORAL at 08:02

## 2020-02-10 RX ADMIN — OXYCODONE HYDROCHLORIDE 10 MG: 5 TABLET ORAL at 01:02

## 2020-02-10 RX ADMIN — METRONIDAZOLE 500 MG: 500 SOLUTION INTRAVENOUS at 11:02

## 2020-02-10 RX ADMIN — POTASSIUM CHLORIDE: 14.9 INJECTION, SOLUTION INTRAVENOUS at 08:02

## 2020-02-10 RX ADMIN — FENTANYL CITRATE 50 MCG: 50 INJECTION, SOLUTION INTRAMUSCULAR; INTRAVENOUS at 09:02

## 2020-02-10 RX ADMIN — HYDROMORPHONE HYDROCHLORIDE 0.5 MG: 1 INJECTION, SOLUTION INTRAMUSCULAR; INTRAVENOUS; SUBCUTANEOUS at 10:02

## 2020-02-10 RX ADMIN — SUCCINYLCHOLINE CHLORIDE 120 MG: 20 INJECTION, SOLUTION INTRAMUSCULAR; INTRAVENOUS at 07:02

## 2020-02-10 RX ADMIN — ACETAMINOPHEN 1000 MG: 10 INJECTION, SOLUTION INTRAVENOUS at 11:02

## 2020-02-10 RX ADMIN — SODIUM CHLORIDE: 0.9 INJECTION, SOLUTION INTRAVENOUS at 06:02

## 2020-02-10 RX ADMIN — SCOPALAMINE 1 PATCH: 1 PATCH, EXTENDED RELEASE TRANSDERMAL at 06:02

## 2020-02-10 RX ADMIN — SULFAMETHOXAZOLE AND TRIMETHOPRIM 1 TABLET: 400; 80 TABLET ORAL at 10:02

## 2020-02-10 RX ADMIN — DOCUSATE SODIUM 100 MG: 100 CAPSULE, LIQUID FILLED ORAL at 09:02

## 2020-02-10 RX ADMIN — DEXAMETHASONE SODIUM PHOSPHATE 8 MG: 4 INJECTION, SOLUTION INTRAMUSCULAR; INTRAVENOUS at 08:02

## 2020-02-10 RX ADMIN — FENTANYL CITRATE 75 MCG: 50 INJECTION, SOLUTION INTRAMUSCULAR; INTRAVENOUS at 07:02

## 2020-02-10 RX ADMIN — PHENYLEPHRINE HYDROCHLORIDE 100 MCG: 10 INJECTION INTRAVENOUS at 11:02

## 2020-02-10 RX ADMIN — SODIUM CHLORIDE 0.25 MCG/KG/MIN: 9 INJECTION, SOLUTION INTRAVENOUS at 10:02

## 2020-02-10 RX ADMIN — LOSARTAN POTASSIUM 100 MG: 25 TABLET ORAL at 10:02

## 2020-02-10 RX ADMIN — ROCURONIUM BROMIDE 15 MG: 10 INJECTION, SOLUTION INTRAVENOUS at 09:02

## 2020-02-10 RX ADMIN — FENTANYL CITRATE 25 MCG: 50 INJECTION, SOLUTION INTRAMUSCULAR; INTRAVENOUS at 06:02

## 2020-02-10 RX ADMIN — IBUPROFEN 800 MG: 800 INJECTION INTRAVENOUS at 02:02

## 2020-02-10 RX ADMIN — FENTANYL CITRATE 50 MCG: 50 INJECTION, SOLUTION INTRAMUSCULAR; INTRAVENOUS at 08:02

## 2020-02-10 RX ADMIN — CIPROFLOXACIN 400 MG: 2 INJECTION, SOLUTION INTRAVENOUS at 06:02

## 2020-02-10 NOTE — H&P
The patient has been examined and the H&P has been reviewed:    I concur with the findings and no changes have occurred since H&P was written.    Anesthesia/Surgery risks, benefits and alternative options discussed and understood by patient/family.    Nela Bansal MD  OBGYN, PGY-2      Subjective:      Patient ID: Salome Moore is a 84 y.o. female.     Chief Complaint: uterine mass        HPI  Presents today for follow up visit.      CT in house 11/15/19 reviewed and shows irregular uterine mass and large right likely para-aortic node near the renal.   She has vacillated between palliation versus pursuing treatment so we elected for least invasive means of obtaining a tissue diagnosis.      S/p IR biopsy right para-aortic mass 12/11/9- shows metastatic disease consistent with endometrial origin.   Fibrous tissue (needle biopsies, submitted as right retroperitoneal lymph node):  -Adenocarcinoma with immunohistochemical features (positivity for ER, SD, and CK7) suggestive of an  endometrial primary in this clinical setting  -A battery of immunohistochemical stains is performed. These show tumor cells to exhibit moderate to  strong nuclear staining for both ER and SD. Cytoplasm is strongly positive for CK7 but is negative for CK20  and for CEA. Inhibin and calretinin are both negative. Positive and negative controls function appropriately.  -Note that other primary sites can give rise to carcinomas exhibiting a similar immunohistochemical staining  pattern, particularly breast. However, the clinical history of a large pelvic mass in conjunction with these  immunohistochemical findings would be more compatible with a pelvic primary     Presents today for follow up. Has thought about her diagnosis and wishes to pursue treatment at this time.     Referral history:  84 yr old para 3 referred from Dr. Jones for a uterine mass concerning for malignancy. Uterine mass found on CT after presenting to GI  complaining of nausea and abdominal pain of a few months duration. No VB.     CT per [facility] notes  11 cm uterus with large hypoattenuating, infiltrating mass measuring 7.7 x 10 cm. Cervical canal distended with small scattered right pelvic side wall lymph nodes present. 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     Pelvic US 10/30/2019  Uterus 9.3 x 6.7 x 7.9 cm, multiple uterine masses noted, possibly fibroids, 2.3 - 5.7 cm. ES normal thickness 4mm, portion obscured by overlying mass.  Ovaries not visualized  No free fluid     EMBx attempted and aborted due to cervical stenosis. Watery, yellow d/c noted on exam.     She reports no known history for fibroids.     LMP early 60s on HRT until last year.     No prior pelvic or abdominal surgeries.     Family history negative for breast, uterine, ovarian and colon cancer.     Review of Systems   Constitutional: Negative for appetite change, chills, fatigue and fever.   HENT: Negative for mouth sores.    Respiratory: Negative for cough and shortness of breath.    Cardiovascular: Negative for leg swelling.   Gastrointestinal: Negative for abdominal pain, blood in stool, constipation and diarrhea.   Endocrine: Negative for cold intolerance.   Genitourinary: Positive for pelvic pain and vaginal bleeding. Negative for dysuria.   Musculoskeletal: Negative for myalgias.   Skin: Negative for rash.   Allergic/Immunologic: Negative.    Neurological: Negative for weakness and numbness.   Hematological: Negative for adenopathy. Does not bruise/bleed easily.   Psychiatric/Behavioral: Negative for confusion.         Objective:   Physical Exam:   Constitutional: She is oriented to person, place, and time. She appears well-developed and well-nourished.    HENT:   Head: Normocephalic and atraumatic.    Eyes: Pupils are equal, round, and reactive to light. EOM are normal.    Neck: Normal range  "of motion. Neck supple. No thyromegaly present.    Cardiovascular: Normal rate, regular rhythm and intact distal pulses.     Pulmonary/Chest: Effort normal and breath sounds normal. No respiratory distress. She has no wheezes.         Abdominal: Soft. Bowel sounds are normal. She exhibits no distension, no ascites and no mass. There is no tenderness.     Genitourinary: Vagina normal. Pelvic exam was performed with patient supine. There is no lesion on the right labia. There is no lesion on the left labia. Uterus is enlarged. Uterus is not fixed. Cervix is normal. Right adnexum displays no mass. Left adnexum displays no mass.   Genitourinary Comments: Enlarged irregular shaped uterus with bleeding noted from os. Very mobile and amenable to surgical resection.            Musculoskeletal: Normal range of motion and moves all extremeties.      Lymphadenopathy:     She has no cervical adenopathy.        Right: No supraclavicular adenopathy present.        Left: No supraclavicular adenopathy present.    Neurological: She is alert and oriented to person, place, and time.    Skin: Skin is warm and dry. No rash noted.    Psychiatric: She has a normal mood and affect.         Assessment:      1. Retroperitoneal mass    2. Endometrial cancer          Plan:   No orders of the defined types were placed in this encounter.     We again reviewed her diagnosis of endometrial cancer. Standard management would include surgical debulking if feasible followed by adjuvant therapy. She has vacillated between pursuing treatment versus palliation at "her age". Although she is 84 she has a good performance status and I pursuing treatment is reasonable to consider. After some additional though she desires to pursue options for treatment.      Her pelvic disease is mobile and feels surgically resectable on exam. I have reviewed her case with surgical oncology as well and right high para-aortic mass may be feasible to resect as well though vena " cava reconstruction and other surrounding vasculature will make the resection more difficult. We will plan for a consult with surg onc to review this.     Will obtain CT Chest for full metastatic work up.   Addressed her suboptimal pain control. Rx oxycodone provided.

## 2020-02-10 NOTE — SUBJECTIVE & OBJECTIVE
Interval History:   Patient doing well post-op with pain adequately controlled. She is currently nauseated, no relief with zofran. No emesis. Not yet ambulating or voiding independently, maravilla in place. Denies flatus/BM.  Reports subjective wheezing that she can hear.      Scheduled Meds:   acetaminophen  1,000 mg Oral Q8H    docusate sodium  100 mg Oral BID    [START ON 2/11/2020] enoxaparin  40 mg Subcutaneous Daily    ibuprofen  800 mg Intravenous Q8H    metronidazole  500 mg Intravenous Q8H    mupirocin  1 g Nasal BID     Continuous Infusions:   dextrose 5 % and 0.45 % NaCl with KCl 20 mEq 125 mL/hr at 02/10/20 1255     PRN Meds:hydrALAZINE, HYDROmorphone, ondansetron, oxyCODONE, oxyCODONE, promethazine (PHENERGAN) IVPB, zolpidem    Review of patient's allergies indicates:   Allergen Reactions    Pcn [penicillins] Other (See Comments)     Patient can't remember the reaction. Reaction occurred over 60 years       Objective:     Vital Signs (Most Recent):  Temp: 97.7 °F (36.5 °C) (02/10/20 1400)  Pulse: 95 (02/10/20 1557)  Resp: 14 (02/10/20 1557)  BP: (!) 158/70 (02/10/20 1557)  SpO2: (!) 94 % (02/10/20 1515) Vital Signs (24h Range):  Temp:  [97.5 °F (36.4 °C)-98.1 °F (36.7 °C)] 97.7 °F (36.5 °C)  Pulse:  [] 95  Resp:  [10-18] 14  SpO2:  [93 %-97 %] 94 %  BP: (104-183)/(51-84) 158/70     Weight: 54.4 kg (120 lb)  Body mass index is 20.6 kg/m².    Intake/Output - Last 3 Shifts       02/08 0700 - 02/09 0659 02/09 0700 - 02/10 0659 02/10 0700 - 02/11 0659    I.V. (mL/kg)   3530 (64.9)    Blood   938    Total Intake(mL/kg)   4468 (82.1)    Urine (mL/kg/hr)   330 (0.7)    Blood   900    Total Output   1230    Net   +3238                    Physical Exam:   Constitutional: She is oriented to person, place, and time. She appears well-developed and well-nourished. No distress.    HENT:   Head: Normocephalic and atraumatic.    Eyes: Conjunctivae and EOM are normal.     Cardiovascular: Normal rate.      Pulmonary/Chest: Effort normal. No respiratory distress. She has no wheezes. She has rales. She exhibits no tenderness.   Faint crackles at bilateral bases, no wheezing appreciated.        Abdominal: Soft. She exhibits no distension. There is no tenderness. There is no rebound and no guarding.   BS present, hypoactive     Genitourinary:   Genitourinary Comments: Alan in place draining clear yellow urine           Musculoskeletal: She exhibits no edema or tenderness.   SCDs in place       Neurological: She is alert and oriented to person, place, and time.     Psychiatric: She has a normal mood and affect.       Lines/Drains/Airways     Drain                 Urethral Catheter 02/10/20 0800 Non-latex 16 Fr. less than 1 day          Peripheral Intravenous Line                 Peripheral IV - Single Lumen 02/10/20 0614 18 G Right Hand less than 1 day         Peripheral IV - Single Lumen 02/10/20 0726 18 G Left Wrist less than 1 day                Laboratory:  CBC:   Recent Labs   Lab 02/10/20  1254   HGB 8.4*   HCT 26.8*       Diagnostic Results:  n/a

## 2020-02-10 NOTE — ASSESSMENT & PLAN NOTE
- Delirium precautions ordered  - Multimodal pain regimen to minimize opioid use  - Patient adamant about having ambien for sleep   - instructed only to ask for it if truly necessary   - d/w RN, will not administer if groggy from other meds (opioids, antiemetics) and is fully oriented

## 2020-02-10 NOTE — HPI
Salome Moore is a 84 y.o. who presents as scheduled for debulking surgery for advanced stage endometrial cancer.

## 2020-02-10 NOTE — OP NOTE
[facility]  Operative Note    Date: 02/10/2020    Pre-Op Diagnosis:   Uterine mass    Post-Op Diagnosis: same    Procedure(s) Performed:   1.  Cystoscopy with right ureteral catheter placement    Specimen(s): none    Staff Surgeon: Taj Taylor MD    Assistant Surgeon: Ulises Molina MD, Pipe Alcocer MD     Anesthesia: General endotracheal anesthesia    Indications: Salome Moore is a 84 y.o. female with a uterine mass and periaortic node.  Adarsh Arredondo and Linda have requested intra-operative ureteral catheters to allow for early intra-operative identification and repair of any injuries as well as assistance with identifying the renal pelvis.    Findings:   Normal cystoscopy  5 Fr ureteral catheter placed in standard fashion    Estimated Blood Loss: min    Drains:   1.  Right 5 Fr ureteral catheter  2.  16 Fr maravilla catheter    Procedure in Detail: Upon entering the room the patient was under general anesthesia.  The patient was then placed in the dorsal lithotomy position and prepped and draped in the usual sterile fashion. Preoperative antibiotics were administered per the primary surgeon preference.  Timeout was performed.      A 22 Fr cystoscope was inserted into the urethra and formal cystourethroscopy was performed. The urethra was normal.  The right and left ureteral orifices were in the normal anatomic position. There were no mucosal abnormalities. A 5 Fr ureteral catheter was then inserted into the right UO. This was advanced until mild resistance was felt. This passed easily. The cystoscope was then removed leaving the ureteral catheter in place.     A 16 Fr maravilla catheter was inserted and the balloon was filled with 10mL of sterile water. The ureteral catheters were secured in the standard fashion. There were no complications with the procedure and the patient tolerated our procedure well.     The case was then turned over to the primary surgeon.     Ulises Molina MD

## 2020-02-10 NOTE — PROGRESS NOTES
Ms. Moore and her daughter seen in preop, all questions answered to her satisfaction. Will have urology perform right ureteral stent/catheter for intraop localization. Imaging reviewed: paracaval mass just below the takeoff of the right renal vein. I can trace both the renal artery and ureter which do not appear directly involved but in close proximity. Will attempt to remove node but prepared for partial or segmental caval resection/reconstruction and she understands this.    The risks and benefits of the proposed procedure were reviewed including bleeding, infection, damage to local structures, anastomotic leaks and their sequela, cardiovascular and pulmonary complications, injury to the right kidney, need for additional procedures, death, and imponderables.       Hiren Mckeon MD  Upper GI / Hepatobiliary Surgical Oncology  [facility] [place], [place]  Office: [phone]  Fax: [phone]

## 2020-02-10 NOTE — OP NOTE
[facility]  Surgery Department  Operative Note       Date of Procedure: 2/10/2020     Procedure: Procedure(s) (LRB):  HYSTERECTOMY, TOTAL, ABDOMINAL (N/A)  SALPINGO-OOPHORECTOMY, BILATERAL (N/A)  DEBULKING, NEOPLASM (N/A)  LYMPHADENECTOMY, RETROPERITONEUM (Right)  COLECTOMY (Right)  LYMPHADENECTOMY, PELVIS (Right)  CYSTOSCOPY (N/A)  CATHETERIZATION, URETER (Bilateral)     Surgeon(s) and Role:  Panel 1:     * Stephany Arredondo MD - Primary     * Veena Zabala MD - Resident - Assisting     * Ulises Molina MD - Resident - Assisting     * Nela Bansal MD - Resident - Assisting  Panel 2:     * Hiren Mckeon MD - Primary  Panel 3:     * Taj Taylor MD - Primary    Pre-Operative Diagnosis: Endometrial cancer [C54.1]  1. Retroperitoneal adenopathy    Post-Operative Diagnosis:   1. Same    Anesthesia: General    Operative Findings:   1. Direct involvement of the mesentery of the distal ileum and right colon necessitating right hemicolectomy   2. Large paracaval node just inferior to the right renal vein resected with repair of cavotomy    Procedures:  1. Retroperitoneal transabdominal lymphadenectomy  2. Side wall reconstruction of the vena cava by primary suture  3. Right hemicolectomy with removal of the terminal ileum with ileocolostomy    Estimated Blood Loss (EBL):  150 mL           Indications:  Salome Moore presents for the above procedures.  Risks and benefits were reviewed including bleeding, infection, damage to local structures, cardiovascular and pulmonary complications, need for additional procedures, death, and imponderables.  She understands and gave informed consent to proceed.    Details:  The patient was transported to the operating room and satisfactory anesthesia established.  Preoperative antibiotics were administered.  The patient was placed in the supine position and extremities were padded and protected throughout.  A maravilla catheter and  right ureteral stent were placed after induction by the urology team.     Dr. Arredondo and her team performed a laparotomy, JENA/BSO and associated lymph node dissection. Please see her operative dictation for details.    Once complete, I scrubbed into the procedure and we began with a very generous kocher maneuver to expose the retroduodenal vena cava. There was a large palpable firm mass of lymph nodes densely adherent to the side wall. The cava was completely mobilized and vessel loops were placed around the right renal vein and the cava above and below the level of the mass. A large dilated gonadal vein was divided between ties to facilitate exposure. The right renal artery was palpable behind the vein and was swept superiorly and was free of the mass. The previously placed ureteral stent facilitated its exposure to ensure the renal pelvis was safely mobilized laterally. Once I felt the exposure and control were adequate I began mobilizing the node off of the caval wall. There was a short large vein from the cava into the mass which was avulsed under control. Hemostasis was maintained with direct pressure while the remaining attachments to the cava were mobilized. This left about a 3 cm circular defect in the wall of the cava. This was reconstructed primarily with 4-0 prolene without narrowing the lumen.  The remaining attachments of the node to the psoas muscle and gerotas fascia were divided and the specimen sent off the field.    The right colon and mesentery of the terminal ileum were examined. There was a clearly malignant node/implant in the mesentery of the ileocecum. Additionally, there were malignant hard nodes along the ileocolic chain. The right colon was completely medialized and the duodenum  safely from it. A window in the mesentery was made and the ileocolic bundle was divided between ties at its base, sweeping these nodes into the specimen. The right branch of the middle colic was preserved.  The terminal ileum was divided and the proximal colon both with endo-DIANDRA stapler and the remaining mesentery taken with the ligasure including the concerning nodes. There was a great palpable pulse in the colonic arterial arcade. Enterotomies were made in the small bowl and colon and a side to side stapled anastomosis was fashioned. The common enterotomy was sewn in two layers with running 3-0 vicryl and 3-0 silk.     The abdomen was irrigated and hemostasis was obtained in the right psoas gutter and the pelvis.    Implants: * No implants in log *    Specimens:   Specimen (12h ago, onward)    None                  Condition: Good    Disposition: PACU - hemodynamically stable.    Attestation: I was present and performed the above dictated portions of the procedure.

## 2020-02-10 NOTE — HOSPITAL COURSE
02/10/2020: Underwent Ex Lap/JENA/BSO/LND for advanced stage endometrial cancer. Planned intra-operative consult with surgical oncology completed for large para-caval lymph node. 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 Right hemicolectomy also per surg onc. Received 3u pRBC intra-op (EBL 900cc). Overall patient tolerated well. See op notes for details.   2/11/2020: Slowly meeting post op milestones. CLD maintained due to nausea. +Voiding, +ambulating. Post op H/h 7.9/24.8.   2/12/2020: Diet advanced to full luquids. +Flatus.   02/13/2020: BM x 2 (diarrhea). Episode of SOB with palpitations. EKG with PVCs. CXR with questionable infiltrate vs atelectasis, suspect the latter. Doing well on RA.   2/14/2020: Intermittent tachycardia with SpO2 low 90s. CTA ordered to r/o PE. CTA neg for PE but notes moderate pleural effusions. Not requiring supplemental O2.  2/15/2020: Continues to meet post op milestones. Would like to be evaluated for skilled nursing.  02/17/2020:  Patient not accepted by SNF. Home health with PT/OT and nurse visits arranged.     On discharge day patient had episode of hypoxia to 88% requiring 2L NC. Known to have bilateral pleural effusions and some mild edema on CTA. Lasix administered with reported good response (patient adamantly refuses to void in hat for measurement). Able to wean off supplemental oxygen several hours after Lasix with SpO2 95% on room air. Patient OK to discharge.  Follow up with Dr. Arredondo.

## 2020-02-10 NOTE — PROGRESS NOTES
[facility]  Gynecologic Oncology  Progress Note      Patient Name: Salome Moore  MRN: [id number]  Admission Date: 2/10/2020  Hospital Length of Stay: 0 days  Attending Provider: Stephany Arredondo MD  Primary Care Provider: Ike Almeida MD  Principal Problem: S/P JENA-BSO    Follow-up For: Procedure(s) (LRB):  HYSTERECTOMY, TOTAL, ABDOMINAL (N/A)  SALPINGO-OOPHORECTOMY, BILATERAL (N/A)  DEBULKING, NEOPLASM (N/A)  LYMPHADENECTOMY, RETROPERITONEUM (Right)  COLECTOMY (Right)  LYMPHADENECTOMY, PELVIS (Right)  CYSTOSCOPY (N/A)  CATHETERIZATION, URETER (Bilateral)  Post-Operative Day: Day of Surgery  Subjective:      History of Present Illness:  Salome Moore is a 84 y.o. who presents as scheduled for debulking surgery for advanced stage endometrial cancer.    Hospital Course:  02/10/2020: Underwent Ex Lap/JENA/BSO/LND for advanced stage endometrial cancer. Planned intra-operative consult with surgical oncology completed for large para-caval lymph node. 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 Right hemicolectomy also per surg onc. Received 3u pRBC intra-op (EBL 900cc). Overall patient tolerated well. See op notes for details.     Interval History:   Patient doing well post-op with pain adequately controlled. She is currently nauseated, no relief with zofran. No emesis. Not yet ambulating or voiding independently, maravilla in place. Denies flatus/BM.  Reports subjective wheezing that she can hear.      Scheduled Meds:   acetaminophen  1,000 mg Oral Q8H    docusate sodium  100 mg Oral BID    [START ON 2/11/2020] enoxaparin  40 mg Subcutaneous Daily    ibuprofen  800 mg Intravenous Q8H    metronidazole  500 mg Intravenous Q8H    mupirocin  1 g Nasal BID     Continuous Infusions:   dextrose 5 % and 0.45 % NaCl with KCl 20 mEq 125 mL/hr at 02/10/20 1255     PRN Meds:hydrALAZINE, HYDROmorphone, ondansetron, oxyCODONE, oxyCODONE, promethazine  (PHENERGAN) IVPB, zolpidem    Review of patient's allergies indicates:   Allergen Reactions    Pcn [penicillins] Other (See Comments)     Patient can't remember the reaction. Reaction occurred over 60 years       Objective:     Vital Signs (Most Recent):  Temp: 97.7 °F (36.5 °C) (02/10/20 1400)  Pulse: 95 (02/10/20 1557)  Resp: 14 (02/10/20 1557)  BP: (!) 158/70 (02/10/20 1557)  SpO2: (!) 94 % (02/10/20 1515) Vital Signs (24h Range):  Temp:  [97.5 °F (36.4 °C)-98.1 °F (36.7 °C)] 97.7 °F (36.5 °C)  Pulse:  [] 95  Resp:  [10-18] 14  SpO2:  [93 %-97 %] 94 %  BP: (104-183)/(51-84) 158/70     Weight: 54.4 kg (120 lb)  Body mass index is 20.6 kg/m².    Intake/Output - Last 3 Shifts       02/08 0700 - 02/09 0659 02/09 0700 - 02/10 0659 02/10 0700 - 02/11 0659    I.V. (mL/kg)   3530 (64.9)    Blood   938    Total Intake(mL/kg)   4468 (82.1)    Urine (mL/kg/hr)   330 (0.7)    Blood   900    Total Output   1230    Net   +3238                    Physical Exam:   Constitutional: She is oriented to person, place, and time. She appears well-developed and well-nourished. No distress.    HENT:   Head: Normocephalic and atraumatic.    Eyes: Conjunctivae and EOM are normal.     Cardiovascular: Normal rate.     Pulmonary/Chest: Effort normal. No respiratory distress. She has no wheezes. She has rales. She exhibits no tenderness.   Faint crackles at bilateral bases, no wheezing appreciated.        Abdominal: Soft. She exhibits no distension. There is no tenderness. There is no rebound and no guarding.   BS present, hypoactive     Genitourinary:   Genitourinary Comments: Alan in place draining clear yellow urine           Musculoskeletal: She exhibits no edema or tenderness.   SCDs in place       Neurological: She is alert and oriented to person, place, and time.     Psychiatric: She has a normal mood and affect.       Lines/Drains/Airways     Drain                 Urethral Catheter 02/10/20 0800 Non-latex 16 Fr. less than 1 day           Peripheral Intravenous Line                 Peripheral IV - Single Lumen 02/10/20 0614 18 G Right Hand less than 1 day         Peripheral IV - Single Lumen 02/10/20 0726 18 G Left Wrist less than 1 day                Laboratory:  CBC:   Recent Labs   Lab 02/10/20  1254   HGB 8.4*   HCT 26.8*       Diagnostic Results:  n/a    Assessment/Plan:     * S/P JENA-BSO/Right colectomy/para-caval node resection Feb 2020  Routine post op care  - Pain control: attempting to minimize narcotics in this 80yo patient   - sched IV ibuprofen   - sched PO acetaminophen   - oxy 5/10 PRN    - dilaudid 0.5 q3h PRN only if absolutely necessary  - Diet: clears, ADAT; cont IVFs (decreased to 75cc/hr) until tolerating PO  - Maravilla: maintain overnight  - Labs: PACU h/h 8/26, repeat CBC/BMP in AM  - PPx: lovenox ppx to begin tomorrow, SCDs, early ambulation, IS to bedside        Constipation  - colace BID  - will add additional agents PRN    History of confusion  - Delirium precautions ordered  - Multimodal pain regimen to minimize opioid use  - Patient adamant about having ambien for sleep   - instructed only to ask for it if truly necessary   - d/w RN, will not administer if groggy from other meds (opioids, antiemetics) and is fully oriented    Endometrial cancer  - advanced disease at time of surgery  - now optimally debulked  - will need adjuvant chemotherapy after recovers    Essential hypertension  - hold home ARB in post op setting  - hydralazine 10mg IV ordered PRN SBP> 180      VTE Risk Mitigation (From admission, onward)         Ordered     enoxaparin injection 40 mg  Daily      02/10/20 1241     IP VTE HIGH RISK PATIENT  Once      02/10/20 1241                Was maravilla catheter removed? No: d/c POD#1    Veena Zabala MD  OB/GYN, PGY-4

## 2020-02-10 NOTE — ASSESSMENT & PLAN NOTE
- stage IV disease at time of surgery 2/10/2020  - will need adjuvant chemotherapy once recovered from debulking surgery  - optimally debulked at this time

## 2020-02-10 NOTE — ANESTHESIA POSTPROCEDURE EVALUATION
Anesthesia Post Evaluation    Patient: Salome Moore    Procedure(s) Performed: Procedure(s) (LRB):  HYSTERECTOMY, TOTAL, ABDOMINAL (N/A)  SALPINGO-OOPHORECTOMY, BILATERAL (N/A)  DEBULKING, NEOPLASM (N/A)  LYMPHADENECTOMY, RETROPERITONEUM (Right)  COLECTOMY (Right)  LYMPHADENECTOMY, PELVIS (Right)  CYSTOSCOPY (N/A)  CATHETERIZATION, URETER (Bilateral)    Final Anesthesia Type: general    Patient location during evaluation: PACU  Patient participation: Yes- Able to Participate  Level of consciousness: awake and alert  Post-procedure vital signs: reviewed and stable  Pain management: adequate  Airway patency: patent    PONV status at discharge: No PONV  Anesthetic complications: no      Cardiovascular status: blood pressure returned to baseline  Respiratory status: unassisted  Hydration status: euvolemic  Follow-up not needed.          Vitals Value Taken Time   /57 2/10/2020  1:16 PM   Temp 36.4 °C (97.5 °F) 2/10/2020 12:45 PM   Pulse 82 2/10/2020  1:27 PM   Resp 14 2/10/2020  1:27 PM   SpO2 94 % 2/10/2020  1:27 PM   Vitals shown include unvalidated device data.      No case tracking events are documented in the log.      Pain/Naila Score: Pain Rating Prior to Med Admin: 8 (2/10/2020  1:14 PM)  Naila Score: 8 (2/10/2020 12:45 PM)

## 2020-02-10 NOTE — ANESTHESIA PROCEDURE NOTES
Arterial    Diagnosis: uterine mass    Patient location during procedure: done in OR  Procedure start time: 2/10/2020 7:28 AM  Timeout: 2/10/2020 7:28 AM  Procedure end time: 2/10/2020 7:37 AM    Staffing  Authorizing Provider: Ladonna Hernandez MD  Performing Provider: Ladonna Hernandez MD    Anesthesiologist was present at the time of the procedure.    Preanesthetic Checklist  Completed: patient identified, site marked, surgical consent, pre-op evaluation, timeout performed, IV checked, risks and benefits discussed, monitors and equipment checked and anesthesia consent givenArterial  Skin Prep: chlorhexidine gluconate  Local Infiltration: none  Orientation: right  Location: radial  Catheter Size: 20 G  Catheter placement by Anatomical landmarks. Heme positive aspiration all ports.Insertion Attempts: 2  Assessment  Dressing: secured with tape and tegaderm  Patient: Tolerated well

## 2020-02-10 NOTE — PROGRESS NOTES
Report called to Rylie RN, pt made ready for transport to 803 via stretcher per PCT, resting quietly,VSS, pt states abd pain 4/10 and tolerable, dressing dry and intact, gregorio po ice chips, maravilla draining cloudy pink urine to gravity, daughters updated on room number, stable at present.

## 2020-02-10 NOTE — BRIEF OP NOTE
[facility]  Brief Operative Note    SUMMARY     Surgery Date: 2/10/2020     Surgeon(s) and Role:  Panel 1:     * Stephany Arredondo MD - Primary     * Veena Zabala MD - Resident - Assisting     * Ulises Molina MD - Resident - Assisting     * Nela Bansal MD - Resident - Assisting  Panel 2:     * Hiren Mckeon MD - Primary  Panel 3:     * Taj Taylor MD - Primary        Pre-op Diagnosis:  Endometrial cancer [C54.1]    Post-op Diagnosis:  Post-Op Diagnosis Codes:     * Endometrial cancer [C54.1]    Procedure(s) (LRB):  HYSTERECTOMY, TOTAL, ABDOMINAL (N/A)  SALPINGO-OOPHORECTOMY, BILATERAL (N/A)  DEBULKING, NEOPLASM (N/A)  LYMPHADENECTOMY, RETROPERITONEUM (Right)  COLECTOMY (Right)  LYMPHADENECTOMY, PELVIS (Right)  CYSTOSCOPY (N/A)  CATHETERIZATION, URETER (Bilateral)    Anesthesia: General    Description of the findings of the procedure:   Extensive disease extending through uterine serosa and into cecum and right ascending colon. Additional palpation of presumably involved right obturator lymph node, right colon as above, mesenteric lymph node, and para-caval lymph node. There was also a small portion of vesical peritoneum involved, area resected. Paracaval node, IVC repair, and hemicolectomy per surgical oncology, see separate note. Otherwise fairly uncomplicated JENA/BSO. Right ureteral stent placed prior to procedure per urology. Stent removed prior to exit from OR.    Estimated Blood Loss: 900 mL         Specimens:   Specimen (12h ago, onward)    None

## 2020-02-10 NOTE — ASSESSMENT & PLAN NOTE
Routine post op care  - Pain control: attempting to minimize narcotics in this 82yo patient   - sched IV ibuprofen   - sched PO acetaminophen   - oxy 5/10 PRN    - dilaudid 0.5 q3h PRN only if absolutely necessary  - Diet: clears, ADAT; cont IVFs (decreased to 75cc/hr) until tolerating PO  - Alan: maintain overnight  - Labs: PACU h/h 8/26, repeat CBC/BMP in AM  - PPx: lovenox ppx to begin tomorrow, SCDs, early ambulation, IS to bedside

## 2020-02-10 NOTE — BRIEF OP NOTE
[facility]  Brief Operative Note: Surgery Oncology    SUMMARY     Surgery Date: 2/10/2020     Surgeon(s) and Role:  Panel 1:     * Setphany Arredondo MD - Primary     * Veena Zabala MD - Resident - Assisting     * Ulises Molina MD - Resident - Assisting     * Nela Bansal MD - Resident - Assisting  Panel 2:     * Hiren Mckeon MD - Primary  Panel 3:     * Taj Taylor MD - Primary        Pre-op Diagnosis:  Endometrial cancer [C54.1]    Post-op Diagnosis:  Post-Op Diagnosis Codes:     * Endometrial cancer [C54.1]    Procedure(s) (LRB):  HYSTERECTOMY, TOTAL, ABDOMINAL (N/A)  SALPINGO-OOPHORECTOMY, BILATERAL (N/A)  DEBULKING, NEOPLASM (N/A)  LYMPHADENECTOMY, RETROPERITONEUM (Right)  COLECTOMY (Right)  LYMPHADENECTOMY, PELVIS (Right)  CYSTOSCOPY (N/A)  CATHETERIZATION, URETER (Bilateral)    Anesthesia: General    Description of Procedure: Paracaval mass removal, mesenteric lymph node removal, and right hemicolectomy     Description of the findings of the procedure: Enlarged, solid paracaval lymph node/mass near the right kidney removed. Enlarged mesenteric lymph node found and removed and right hemicolectomy completed.     Estimated Blood Loss: 900 mL         Specimens: Pending    See GYN op note for rest of procedure    Elisabet Alonso, R4  General Surgery   [phone]  2/10/2020 12:54 PM

## 2020-02-10 NOTE — PLAN OF CARE
Patient transferred from PACU via stretcher to room 803. Daughters at bedside. VSS. Afebrile. Patient c/o nausea, PRN anti-emetics given. Patient oriented to room and instructed on how to use call light, pt verbalized understanding. SCDs and lili hose in place. IVF @ 75. Safety precautions in place. Bed locked in lowest position. Side rails up x2. Non skid footwear on. Patient stable. Will continue to monitor.

## 2020-02-10 NOTE — HOSPITAL COURSE
02/10/2020: Underwent Ex Lap/JENA-BSO/R hemicolectomy/R LND/Paracaval LND with repair of small IVC defect. Surgical oncology with planned intra-operative consult. Received 3u pRBC intra-op. See op note for details. Patient tolerated well.

## 2020-02-10 NOTE — TRANSFER OF CARE
"Anesthesia Transfer of Care Note    Patient: Salome Moore    Procedure(s) Performed: Procedure(s) (LRB):  HYSTERECTOMY, TOTAL, ABDOMINAL (N/A)  SALPINGO-OOPHORECTOMY, BILATERAL (N/A)  DEBULKING, NEOPLASM (N/A)  LYMPHADENECTOMY, RETROPERITONEUM (Right)  COLECTOMY (Right)  LYMPHADENECTOMY, PELVIS (Right)  CYSTOSCOPY (N/A)  CATHETERIZATION, URETER (Bilateral)    Patient location: PACU    Anesthesia Type: general    Transport from OR: Transported from OR on 2-3 L/min O2 by NC with adequate spontaneous ventilation    Post pain: adequate analgesia    Post assessment: no apparent anesthetic complications    Post vital signs: stable    Level of consciousness: awake    Nausea/Vomiting: no nausea/vomiting    Complications: none    Transfer of care protocol was followed      Last vitals:   Visit Vitals  BP (!) 178/74   Pulse 64   Temp 36.7 °C (98.1 °F)   Resp 16   Ht 5' 4" (1.626 m)   Wt 54.4 kg (120 lb)   SpO2 (!) 93%   Breastfeeding? No   BMI 20.60 kg/m²     "

## 2020-02-11 PROBLEM — D64.9 POSTOPERATIVE ANEMIA: Status: ACTIVE | Noted: 2020-02-11

## 2020-02-11 LAB
ABO + RH BLD: NORMAL
ANION GAP SERPL CALC-SCNC: 6 MMOL/L (ref 8–16)
BASOPHILS # BLD AUTO: 0.02 K/UL (ref 0–0.2)
BASOPHILS NFR BLD: 0.2 % (ref 0–1.9)
BLD GP AB SCN CELLS X3 SERPL QL: NORMAL
BUN SERPL-MCNC: 10 MG/DL (ref 8–23)
CALCIUM SERPL-MCNC: 7.9 MG/DL (ref 8.7–10.5)
CHLORIDE SERPL-SCNC: 106 MMOL/L (ref 95–110)
CO2 SERPL-SCNC: 23 MMOL/L (ref 23–29)
CREAT SERPL-MCNC: 0.8 MG/DL (ref 0.5–1.4)
DIFFERENTIAL METHOD: ABNORMAL
EOSINOPHIL # BLD AUTO: 0 K/UL (ref 0–0.5)
EOSINOPHIL NFR BLD: 0.2 % (ref 0–8)
ERYTHROCYTE [DISTWIDTH] IN BLOOD BY AUTOMATED COUNT: 14.7 % (ref 11.5–14.5)
EST. GFR  (AFRICAN AMERICAN): >60 ML/MIN/1.73 M^2
EST. GFR  (NON AFRICAN AMERICAN): >60 ML/MIN/1.73 M^2
GLUCOSE SERPL-MCNC: 131 MG/DL (ref 70–110)
HCT VFR BLD AUTO: 24.8 % (ref 37–48.5)
HGB BLD-MCNC: 7.9 G/DL (ref 12–16)
IMM GRANULOCYTES # BLD AUTO: 0.03 K/UL (ref 0–0.04)
IMM GRANULOCYTES NFR BLD AUTO: 0.3 % (ref 0–0.5)
LYMPHOCYTES # BLD AUTO: 2 K/UL (ref 1–4.8)
LYMPHOCYTES NFR BLD: 17.6 % (ref 18–48)
MCH RBC QN AUTO: 28.9 PG (ref 27–31)
MCHC RBC AUTO-ENTMCNC: 31.9 G/DL (ref 32–36)
MCV RBC AUTO: 91 FL (ref 82–98)
MONOCYTES # BLD AUTO: 1.3 K/UL (ref 0.3–1)
MONOCYTES NFR BLD: 11.5 % (ref 4–15)
NEUTROPHILS # BLD AUTO: 8 K/UL (ref 1.8–7.7)
NEUTROPHILS NFR BLD: 70.2 % (ref 38–73)
NRBC BLD-RTO: 0 /100 WBC
PLATELET # BLD AUTO: 270 K/UL (ref 150–350)
PMV BLD AUTO: 9.8 FL (ref 9.2–12.9)
POTASSIUM SERPL-SCNC: 4.3 MMOL/L (ref 3.5–5.1)
RBC # BLD AUTO: 2.73 M/UL (ref 4–5.4)
SODIUM SERPL-SCNC: 135 MMOL/L (ref 136–145)
WBC # BLD AUTO: 11.35 K/UL (ref 3.9–12.7)

## 2020-02-11 PROCEDURE — 25000003 PHARM REV CODE 250: Performed by: STUDENT IN AN ORGANIZED HEALTH CARE EDUCATION/TRAINING PROGRAM

## 2020-02-11 PROCEDURE — 63600175 PHARM REV CODE 636 W HCPCS: Performed by: STUDENT IN AN ORGANIZED HEALTH CARE EDUCATION/TRAINING PROGRAM

## 2020-02-11 PROCEDURE — S0028 INJECTION, FAMOTIDINE, 20 MG: HCPCS | Performed by: STUDENT IN AN ORGANIZED HEALTH CARE EDUCATION/TRAINING PROGRAM

## 2020-02-11 PROCEDURE — 94761 N-INVAS EAR/PLS OXIMETRY MLT: CPT

## 2020-02-11 PROCEDURE — 86850 RBC ANTIBODY SCREEN: CPT

## 2020-02-11 PROCEDURE — S0030 INJECTION, METRONIDAZOLE: HCPCS | Performed by: STUDENT IN AN ORGANIZED HEALTH CARE EDUCATION/TRAINING PROGRAM

## 2020-02-11 PROCEDURE — 80048 BASIC METABOLIC PNL TOTAL CA: CPT

## 2020-02-11 PROCEDURE — 20600001 HC STEP DOWN PRIVATE ROOM

## 2020-02-11 PROCEDURE — 85025 COMPLETE CBC W/AUTO DIFF WBC: CPT

## 2020-02-11 PROCEDURE — 25000003 PHARM REV CODE 250: Performed by: OBSTETRICS & GYNECOLOGY

## 2020-02-11 PROCEDURE — 99024 POSTOP FOLLOW-UP VISIT: CPT | Mod: ,,, | Performed by: OBSTETRICS & GYNECOLOGY

## 2020-02-11 PROCEDURE — 99024 PR POST-OP FOLLOW-UP VISIT: ICD-10-PCS | Mod: ,,, | Performed by: OBSTETRICS & GYNECOLOGY

## 2020-02-11 PROCEDURE — 36415 COLL VENOUS BLD VENIPUNCTURE: CPT

## 2020-02-11 RX ORDER — SODIUM CHLORIDE 9 MG/ML
INJECTION, SOLUTION INTRAVENOUS CONTINUOUS
Status: DISCONTINUED | OUTPATIENT
Start: 2020-02-11 | End: 2020-02-13

## 2020-02-11 RX ORDER — SULFAMETHOXAZOLE AND TRIMETHOPRIM 400; 80 MG/1; MG/1
1 TABLET ORAL
Status: DISCONTINUED | OUTPATIENT
Start: 2020-02-12 | End: 2020-02-11

## 2020-02-11 RX ORDER — SULFAMETHOXAZOLE AND TRIMETHOPRIM 400; 80 MG/1; MG/1
1 TABLET ORAL
Status: DISCONTINUED | OUTPATIENT
Start: 2020-02-11 | End: 2020-02-17 | Stop reason: HOSPADM

## 2020-02-11 RX ORDER — ENOXAPARIN SODIUM 100 MG/ML
40 INJECTION SUBCUTANEOUS DAILY
Qty: 8.4 ML | Refills: 0 | Status: SHIPPED | OUTPATIENT
Start: 2020-02-11 | End: 2020-03-09

## 2020-02-11 RX ORDER — MORPHINE SULFATE 2 MG/ML
4 INJECTION, SOLUTION INTRAMUSCULAR; INTRAVENOUS
Status: DISCONTINUED | OUTPATIENT
Start: 2020-02-11 | End: 2020-02-11

## 2020-02-11 RX ORDER — TRAMADOL HYDROCHLORIDE 50 MG/1
50 TABLET ORAL EVERY 6 HOURS PRN
Status: DISCONTINUED | OUTPATIENT
Start: 2020-02-11 | End: 2020-02-14

## 2020-02-11 RX ORDER — TRAMADOL HYDROCHLORIDE 50 MG/1
100 TABLET ORAL EVERY 6 HOURS PRN
Status: DISCONTINUED | OUTPATIENT
Start: 2020-02-11 | End: 2020-02-15

## 2020-02-11 RX ORDER — ONDANSETRON 2 MG/ML
8 INJECTION INTRAMUSCULAR; INTRAVENOUS EVERY 6 HOURS PRN
Status: DISCONTINUED | OUTPATIENT
Start: 2020-02-11 | End: 2020-02-17 | Stop reason: HOSPADM

## 2020-02-11 RX ORDER — ACETAMINOPHEN 650 MG/1
650 SUPPOSITORY RECTAL EVERY 6 HOURS PRN
Status: DISCONTINUED | OUTPATIENT
Start: 2020-02-11 | End: 2020-02-13

## 2020-02-11 RX ORDER — LOSARTAN POTASSIUM 25 MG/1
100 TABLET ORAL
Status: DISCONTINUED | OUTPATIENT
Start: 2020-02-12 | End: 2020-02-11

## 2020-02-11 RX ORDER — MORPHINE SULFATE 2 MG/ML
4 INJECTION, SOLUTION INTRAMUSCULAR; INTRAVENOUS
Status: DISCONTINUED | OUTPATIENT
Start: 2020-02-11 | End: 2020-02-17 | Stop reason: HOSPADM

## 2020-02-11 RX ORDER — LOSARTAN POTASSIUM 25 MG/1
100 TABLET ORAL
Status: DISCONTINUED | OUTPATIENT
Start: 2020-02-11 | End: 2020-02-17 | Stop reason: HOSPADM

## 2020-02-11 RX ORDER — HYDROCODONE BITARTRATE AND ACETAMINOPHEN 500; 5 MG/1; MG/1
TABLET ORAL
Status: DISCONTINUED | OUTPATIENT
Start: 2020-02-11 | End: 2020-02-13

## 2020-02-11 RX ORDER — FAMOTIDINE 10 MG/ML
20 INJECTION INTRAVENOUS DAILY
Status: DISCONTINUED | OUTPATIENT
Start: 2020-02-11 | End: 2020-02-13

## 2020-02-11 RX ADMIN — ENOXAPARIN SODIUM 40 MG: 100 INJECTION SUBCUTANEOUS at 04:02

## 2020-02-11 RX ADMIN — HYDROMORPHONE HYDROCHLORIDE 0.5 MG: 1 INJECTION, SOLUTION INTRAMUSCULAR; INTRAVENOUS; SUBCUTANEOUS at 06:02

## 2020-02-11 RX ADMIN — SULFAMETHOXAZOLE AND TRIMETHOPRIM 1 TABLET: 400; 80 TABLET ORAL at 09:02

## 2020-02-11 RX ADMIN — ONDANSETRON 8 MG: 2 INJECTION INTRAMUSCULAR; INTRAVENOUS at 09:02

## 2020-02-11 RX ADMIN — ZOLPIDEM TARTRATE 5 MG: 5 TABLET, COATED ORAL at 09:02

## 2020-02-11 RX ADMIN — PROMETHAZINE HYDROCHLORIDE 12.5 MG: 25 INJECTION INTRAMUSCULAR; INTRAVENOUS at 05:02

## 2020-02-11 RX ADMIN — FAMOTIDINE 20 MG: 10 INJECTION, SOLUTION INTRAVENOUS at 03:02

## 2020-02-11 RX ADMIN — POTASSIUM CHLORIDE, DEXTROSE MONOHYDRATE AND SODIUM CHLORIDE: 150; 5; 450 INJECTION, SOLUTION INTRAVENOUS at 05:02

## 2020-02-11 RX ADMIN — PROMETHAZINE HYDROCHLORIDE 12.5 MG: 25 INJECTION INTRAMUSCULAR; INTRAVENOUS at 11:02

## 2020-02-11 RX ADMIN — MUPIROCIN 1 G: 20 OINTMENT TOPICAL at 09:02

## 2020-02-11 RX ADMIN — DOCUSATE SODIUM 100 MG: 100 CAPSULE, LIQUID FILLED ORAL at 08:02

## 2020-02-11 RX ADMIN — ONDANSETRON 8 MG: 2 INJECTION INTRAMUSCULAR; INTRAVENOUS at 03:02

## 2020-02-11 RX ADMIN — DOCUSATE SODIUM 100 MG: 100 CAPSULE, LIQUID FILLED ORAL at 09:02

## 2020-02-11 RX ADMIN — METRONIDAZOLE 500 MG: 500 INJECTION, SOLUTION INTRAVENOUS at 05:02

## 2020-02-11 RX ADMIN — MUPIROCIN 1 G: 20 OINTMENT TOPICAL at 08:02

## 2020-02-11 RX ADMIN — TRAMADOL HYDROCHLORIDE 50 MG: 50 TABLET, FILM COATED ORAL at 03:02

## 2020-02-11 RX ADMIN — LOSARTAN POTASSIUM 100 MG: 25 TABLET ORAL at 09:02

## 2020-02-11 RX ADMIN — Medication 800 MG: at 03:02

## 2020-02-11 RX ADMIN — OXYCODONE HYDROCHLORIDE 5 MG: 5 TABLET ORAL at 11:02

## 2020-02-11 RX ADMIN — SODIUM CHLORIDE: 0.9 INJECTION, SOLUTION INTRAVENOUS at 03:02

## 2020-02-11 RX ADMIN — Medication 800 MG: at 09:02

## 2020-02-11 RX ADMIN — IBUPROFEN 800 MG: 800 INJECTION INTRAVENOUS at 06:02

## 2020-02-11 NOTE — PLAN OF CARE
Patient is AAO3, up with assist, fall precautions maintained. POC reviewed with pt. C/o pain; PRN pain meds given with moderate relief.  C/o nausea; PRN anti-emetics given. IVF @ 100 ml/hr. Refusing PO Tylenol. Voiding clear yellow urine in bathroom into hat. No BM or flatus this shift. VSS. Afebrile.  Frequent rounds made to assess pain and safety. Patient remained free from falls. Bed in lowest position. Side rails up X 2. Call light within reach. Will continue to monitor.

## 2020-02-11 NOTE — PROGRESS NOTES
[facility]  Gynecologic Oncology  Progress Note      Patient Name: Salome Moore  MRN: [id number]  Admission Date: 2/10/2020  Hospital Length of Stay: 1 days  Attending Provider: Stephany Arredondo MD  Primary Care Provider: Ike Almeida MD  Principal Problem: S/P JENA-BSO    Follow-up For: Procedure(s) (LRB):  HYSTERECTOMY, TOTAL, ABDOMINAL (N/A)  SALPINGO-OOPHORECTOMY, BILATERAL (N/A)  DEBULKING, NEOPLASM (N/A)  LYMPHADENECTOMY, RETROPERITONEUM (Right)  COLECTOMY (Right)  LYMPHADENECTOMY, PELVIS (Right)  CYSTOSCOPY (N/A)  CATHETERIZATION, URETER (Bilateral)  Post-Operative Day: 1 Day Post-Op  Subjective:      History of Present Illness:  Salome Moore is a 84 y.o. who presents as scheduled for debulking surgery for advanced stage endometrial cancer.    Hospital Course:  02/10/2020: Underwent Ex Lap/JENA/BSO/LND for advanced stage endometrial cancer. Planned intra-operative consult with surgical oncology completed for large para-caval lymph node. 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 Right hemicolectomy also per surg onc. Received 3u pRBC intra-op (EBL 900cc). Overall patient tolerated well. See op notes for details.     Interval History:   Patient doing well post-op with sub-optimal pain control (not taking PO meds 2/2 nausea, previously refusing IV meds 2/2 concern for side effects). She is nauseated, not taking in much by mouth.  Emesis x 1 overnight. Not yet ambulating or voiding independently, maravilla in place. Denies flatus/BM.         Scheduled Meds:   acetaminophen  1,000 mg Oral Q8H    docusate sodium  100 mg Oral BID    enoxaparin  40 mg Subcutaneous Daily    ibuprofen  800 mg Intravenous Q8H    losartan  100 mg Oral Daily    metronidazole  500 mg Intravenous Q8H    mupirocin  1 g Nasal BID    scopolamine  1 patch Transdermal Q3 Days    sulfamethoxazole-trimethoprim 400-80mg  1 tablet Oral Daily     Continuous  Infusions:   dextrose 5 % and 0.45 % NaCl with KCl 20 mEq 75 mL/hr at 02/11/20 0525     PRN Meds:albuterol-ipratropium, hydrALAZINE, HYDROmorphone, ondansetron, oxyCODONE, oxyCODONE, promethazine (PHENERGAN) IVPB, zolpidem    Review of patient's allergies indicates:   Allergen Reactions    Pcn [penicillins] Other (See Comments)     Patient can't remember the reaction. Reaction occurred over 60 years       Objective:     Vital Signs (Most Recent):  Temp: 98.3 °F (36.8 °C) (02/10/20 2331)  Pulse: (!) 118 (02/10/20 2345)  Resp: 17 (02/10/20 2331)  BP: (!) 141/74 (02/10/20 2331)  SpO2: (!) 90 % (02/10/20 2331) Vital Signs (24h Range):  Temp:  [97.5 °F (36.4 °C)-98.3 °F (36.8 °C)] 98.3 °F (36.8 °C)  Pulse:  [] 118  Resp:  [10-18] 17  SpO2:  [88 %-97 %] 90 %  BP: (104-178)/(51-80) 141/74     Weight: 54.4 kg (120 lb)  Body mass index is 20.6 kg/m².    Intake/Output - Last 3 Shifts       02/09 0700 - 02/10 0659 02/10 0700 - 02/11 0659    I.V. (mL/kg)  3530 (64.9)    Blood  938    Total Intake(mL/kg)  4468 (82.1)    Urine (mL/kg/hr)  1230 (0.9)    Blood  900    Total Output  2130    Net  +2338                   Physical Exam:   Constitutional: She is oriented to person, place, and time. She appears well-developed and well-nourished. No distress.    HENT:   Head: Normocephalic and atraumatic.    Eyes: Conjunctivae and EOM are normal.     Cardiovascular: Normal rate.     Pulmonary/Chest: Effort normal and breath sounds normal. No respiratory distress. She has no wheezes. She has no rales. She exhibits no tenderness.        Abdominal: Soft. She exhibits abdominal incision (midline vertical dressing c/d/i). She exhibits no distension. There is no tenderness. There is no rebound and no guarding.   BS present, hypoactive     Genitourinary:   Genitourinary Comments: Alan in place draining pink-tinged urine           Musculoskeletal: She exhibits no edema or tenderness.   SCDs in place       Neurological: She is alert and  oriented to person, place, and time.    Skin: Skin is warm and dry. No rash noted.    Psychiatric: She has a normal mood and affect.       Lines/Drains/Airways     Drain                 Urethral Catheter 02/10/20 0800 Non-latex 16 Fr. less than 1 day          Peripheral Intravenous Line                 Peripheral IV - Single Lumen 02/10/20 0614 18 G Right Hand less than 1 day         Peripheral IV - Single Lumen 02/10/20 0726 18 G Left Wrist less than 1 day                Laboratory:  CBC:   Recent Labs   Lab 02/10/20  1155 02/10/20  1254 02/11/20  0430   WBC  --   --  11.35   HGB  --  8.4* 7.9*   HCT 19* 26.8* 24.8*   PLT  --   --  270       Diagnostic Results:  n/a    Assessment/Plan:     * S/P JENA-BSO/Right colectomy/para-caval node resection Feb 2020  Routine post op care  - Pain control: attempting to minimize narcotics in this 82yo patient   - sched IV ibuprofen   - sched PO acetaminophen (will add PRN suppository if not tolerating PO)   - oxy 5/10 PRN    - switch to morphine from dilaudid per request  - Diet: clears, ADAT; cont IVFs (decreased to 75cc/hr) until tolerating PO  - Maravilla: d/c this AM  - PPx: lovenox ppx to begin today, SCDs, early ambulation, IS to bedside        Postoperative anemia  - Hg 7.9 this AM  - 1 unit pRBC ordered to be held  - will give if +sx today    Constipation  - colace BID  - will add additional agents PRN    History of confusion  - Delirium precautions ordered  - Multimodal pain regimen to minimize opioid use  - Patient adamant about having ambien for sleep    Endometrial cancer  - advanced disease at time of surgery  - now optimally debulked  - will need adjuvant chemotherapy after recovers    Essential hypertension  - continue Losartan  - hydralazine 10mg IV ordered PRN SBP> 180    VTE Risk Mitigation (From admission, onward)         Ordered     enoxaparin injection 40 mg  Daily      02/10/20 1241     IP VTE HIGH RISK PATIENT  Once      02/10/20 1241                Was maravilla  catheter removed? Yes    Veena Zabala MD  OB/GYN, PGY-4  [phone]

## 2020-02-11 NOTE — CARE UPDATE
Rapid Response Nurse Chart Check     Chart check completed, abnormal VS noted. Bedside RN Marley contacted, no concerns verbalized at this time, instructed to call [phone] for further concerns or assistance.

## 2020-02-11 NOTE — ASSESSMENT & PLAN NOTE
- Delirium precautions ordered  - Multimodal pain regimen to minimize opioid use  - Patient adamant about having ambien for sleep

## 2020-02-11 NOTE — PROGRESS NOTES
Complains of pain and nausea this PM. Tolerating clears but continues to have nausea. Diet backed off to CLD. Pain improving with IV meds. Now ambulating and voiding independently. No bowel function.    Temp:  [97.8 °F (36.6 °C)-98.3 °F (36.8 °C)] 98.1 °F (36.7 °C)  Pulse:  [] 82  Resp:  [10-20] 18  SpO2:  [88 %-94 %] 92 %  BP: (123-173)/(58-80) 126/58    Gen: A&Ox3, NAD  CV: RRR  Pulm: LCTAB  Abd: hypoactive bowel sounds, soft, non-distended, non-tender to palpation without rebound or guarding  Inc: dressing c/d/i  Ext: PPP, no peripheral edema, TEDs in place, SCDs reportedly plugged in while in bed    A/P POD 1 s/p ex lap/tumor debulking  - midline consulted for IV access as continuing IV antiemetics and analgesics overnight  - lovenox to start this PM  - CLD  - continue IVFs at this time  - PTOT consult placed    Veena Zabala MD  OB/GYN, PGY-4

## 2020-02-11 NOTE — ASSESSMENT & PLAN NOTE
Routine post op care  - Pain control: attempting to minimize narcotics in this 82yo patient   - sched IV ibuprofen   - sched PO acetaminophen (will add PRN suppository if not tolerating PO)   - oxy 5/10 PRN    - switch to morphine from dilaudid per request  - Diet: clears, ADAT; cont IVFs (decreased to 75cc/hr) until tolerating PO  - Alan: d/c this AM  - PPx: lovenox ppx to begin today, SCDs, early ambulation, IS to bedside

## 2020-02-11 NOTE — CONSULTS
Midline placed  in right brachial;, [id number] Lot# [id number] date on or before 3/10/20 Needle advanced into vessel with real time ultrasound guidance. Image recorded and saved.

## 2020-02-11 NOTE — SUBJECTIVE & OBJECTIVE
Interval History:   Patient doing well post-op with sub-optimal pain control (not taking PO meds 2/2 nausea, previously refusing IV meds 2/2 concern for side effects). She is nauseated, not taking in much by mouth.  Emesis x 1 overnight. Not yet ambulating or voiding independently, maravilla in place. Denies flatus/BM.         Scheduled Meds:   acetaminophen  1,000 mg Oral Q8H    docusate sodium  100 mg Oral BID    enoxaparin  40 mg Subcutaneous Daily    ibuprofen  800 mg Intravenous Q8H    losartan  100 mg Oral Daily    metronidazole  500 mg Intravenous Q8H    mupirocin  1 g Nasal BID    scopolamine  1 patch Transdermal Q3 Days    sulfamethoxazole-trimethoprim 400-80mg  1 tablet Oral Daily     Continuous Infusions:   dextrose 5 % and 0.45 % NaCl with KCl 20 mEq 75 mL/hr at 02/11/20 0525     PRN Meds:albuterol-ipratropium, hydrALAZINE, HYDROmorphone, ondansetron, oxyCODONE, oxyCODONE, promethazine (PHENERGAN) IVPB, zolpidem    Review of patient's allergies indicates:   Allergen Reactions    Pcn [penicillins] Other (See Comments)     Patient can't remember the reaction. Reaction occurred over 60 years       Objective:     Vital Signs (Most Recent):  Temp: 98.3 °F (36.8 °C) (02/10/20 2331)  Pulse: (!) 118 (02/10/20 2345)  Resp: 17 (02/10/20 2331)  BP: (!) 141/74 (02/10/20 2331)  SpO2: (!) 90 % (02/10/20 2331) Vital Signs (24h Range):  Temp:  [97.5 °F (36.4 °C)-98.3 °F (36.8 °C)] 98.3 °F (36.8 °C)  Pulse:  [] 118  Resp:  [10-18] 17  SpO2:  [88 %-97 %] 90 %  BP: (104-178)/(51-80) 141/74     Weight: 54.4 kg (120 lb)  Body mass index is 20.6 kg/m².    Intake/Output - Last 3 Shifts       02/09 0700 - 02/10 0659 02/10 0700 - 02/11 0659    I.V. (mL/kg)  3530 (64.9)    Blood  938    Total Intake(mL/kg)  4468 (82.1)    Urine (mL/kg/hr)  1230 (0.9)    Blood  900    Total Output  2130    Net  +2338                   Physical Exam:   Constitutional: She is oriented to person, place, and time. She appears  well-developed and well-nourished. No distress.    HENT:   Head: Normocephalic and atraumatic.    Eyes: Conjunctivae and EOM are normal.     Cardiovascular: Normal rate.     Pulmonary/Chest: Effort normal and breath sounds normal. No respiratory distress. She has no wheezes. She has no rales. She exhibits no tenderness.        Abdominal: Soft. She exhibits abdominal incision (midline vertical dressing c/d/i). She exhibits no distension. There is no tenderness. There is no rebound and no guarding.   BS present, hypoactive     Genitourinary:   Genitourinary Comments: Alan in place draining pink-tinged urine           Musculoskeletal: She exhibits no edema or tenderness.   SCDs in place       Neurological: She is alert and oriented to person, place, and time.    Skin: Skin is warm and dry. No rash noted.    Psychiatric: She has a normal mood and affect.       Lines/Drains/Airways     Drain                 Urethral Catheter 02/10/20 0800 Non-latex 16 Fr. less than 1 day          Peripheral Intravenous Line                 Peripheral IV - Single Lumen 02/10/20 0614 18 G Right Hand less than 1 day         Peripheral IV - Single Lumen 02/10/20 0726 18 G Left Wrist less than 1 day                Laboratory:  CBC:   Recent Labs   Lab 02/10/20  1155 02/10/20  1254 02/11/20  0430   WBC  --   --  11.35   HGB  --  8.4* 7.9*   HCT 19* 26.8* 24.8*   PLT  --   --  270       Diagnostic Results:  n/a

## 2020-02-11 NOTE — PROGRESS NOTES
Patient voided  300 cc of urine. Bladder scan after resulted in no urine left in bladder. Patient stable. Will continue to monitor.

## 2020-02-11 NOTE — ASSESSMENT & PLAN NOTE
85y/o female now POD 1 from [facility]-BSO/right colectomy/para-caval node resection. She tolerated the procedure well. Received 3 units of pRBCs in the OR. Having some pain control issue and nausea/emesis. Not tolerating clear liquids yet. She may have an ileus after a bowel resection so would take it slow.    -CLD, consider keeping on clears today  -encourage OOBTC and ambulation as tolerated  -Nausea control  -Rest of care per primary team  -Follow-up pathology  -Surg Onc will continue to follow    Elisabet Alonso, R4  General Surgery   [phone]  2/11/2020 7:38 AM

## 2020-02-11 NOTE — PLAN OF CARE
POC reviewed with pt. C/o pain; PRN IV Dilaudid given. C/o nausea; PRN Compazine given. Refusing PO Tylenol.  C/o being too hot and too cold throughout shift. Gave pt a new room. Catheter removed this AM. Frequent rounds made to assess pain and safety. Patient remained free from falls. Bed in lowest position. Side rails up X 2. Call light within reach. Will continue to monitor.

## 2020-02-11 NOTE — PROGRESS NOTES
[facility]  General Surgery  Progress Note    Subjective:     History of Present Illness:  No notes on file    Post-Op Info:  Procedure(s) (LRB):  HYSTERECTOMY, TOTAL, ABDOMINAL (N/A)  SALPINGO-OOPHORECTOMY, BILATERAL (N/A)  DEBULKING, NEOPLASM (N/A)  LYMPHADENECTOMY, RETROPERITONEUM (Right)  COLECTOMY (Right)  LYMPHADENECTOMY, PELVIS (Right)  CYSTOSCOPY (N/A)  CATHETERIZATION, URETER (Bilateral)   1 Day Post-Op     Interval History: Only had a couple sips of clear liquids. Had one episode of emesis overnight and still feels nauseous. States she is having a lot of pain diffusely. No flatus or BM. Good urine output.     Medications:  Continuous Infusions:   dextrose 5 % and 0.45 % NaCl with KCl 20 mEq 75 mL/hr at 02/11/20 0525     Scheduled Meds:   acetaminophen  1,000 mg Oral Q8H    docusate sodium  100 mg Oral BID    enoxaparin  40 mg Subcutaneous Daily    ibuprofen  800 mg Intravenous Q8H    ibuprofen  800 mg Intravenous Q8H    losartan  100 mg Oral Daily    mupirocin  1 g Nasal BID    scopolamine  1 patch Transdermal Q3 Days    sulfamethoxazole-trimethoprim 400-80mg  1 tablet Oral Daily     PRN Meds:sodium chloride, acetaminophen, albuterol-ipratropium, hydrALAZINE, morphine, ondansetron, oxyCODONE, oxyCODONE, promethazine (PHENERGAN) IVPB, zolpidem     Review of patient's allergies indicates:   Allergen Reactions    Pcn [penicillins] Other (See Comments)     Patient can't remember the reaction. Reaction occurred over 60 years     Objective:     Vital Signs (Most Recent):  Temp: 98.3 °F (36.8 °C) (02/10/20 2331)  Pulse: (!) 118 (02/10/20 2345)  Resp: 17 (02/10/20 2331)  BP: (!) 141/74 (02/10/20 2331)  SpO2: (!) 90 % (02/10/20 2331) Vital Signs (24h Range):  Temp:  [97.5 °F (36.4 °C)-98.3 °F (36.8 °C)] 98.3 °F (36.8 °C)  Pulse:  [] 118  Resp:  [10-18] 17  SpO2:  [88 %-97 %] 90 %  BP: (104-173)/(51-80) 141/74     Weight: 54.4 kg (120 lb)  Body mass index is 20.6  kg/m².    Intake/Output - Last 3 Shifts       02/09 0700 - 02/10 0659 02/10 0700 - 02/11 0659 02/11 0700 - 02/12 0659    I.V. (mL/kg)  3530 (64.9)     Blood  938     Total Intake(mL/kg)  4468 (82.1)     Urine (mL/kg/hr)  1230 (0.9) 800 (29.4)    Blood  900     Total Output  2130 800    Net  +2338 -800                 Physical Exam   Constitutional: She is oriented to person, place, and time. No distress.   HENT:   Head: Normocephalic and atraumatic.   Eyes: Pupils are equal, round, and reactive to light.   Neck: Neck supple.   Cardiovascular: Normal rate.   Pulmonary/Chest: Effort normal.   Abdominal: Soft. She exhibits distension (mildly distended). There is tenderness (appropriately tender, more on the right abdomen. No rebound or guarding. No peritoneal signs.).   Neurological: She is alert and oriented to person, place, and time.   Skin: Skin is warm.   Psychiatric: She has a normal mood and affect.       Significant Labs:  CBC:   Recent Labs   Lab 02/11/20  0430   WBC 11.35   RBC 2.73*   HGB 7.9*   HCT 24.8*      MCV 91   MCH 28.9   MCHC 31.9*     BMP:   Recent Labs   Lab 02/11/20  0430   *   *   K 4.3      CO2 23   BUN 10   CREATININE 0.8   CALCIUM 7.9*     CMP:   Recent Labs   Lab 02/10/20  1254 02/11/20  0430   * 131*   CALCIUM 7.7* 7.9*   ALBUMIN 2.7*  --    PROT 4.3*  --     135*   K 3.9 4.3   CO2 21* 23    106   BUN 10 10   CREATININE 0.7 0.8   ALKPHOS 55  --    ALT 8*  --    AST 24  --    BILITOT 2.0*  --      LFTs:   Recent Labs   Lab 02/10/20  1254   ALT 8*   AST 24   ALKPHOS 55   BILITOT 2.0*   PROT 4.3*   ALBUMIN 2.7*     Significant Diagnostics:  No new imaging     Assessment/Plan:     * S/P JENA-BSO/Right colectomy/para-caval node resection Feb 2020  83y/o female now POD 1 from JENA-BSO/right colectomy/para-caval node resection. She tolerated the procedure well. Received 3 units of pRBCs in the OR. Having some pain control issue and nausea/emesis. Not  tolerating clear liquids yet. She may have an ileus after a bowel resection so would take it slow.    -CLD, consider keeping on clears today  -encourage OOBTC and ambulation as tolerated  -Nausea control  -Rest of care per primary team  -Follow-up pathology  -Surg Onc will continue to follow    LUANNE Prado  General Surgery   [phone]  2/11/2020 7:38 AM           Elisabet Alonso MD  General Surgery  [facility]

## 2020-02-11 NOTE — PHYSICIAN QUERY
"PT Name: Salome Moore  MR #: [id number]    Physician Query Form - Hematology Clarification      CDS/: HAM Hernández,RNC-[id number]         Contact information:[email or web address]    This form is a permanent document in the medical record.      Query Date: February 11, 2020    By submitting this query, we are merely seeking further clarification of documentation. Please utilize your independent clinical judgment when addressing the question(s) below.    The Medical record contains the following:   Indicators  Supporting Clinical Findings Location in Medical Record   X "Anemia" documented Postoperative anemia Gyn Progress note 2/11@645am   X H & H = Hgb=7.9-8.4  Hct=24.8-26.8 LAB 2/10-2/11    BP =                     HR=      "GI bleeding" documented     X Acute bleeding (Non GI site) intra-op (EBL 900cc).  Gyn Progress note 2/11@645am   X Transfusion(s) Received 3u pRBC intra-op     1 unit pRBC ordered to be held  - will give if +sx today Gyn Progress note 2/11@645am    Treatment:     X Other:  Underwent Ex Lap/JENA/BSO/LND for advanced stage endometrial cancer Gyn Progress note 2/11@645am     Provider, please specify diagnosis or diagnoses associated with above clinical findings.    [ X ] Acute blood loss anemia expected post-operatively   [  ] Acute blood loss anemia     [  ] Other Hematological Diagnosis (please specify):     [  ] Clinically Undetermined       Please document in your progress notes daily for the duration of treatment, until resolved, and include in your discharge summary.                                                                                                      "

## 2020-02-11 NOTE — SUBJECTIVE & OBJECTIVE
Interval History: Only had a couple sips of clear liquids. Had one episode of emesis overnight and still feels nauseous. States she is having a lot of pain diffusely. No flatus or BM. Good urine output.     Medications:  Continuous Infusions:   dextrose 5 % and 0.45 % NaCl with KCl 20 mEq 75 mL/hr at 02/11/20 0525     Scheduled Meds:   acetaminophen  1,000 mg Oral Q8H    docusate sodium  100 mg Oral BID    enoxaparin  40 mg Subcutaneous Daily    ibuprofen  800 mg Intravenous Q8H    ibuprofen  800 mg Intravenous Q8H    losartan  100 mg Oral Daily    mupirocin  1 g Nasal BID    scopolamine  1 patch Transdermal Q3 Days    sulfamethoxazole-trimethoprim 400-80mg  1 tablet Oral Daily     PRN Meds:sodium chloride, acetaminophen, albuterol-ipratropium, hydrALAZINE, morphine, ondansetron, oxyCODONE, oxyCODONE, promethazine (PHENERGAN) IVPB, zolpidem     Review of patient's allergies indicates:   Allergen Reactions    Pcn [penicillins] Other (See Comments)     Patient can't remember the reaction. Reaction occurred over 60 years     Objective:     Vital Signs (Most Recent):  Temp: 98.3 °F (36.8 °C) (02/10/20 2331)  Pulse: (!) 118 (02/10/20 2345)  Resp: 17 (02/10/20 2331)  BP: (!) 141/74 (02/10/20 2331)  SpO2: (!) 90 % (02/10/20 2331) Vital Signs (24h Range):  Temp:  [97.5 °F (36.4 °C)-98.3 °F (36.8 °C)] 98.3 °F (36.8 °C)  Pulse:  [] 118  Resp:  [10-18] 17  SpO2:  [88 %-97 %] 90 %  BP: (104-173)/(51-80) 141/74     Weight: 54.4 kg (120 lb)  Body mass index is 20.6 kg/m².    Intake/Output - Last 3 Shifts       02/09 0700 - 02/10 0659 02/10 0700 - 02/11 0659 02/11 0700 - 02/12 0659    I.V. (mL/kg)  3530 (64.9)     Blood  938     Total Intake(mL/kg)  4468 (82.1)     Urine (mL/kg/hr)  1230 (0.9) 800 (29.4)    Blood  900     Total Output  2130 800    Net  +2338 -800                 Physical Exam   Constitutional: She is oriented to person, place, and time. No distress.   HENT:   Head: Normocephalic and atraumatic.    Eyes: Pupils are equal, round, and reactive to light.   Neck: Neck supple.   Cardiovascular: Normal rate.   Pulmonary/Chest: Effort normal.   Abdominal: Soft. She exhibits distension (mildly distended). There is tenderness (appropriately tender, more on the right abdomen. No rebound or guarding. No peritoneal signs.).   Neurological: She is alert and oriented to person, place, and time.   Skin: Skin is warm.   Psychiatric: She has a normal mood and affect.       Significant Labs:  CBC:   Recent Labs   Lab 02/11/20  0430   WBC 11.35   RBC 2.73*   HGB 7.9*   HCT 24.8*      MCV 91   MCH 28.9   MCHC 31.9*     BMP:   Recent Labs   Lab 02/11/20  0430   *   *   K 4.3      CO2 23   BUN 10   CREATININE 0.8   CALCIUM 7.9*     CMP:   Recent Labs   Lab 02/10/20  1254 02/11/20  0430   * 131*   CALCIUM 7.7* 7.9*   ALBUMIN 2.7*  --    PROT 4.3*  --     135*   K 3.9 4.3   CO2 21* 23    106   BUN 10 10   CREATININE 0.7 0.8   ALKPHOS 55  --    ALT 8*  --    AST 24  --    BILITOT 2.0*  --      LFTs:   Recent Labs   Lab 02/10/20  1254   ALT 8*   AST 24   ALKPHOS 55   BILITOT 2.0*   PROT 4.3*   ALBUMIN 2.7*     Significant Diagnostics:  No new imaging

## 2020-02-12 LAB
ANION GAP SERPL CALC-SCNC: 8 MMOL/L (ref 8–16)
BASOPHILS # BLD AUTO: 0.05 K/UL (ref 0–0.2)
BASOPHILS NFR BLD: 0.4 % (ref 0–1.9)
BUN SERPL-MCNC: 9 MG/DL (ref 8–23)
CALCIUM SERPL-MCNC: 7.9 MG/DL (ref 8.7–10.5)
CHLORIDE SERPL-SCNC: 111 MMOL/L (ref 95–110)
CO2 SERPL-SCNC: 21 MMOL/L (ref 23–29)
CREAT SERPL-MCNC: 0.7 MG/DL (ref 0.5–1.4)
DIFFERENTIAL METHOD: ABNORMAL
EOSINOPHIL # BLD AUTO: 0.4 K/UL (ref 0–0.5)
EOSINOPHIL NFR BLD: 2.9 % (ref 0–8)
ERYTHROCYTE [DISTWIDTH] IN BLOOD BY AUTOMATED COUNT: 14.7 % (ref 11.5–14.5)
EST. GFR  (AFRICAN AMERICAN): >60 ML/MIN/1.73 M^2
EST. GFR  (NON AFRICAN AMERICAN): >60 ML/MIN/1.73 M^2
GLUCOSE SERPL-MCNC: 98 MG/DL (ref 70–110)
HCT VFR BLD AUTO: 23.3 % (ref 37–48.5)
HGB BLD-MCNC: 7.7 G/DL (ref 12–16)
IMM GRANULOCYTES # BLD AUTO: 0.05 K/UL (ref 0–0.04)
IMM GRANULOCYTES NFR BLD AUTO: 0.4 % (ref 0–0.5)
LYMPHOCYTES # BLD AUTO: 2 K/UL (ref 1–4.8)
LYMPHOCYTES NFR BLD: 15.8 % (ref 18–48)
MCH RBC QN AUTO: 29.5 PG (ref 27–31)
MCHC RBC AUTO-ENTMCNC: 33 G/DL (ref 32–36)
MCV RBC AUTO: 89 FL (ref 82–98)
MONOCYTES # BLD AUTO: 1 K/UL (ref 0.3–1)
MONOCYTES NFR BLD: 8.1 % (ref 4–15)
NEUTROPHILS # BLD AUTO: 9 K/UL (ref 1.8–7.7)
NEUTROPHILS NFR BLD: 72.4 % (ref 38–73)
NRBC BLD-RTO: 0 /100 WBC
PLATELET # BLD AUTO: 302 K/UL (ref 150–350)
PMV BLD AUTO: 9.4 FL (ref 9.2–12.9)
POTASSIUM SERPL-SCNC: 3.7 MMOL/L (ref 3.5–5.1)
RBC # BLD AUTO: 2.61 M/UL (ref 4–5.4)
SODIUM SERPL-SCNC: 140 MMOL/L (ref 136–145)
WBC # BLD AUTO: 12.41 K/UL (ref 3.9–12.7)

## 2020-02-12 PROCEDURE — 99024 POSTOP FOLLOW-UP VISIT: CPT | Mod: ,,, | Performed by: OBSTETRICS & GYNECOLOGY

## 2020-02-12 PROCEDURE — 99024 PR POST-OP FOLLOW-UP VISIT: ICD-10-PCS | Mod: ,,, | Performed by: OBSTETRICS & GYNECOLOGY

## 2020-02-12 PROCEDURE — 63600175 PHARM REV CODE 636 W HCPCS: Performed by: STUDENT IN AN ORGANIZED HEALTH CARE EDUCATION/TRAINING PROGRAM

## 2020-02-12 PROCEDURE — 20600001 HC STEP DOWN PRIVATE ROOM

## 2020-02-12 PROCEDURE — 25000003 PHARM REV CODE 250: Performed by: STUDENT IN AN ORGANIZED HEALTH CARE EDUCATION/TRAINING PROGRAM

## 2020-02-12 PROCEDURE — 85025 COMPLETE CBC W/AUTO DIFF WBC: CPT

## 2020-02-12 PROCEDURE — 97161 PT EVAL LOW COMPLEX 20 MIN: CPT

## 2020-02-12 PROCEDURE — S0028 INJECTION, FAMOTIDINE, 20 MG: HCPCS | Performed by: STUDENT IN AN ORGANIZED HEALTH CARE EDUCATION/TRAINING PROGRAM

## 2020-02-12 PROCEDURE — 97116 GAIT TRAINING THERAPY: CPT

## 2020-02-12 PROCEDURE — 94761 N-INVAS EAR/PLS OXIMETRY MLT: CPT

## 2020-02-12 PROCEDURE — 80048 BASIC METABOLIC PNL TOTAL CA: CPT

## 2020-02-12 RX ORDER — ACETAMINOPHEN 325 MG/1
650 TABLET ORAL EVERY 6 HOURS
Status: DISCONTINUED | OUTPATIENT
Start: 2020-02-12 | End: 2020-02-12

## 2020-02-12 RX ORDER — ACETAMINOPHEN 650 MG/20.3ML
650 LIQUID ORAL EVERY 6 HOURS
Status: DISCONTINUED | OUTPATIENT
Start: 2020-02-12 | End: 2020-02-13

## 2020-02-12 RX ADMIN — FAMOTIDINE 20 MG: 10 INJECTION, SOLUTION INTRAVENOUS at 08:02

## 2020-02-12 RX ADMIN — ZOLPIDEM TARTRATE 5 MG: 5 TABLET, COATED ORAL at 08:02

## 2020-02-12 RX ADMIN — LOSARTAN POTASSIUM 100 MG: 25 TABLET ORAL at 08:02

## 2020-02-12 RX ADMIN — MUPIROCIN 1 G: 20 OINTMENT TOPICAL at 08:02

## 2020-02-12 RX ADMIN — Medication 800 MG: at 05:02

## 2020-02-12 RX ADMIN — TRAMADOL HYDROCHLORIDE 100 MG: 50 TABLET, FILM COATED ORAL at 01:02

## 2020-02-12 RX ADMIN — TRAMADOL HYDROCHLORIDE 100 MG: 50 TABLET, FILM COATED ORAL at 04:02

## 2020-02-12 RX ADMIN — ONDANSETRON 8 MG: 2 INJECTION INTRAMUSCULAR; INTRAVENOUS at 08:02

## 2020-02-12 RX ADMIN — ACETAMINOPHEN 650 MG: 160 SOLUTION ORAL at 06:02

## 2020-02-12 RX ADMIN — PROMETHAZINE HYDROCHLORIDE 12.5 MG: 25 INJECTION INTRAMUSCULAR; INTRAVENOUS at 04:02

## 2020-02-12 RX ADMIN — DOCUSATE SODIUM 100 MG: 100 CAPSULE, LIQUID FILLED ORAL at 08:02

## 2020-02-12 RX ADMIN — SULFAMETHOXAZOLE AND TRIMETHOPRIM 1 TABLET: 400; 80 TABLET ORAL at 08:02

## 2020-02-12 RX ADMIN — IBUPROFEN 800 MG: 800 INJECTION INTRAVENOUS at 01:02

## 2020-02-12 RX ADMIN — IBUPROFEN 800 MG: 800 INJECTION INTRAVENOUS at 09:02

## 2020-02-12 RX ADMIN — ACETAMINOPHEN 650 MG: 160 SOLUTION ORAL at 11:02

## 2020-02-12 RX ADMIN — ENOXAPARIN SODIUM 40 MG: 100 INJECTION SUBCUTANEOUS at 04:02

## 2020-02-12 NOTE — SUBJECTIVE & OBJECTIVE
Interval History: NAEO, VSS. Nauseous with sips of clears, no vomiting. She is ambulating out of bed. Improving pain control. Adequate UOP.     Medications:  Continuous Infusions:   sodium chloride 0.9% 100 mL/hr at 02/11/20 1518     Scheduled Meds:   acetaminophen  650 mg Oral Q6H    docusate sodium  100 mg Oral BID    enoxaparin  40 mg Subcutaneous Daily    famotidine (PF)  20 mg Intravenous Daily    ibuprofen  800 mg Intravenous Q8H    losartan  100 mg Oral Q24H    mupirocin  1 g Nasal BID    scopolamine  1 patch Transdermal Q3 Days    sulfamethoxazole-trimethoprim 400-80mg  1 tablet Oral Q24H     PRN Meds:sodium chloride, acetaminophen, albuterol-ipratropium, hydrALAZINE, morphine, ondansetron, promethazine (PHENERGAN) IVPB, traMADol, traMADol, zolpidem     Review of patient's allergies indicates:   Allergen Reactions    Pcn [penicillins] Other (See Comments)     Patient can't remember the reaction. Reaction occurred over 60 years     Objective:     Vital Signs (Most Recent):  Temp: 98 °F (36.7 °C) (02/12/20 0807)  Pulse: (!) 47 (02/12/20 0807)  Resp: 18 (02/12/20 0807)  BP: (!) 143/67 (02/12/20 0336)  SpO2: (!) 92 % (02/12/20 0807) Vital Signs (24h Range):  Temp:  [98 °F (36.7 °C)-98.9 °F (37.2 °C)] 98 °F (36.7 °C)  Pulse:  [] 47  Resp:  [16-20] 18  SpO2:  [90 %-92 %] 92 %  BP: (126-151)/(58-68) 143/67     Weight: 54.4 kg (120 lb)  Body mass index is 20.6 kg/m².    Intake/Output - Last 3 Shifts       02/10 0700 - 02/11 0659 02/11 0700 - 02/12 0659 02/12 0700 - 02/13 0659    I.V. (mL/kg) 3530 (64.9)      Blood 938      Total Intake(mL/kg) 4468 (82.1)      Urine (mL/kg/hr) 1230 (0.9) 800 (0.6)     Blood 900      Total Output 2130 800     Net +2338 -800            Urine Occurrence  2 x           Physical Exam   Constitutional: She is oriented to person, place, and time. She appears well-developed and well-nourished.   Cardiovascular: Normal rate and intact distal pulses.   Pulmonary/Chest: Effort  normal. No respiratory distress.   Abdominal: Soft. She exhibits no distension. There is no tenderness.   Midline incision tender to palpation, no erythema/induration   Neurological: She is alert and oriented to person, place, and time.   Skin: Skin is warm and dry.   Nursing note and vitals reviewed.      Significant Labs:  CBC:   Recent Labs   Lab 02/12/20 0341   WBC 12.41   RBC 2.61*   HGB 7.7*   HCT 23.3*      MCV 89   MCH 29.5   MCHC 33.0     BMP:   Recent Labs   Lab 02/12/20  0341   GLU 98      K 3.7   *   CO2 21*   BUN 9   CREATININE 0.7   CALCIUM 7.9*     CMP:   Recent Labs   Lab 02/10/20  1254  02/12/20 0341   *   < > 98   CALCIUM 7.7*   < > 7.9*   ALBUMIN 2.7*  --   --    PROT 4.3*  --   --       < > 140   K 3.9   < > 3.7   CO2 21*   < > 21*      < > 111*   BUN 10   < > 9   CREATININE 0.7   < > 0.7   ALKPHOS 55  --   --    ALT 8*  --   --    AST 24  --   --    BILITOT 2.0*  --   --     < > = values in this interval not displayed.       Significant Diagnostics:  I have reviewed all pertinent imaging results/findings within the past 24 hours.

## 2020-02-12 NOTE — PT/OT/SLP PROGRESS
Occupational Therapy      Patient Name:  Salome Moore   MRN:  [id number]    Patient not seen today secondary to pt unavailable to eval.  . Will follow-up next day.    Ladonna Quiles OT  2/12/2020

## 2020-02-12 NOTE — ASSESSMENT & PLAN NOTE
- advanced disease at time of surgery  - now optimally debulked  - will need adjuvant chemotherapy after recovers

## 2020-02-12 NOTE — PROGRESS NOTES
Afternoon rounds completed. Patient states she thinks CLD is exacerbating her nausea. Too salty vs too sweet. Overall pain control ok.  Not requiring PRNs. Still ambulating and voiding independently. Reports +flatus, no BM.    Temp:  [96.2 °F (35.7 °C)-98.9 °F (37.2 °C)] 96.2 °F (35.7 °C)  Pulse:  [] 112  Resp:  [16-20] 18  SpO2:  [90 %-92 %] 92 %  BP: (143-164)/(66-84) 164/84    Gen: A&Ox3, NAD  CV: RRR  Pulm: normal resp effort  Abd: normoactive bowel sounds, soft, non-distended, +tender to palpation without rebound or guarding  Inc: midline vertical c/d/i  Ext: PPP, no peripheral edema, TEDs/SCDs in place    A/P POD2 s/p JENA/BSO/R colectomy/paracaval node resection  - d/w Dr. Arredondo-- ok for full liquids  - saline lock IVFs to encourage ambulation, resume if not tolerating PO or if in bed  - continue current pain regimen  - continue lovenox    Veena Zabala MD  OB/GYN, PGY-4

## 2020-02-12 NOTE — PROGRESS NOTES
[facility]  Gynecologic Oncology  Progress Note      Patient Name: Salome Moore  MRN: [id number]  Admission Date: 2/10/2020  Hospital Length of Stay: 2 days  Attending Provider: Stephany Arredondo MD  Primary Care Provider: Ike Almeida MD  Principal Problem: S/P JENA-BSO    Follow-up For: Procedure(s) (LRB):  HYSTERECTOMY, TOTAL, ABDOMINAL (N/A)  SALPINGO-OOPHORECTOMY, BILATERAL (N/A)  DEBULKING, NEOPLASM (N/A)  LYMPHADENECTOMY, RETROPERITONEUM (Right)  COLECTOMY (Right)  LYMPHADENECTOMY, PELVIS (Right)  CYSTOSCOPY (N/A)  CATHETERIZATION, URETER (Bilateral)  Post-Operative Day: 2 Days Post-Op  Subjective:      History of Present Illness:  Salome Moore is a 84 y.o. who presents as scheduled for debulking surgery for advanced stage endometrial cancer.    Hospital Course:  02/10/2020: Underwent Ex Lap/JENA/BSO/LND for advanced stage endometrial cancer. Planned intra-operative consult with surgical oncology completed for large para-caval lymph node. 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 Right hemicolectomy also per surg onc. Received 3u pRBC intra-op (EBL 900cc). Overall patient tolerated well. See op notes for details.   2/11/2020: Slowly meeting post op milestones. CLD maintained due to nausea. +Voiding, +ambulating. Post op H/h 7.9/24.8.     Interval History:   Doing better this AM. Pain control improving. Tolerating CLD with less nausea, no emesis. Voiding independently and ambulating with some assistance.  Denies flatus/BM.         Scheduled Meds:   acetaminophen  650 mg Oral Q6H    docusate sodium  100 mg Oral BID    enoxaparin  40 mg Subcutaneous Daily    famotidine (PF)  20 mg Intravenous Daily    ibuprofen  800 mg Intravenous Q8H    ibuprofen  800 mg Intravenous Q8H    losartan  100 mg Oral Q24H    mupirocin  1 g Nasal BID    scopolamine  1 patch Transdermal Q3 Days    sulfamethoxazole-trimethoprim 400-80mg  1 tablet  Oral Q24H     Continuous Infusions:   sodium chloride 0.9% 100 mL/hr at 02/11/20 1518     PRN Meds:sodium chloride, acetaminophen, albuterol-ipratropium, hydrALAZINE, morphine, ondansetron, promethazine (PHENERGAN) IVPB, traMADol, traMADol, zolpidem    Review of patient's allergies indicates:   Allergen Reactions    Pcn [penicillins] Other (See Comments)     Patient can't remember the reaction. Reaction occurred over 60 years       Objective:     Vital Signs (Most Recent):  Temp: 98.7 °F (37.1 °C) (02/12/20 0336)  Pulse: 106 (02/12/20 0336)  Resp: 20 (02/12/20 0336)  BP: (!) 143/67 (02/12/20 0336)  SpO2: (!) 91 % (02/12/20 0336) Vital Signs (24h Range):  Temp:  [97.8 °F (36.6 °C)-98.9 °F (37.2 °C)] 98.7 °F (37.1 °C)  Pulse:  [] 106  Resp:  [16-20] 20  SpO2:  [90 %-92 %] 91 %  BP: (126-151)/(58-68) 143/67     Weight: 54.4 kg (120 lb)  Body mass index is 20.6 kg/m².    Intake/Output - Last 3 Shifts       02/10 0700 - 02/11 0659 02/11 0700 - 02/12 0659    I.V. (mL/kg) 3530 (64.9)     Blood 938     Total Intake(mL/kg) 4468 (82.1)     Urine (mL/kg/hr) 1230 (0.9) 800 (0.6)    Blood 900     Total Output 2130 800    Net +2338 -800          Urine Occurrence  2 x             Physical Exam:   Constitutional: She is oriented to person, place, and time. She appears well-developed and well-nourished. No distress.    HENT:   Head: Normocephalic and atraumatic.    Eyes: Conjunctivae and EOM are normal.    Neck: Neck supple.    Cardiovascular: Normal rate and regular rhythm.     Pulmonary/Chest: Effort normal and breath sounds normal. No respiratory distress. She has no wheezes. She has no rales. She exhibits no tenderness.        Abdominal: Soft. Bowel sounds are normal. She exhibits abdominal incision (midline vertical dressing c/d/i). She exhibits no distension. There is no tenderness. There is no rebound and no guarding.             Musculoskeletal: She exhibits no edema or tenderness.   SCDs in place       Neurological:  She is alert and oriented to person, place, and time.    Skin: Skin is warm and dry. No rash noted.    Psychiatric: She has a normal mood and affect.       Lines/Drains/Airways     Peripheral Intravenous Line                 Midline Catheter Insertion/Assessment  - Single Lumen 02/11/20 1510 Right brachial vein 18g x 8cm less than 1 day                Laboratory:  CBC:   Recent Labs   Lab 02/10/20  1254 02/11/20  0430 02/12/20  0341   WBC  --  11.35 12.41   HGB 8.4* 7.9* 7.7*   HCT 26.8* 24.8* 23.3*   PLT  --  270 302       Diagnostic Results:  n/a    Assessment/Plan:     * S/P JENA-BSO/Right colectomy/para-caval node resection Feb 2020  Routine post op care  - Pain control: attempting to minimize narcotics   - sched IV ibuprofen   - sched PO acetaminophen    - oxy 5/10 PRN    - morphine IV PRN breakthrough  - Diet: clear liquid, continue IVFs until tolerating more PO  - PPx: lovenox ppx, SCDs, early ambulation, IS to bedside      Postoperative anemia  - Hg 7.7 this AM  - 1 unit pRBC ordered to be held  - transfuse PRN symptoms    Constipation  - colace BID  - will add additional agents PRN    History of confusion  - Delirium precautions ordered  - Multimodal pain regimen to minimize opioid use  - Patient adamant about having ambien for sleep    Endometrial cancer  - advanced disease at time of surgery  - now optimally debulked  - will need adjuvant chemotherapy after recovers    Essential hypertension  - continue Losartan  - hydralazine 10mg IV ordered PRN SBP> 180      VTE Risk Mitigation (From admission, onward)         Ordered     enoxaparin injection 40 mg  Daily      02/10/20 1241     IP VTE HIGH RISK PATIENT  Once      02/10/20 1241                Was maravilla catheter removed? Yes    Veena Zabala MD  OB/GYN, PGY-4  [phone]

## 2020-02-12 NOTE — PROGRESS NOTES
Pt removed her own HOLLAND stockings. I educated her on why it was important to keep them on and help with swelling and prophylaxis of DVT. Will continue to monitor pain and nausea for pt throughout the night.

## 2020-02-12 NOTE — PROGRESS NOTES
[facility]  General Surgery  Progress Note    Subjective:       Post-Op Info:  Procedure(s) (LRB):  HYSTERECTOMY, TOTAL, ABDOMINAL (N/A)  SALPINGO-OOPHORECTOMY, BILATERAL (N/A)  DEBULKING, NEOPLASM (N/A)  LYMPHADENECTOMY, RETROPERITONEUM (Right)  COLECTOMY (Right)  LYMPHADENECTOMY, PELVIS (Right)  CYSTOSCOPY (N/A)  CATHETERIZATION, URETER (Bilateral)   2 Days Post-Op     Interval History: NAEO, VSS. Nauseous with sips of clears, no vomiting. She is ambulating out of bed. Improving pain control. Adequate UOP.     Medications:  Continuous Infusions:   sodium chloride 0.9% 100 mL/hr at 02/11/20 1518     Scheduled Meds:   acetaminophen  650 mg Oral Q6H    docusate sodium  100 mg Oral BID    enoxaparin  40 mg Subcutaneous Daily    famotidine (PF)  20 mg Intravenous Daily    ibuprofen  800 mg Intravenous Q8H    losartan  100 mg Oral Q24H    mupirocin  1 g Nasal BID    scopolamine  1 patch Transdermal Q3 Days    sulfamethoxazole-trimethoprim 400-80mg  1 tablet Oral Q24H     PRN Meds:sodium chloride, acetaminophen, albuterol-ipratropium, hydrALAZINE, morphine, ondansetron, promethazine (PHENERGAN) IVPB, traMADol, traMADol, zolpidem     Review of patient's allergies indicates:   Allergen Reactions    Pcn [penicillins] Other (See Comments)     Patient can't remember the reaction. Reaction occurred over 60 years     Objective:     Vital Signs (Most Recent):  Temp: 98 °F (36.7 °C) (02/12/20 0807)  Pulse: (!) 47 (02/12/20 0807)  Resp: 18 (02/12/20 0807)  BP: (!) 143/67 (02/12/20 0336)  SpO2: (!) 92 % (02/12/20 0807) Vital Signs (24h Range):  Temp:  [98 °F (36.7 °C)-98.9 °F (37.2 °C)] 98 °F (36.7 °C)  Pulse:  [] 47  Resp:  [16-20] 18  SpO2:  [90 %-92 %] 92 %  BP: (126-151)/(58-68) 143/67     Weight: 54.4 kg (120 lb)  Body mass index is 20.6 kg/m².    Intake/Output - Last 3 Shifts       02/10 0700 - 02/11 0659 02/11 0700 - 02/12 0659 02/12 0700 - 02/13 0659    I.V. (mL/kg) 3530 (64.9)       Blood 938      Total Intake(mL/kg) 4468 (82.1)      Urine (mL/kg/hr) 1230 (0.9) 800 (0.6)     Blood 900      Total Output 2130 800     Net +2338 -800            Urine Occurrence  2 x           Physical Exam   Constitutional: She is oriented to person, place, and time. She appears well-developed and well-nourished.   Cardiovascular: Normal rate and intact distal pulses.   Pulmonary/Chest: Effort normal. No respiratory distress.   Abdominal: Soft. She exhibits no distension. There is no tenderness.   Midline incision tender to palpation, no erythema/induration   Neurological: She is alert and oriented to person, place, and time.   Skin: Skin is warm and dry.   Nursing note and vitals reviewed.      Significant Labs:  CBC:   Recent Labs   Lab 02/12/20  0341   WBC 12.41   RBC 2.61*   HGB 7.7*   HCT 23.3*      MCV 89   MCH 29.5   MCHC 33.0     BMP:   Recent Labs   Lab 02/12/20  0341   GLU 98      K 3.7   *   CO2 21*   BUN 9   CREATININE 0.7   CALCIUM 7.9*     CMP:   Recent Labs   Lab 02/10/20  1254  02/12/20  0341   *   < > 98   CALCIUM 7.7*   < > 7.9*   ALBUMIN 2.7*  --   --    PROT 4.3*  --   --       < > 140   K 3.9   < > 3.7   CO2 21*   < > 21*      < > 111*   BUN 10   < > 9   CREATININE 0.7   < > 0.7   ALKPHOS 55  --   --    ALT 8*  --   --    AST 24  --   --    BILITOT 2.0*  --   --     < > = values in this interval not displayed.       Significant Diagnostics:  I have reviewed all pertinent imaging results/findings within the past 24 hours.    Assessment/Plan:     * S/P JENA-BSO/Right colectomy/para-caval node resection Feb 2020  83y/o female now POD 1 from JENA-BSO/right colectomy/para-caval node resection. Nausea with CLD.     -Continue CLD with small sips  - H/H stable  - OOBTC and ambulation as tolerated  -Nausea meds prn   -Rest of care per primary team  -Will follow-up pathology  -Will continue to follow       Mira Valdes MD  General Surgery  [facility]  [place]

## 2020-02-12 NOTE — ASSESSMENT & PLAN NOTE
Routine post op care  - Pain control: attempting to minimize narcotics   - sched IV ibuprofen   - sched PO acetaminophen    - oxy 5/10 PRN    - morphine IV PRN breakthrough  - Diet: clear liquid, continue IVFs until tolerating more PO  - PPx: lovenox ppx, SCDs, early ambulation, IS to bedside

## 2020-02-12 NOTE — PLAN OF CARE
Reviewed plan of care with pt at the beginning of the shift. Pt reported pain and n/v throughout the shift. Vital signs were stable throughout the shift. Fall precautions continued for pt. Bed locked and at lowest position. Call light within reach. Pt knows to call if assistance is needed.

## 2020-02-12 NOTE — ASSESSMENT & PLAN NOTE
85y/o female now POD 1 from JENA-BSO/right colectomy/para-caval node resection. She tolerated the procedure well. Received 3 units of pRBCs in the OR. Having some pain control issue and nausea/emesis. Nausea with CLD.     -Continue CLD with small sips  - H/H stable  - OOBTC and ambulation as tolerated  -Nausea meds prn   -Rest of care per primary team  -Will follow-up pathology  -Will continue to follow

## 2020-02-12 NOTE — PT/OT/SLP EVAL
"Physical Therapy Evaluation    Patient Name:  Salome Moore   MRN:  [id number]    Recommendations:     Discharge Recommendations:  home   Discharge Equipment Recommendations: shower chair(pending progress)   Barriers to discharge: Decreased caregiver support    Assessment:     Salome Moore is a 84 y.o. female admitted with a medical diagnosis of S/P JENA-BSO.  She presents with the following impairments/functional limitations:  impaired endurance, impaired self care skills, gait instability, pain, impaired skin, impaired balance . Pt appeared anxious and c/o wanting her SCD's off and IV removed, stating that her doctor said she did not need them. Pt motivated to walk today. Pt was able to perform bed mobility with mod I, transfers with CGA- Amrit, and ambulation with SBA/CGA. Pt ambulated for 2 trials of 200' with SBA/CGA and no AD with seated rest break in between. Pt would benefit from 1-2 more visits in order to maximize safety with mobility.    Rehab Prognosis: Good; patient would benefit from acute skilled PT services to address these deficits and reach maximum level of function.    Recent Surgery: Procedure(s) (LRB):  HYSTERECTOMY, TOTAL, ABDOMINAL (N/A)  SALPINGO-OOPHORECTOMY, BILATERAL (N/A)  DEBULKING, NEOPLASM (N/A)  LYMPHADENECTOMY, RETROPERITONEUM (Right)  COLECTOMY (Right)  LYMPHADENECTOMY, PELVIS (Right)  CYSTOSCOPY (N/A)  CATHETERIZATION, URETER (Bilateral) 2 Days Post-Op    Plan:     During this hospitalization, patient to be seen 2 x/week to address the identified rehab impairments via gait training, therapeutic activities, therapeutic exercises, neuromuscular re-education and progress toward the following goals:    · Plan of Care Expires:  03/13/20    Subjective     Chief Complaint: "I want this out" (referring to her IV)  Patient/Family Comments/goals: return home to PLOF  Pain/Comfort:  · Pain Rating 1: other (see comments)(did not rate)  · Location - Orientation 1: " medial  · Location 1: abdomen(from incision, per pt)  · Pain Addressed 1: Distraction, Reposition, Cessation of Activity    Patients cultural, spiritual, Christianity conflicts given the current situation:      Living Environment:  Pt lives alone in a first floor apartment with 0 SHIRA. Pt has a tub/shower combo and reports she sits in the tub to bathe and does not stand.  Prior to admission, patients level of function was independent.  Equipment used at home: none.  DME owned (not currently used): none.  Upon discharge, patient will have assistance from family who is coming in town, but she does not want to accept help.    Objective:     Communicated with nurse prior to session.  Patient found HOB elevated with peripheral IV, SCD  upon PT entry to room.    General Precautions: Standard, fall   Orthopedic Precautions:N/A   Braces: N/A     Exams:  · Cognitive Exam:  Patient is oriented to Person, Place, Time and Situation  · Gross Motor Coordination:  WFL  · Sensation:    · -       Intact  light/touch BLE  · RLE ROM: WFL  · RLE Strength: WFL except report pain at incision with resistance  · LLE ROM: WFL  · LLE Strength: WFL except reports pain at incision with resistance    Functional Mobility:  · Bed Mobility:     · Supine to Sit: modified independence  · Sit to Supine: modified independence  · Transfers:     · Sit to Stand:  contact guard assistance and minimum assistance with hand-held assist; 3 trials, 1 from EOB with CGA, 1 from bench in hallway with min A via HHA, 1 from EOB with min A via HHA  · Bed to Chair: contact guard assistance with  hand-held assist  using  Step Transfer  · Gait: Pt ambulated for 2 trials of 200' with SBA/CGA and no AD. Pt ambulates with decreased step height, decreased step length, and narrow DEB. Pt had occasional mild LOB but was able to self-correct. Seated rest break between trials d/t c/o SOB.  · Balance: Static and dynamic sit: good, static stand: good, dynamic stand:  good-      Therapeutic Activities and Exercises:   Pt sat at EOB for 5' and on bench in hallway for 5' with SBA and no UE support.    Pt educated on PT role/POC, benefits of OOB activity, request assistance for transfers and ambulation. Pt verbalized understanding.    AM-PAC 6 CLICK MOBILITY  Total Score:21     Patient left up in chair with all lines intact, call button in reach and nurse present.    GOALS:   Multidisciplinary Problems     Physical Therapy Goals        Problem: Physical Therapy Goal    Goal Priority Disciplines Outcome Goal Variances Interventions   Physical Therapy Goal     PT, PT/OT Ongoing, Progressing     Description:  Goals to be met by: 2020     Patient will increase functional independence with mobility by performin. Sit to stand transfer with Modified [place]  2. Bed to chair transfer with Supervision using LRAD  3. Gait  x 400 feet with Supervision using LRAD.                       History:     Past Medical History:   Diagnosis Date    Basal cell carcinoma     Cataract     Cystitis     Glaucoma     Hypertension        Past Surgical History:   Procedure Laterality Date    BILATERAL SALPINGO-OOPHORECTOMY (BSO) N/A 2/10/2020    Procedure: SALPINGO-OOPHORECTOMY, BILATERAL;  Surgeon: Stephany Arredondo MD;  Location: [address];  Service: OB/GYN;  Laterality: N/A;    BREAST SURGERY      augmentation    CATARACT EXTRACTION W/  INTRAOCULAR LENS IMPLANT Bilateral     CATHETERIZATION OF URETER Bilateral 2/10/2020    Procedure: CATHETERIZATION, URETER;  Surgeon: Taj Taylor MD;  Location: [address];  Service: Urology;  Laterality: Bilateral;    COLECTOMY Right 2/10/2020    Procedure: COLECTOMY;  Surgeon: Hiren Mckeon MD;  Location: [address];  Service: General;  Laterality: Right;    CYSTOSCOPY N/A 2/10/2020    Procedure: CYSTOSCOPY;  Surgeon: Taj Taylor MD;  Location: [address];  Service: Urology;  Laterality: N/A;    DEBULKING OF  TUMOR N/A 2/10/2020    Procedure: DEBULKING, NEOPLASM;  Surgeon: Stephany Arredondo MD;  Location: [facility] OR [address] [place] [address];  Service: OB/GYN;  Laterality: N/A;    EYE SURGERY      RETROPERITONEAL LYMPHADENECTOMY Right 2/10/2020    Procedure: LYMPHADENECTOMY, RETROPERITONEUM;  Surgeon: Hiren Mckeon MD;  Location: [facility] OR [address] [place] [address];  Service: General;  Laterality: Right;    TOTAL ABDOMINAL HYSTERECTOMY N/A 2/10/2020    Procedure: HYSTERECTOMY, TOTAL, ABDOMINAL;  Surgeon: Stephany Arredondo MD;  Location: [facility] OR [address] [place] [address];  Service: OB/GYN;  Laterality: N/A;    VAGINAL DELIVERY         Time Tracking:     PT Received On: 02/12/20  PT Start Time: 1426     PT Stop Time: 1451  PT Total Time (min): 25 min     Billable Minutes: Evaluation 10 and Gait Training 15      ROSENDO Rashid  02/12/2020

## 2020-02-12 NOTE — PLAN OF CARE
Problem: Physical Therapy Goal  Goal: Physical Therapy Goal  Description  Goals to be met by: 2020     Patient will increase functional independence with mobility by performin. Sit to stand transfer with Modified [place]  2. Bed to chair transfer with Supervision using LRAD  3. Gait  x 400 feet with Supervision using LRAD.      Outcome: Ongoing, Progressing   Pt evaluated and appropriate goals established. Altagracia Nunez, SPT

## 2020-02-12 NOTE — SUBJECTIVE & OBJECTIVE
Interval History:   Doing better this AM. Pain control improving. Tolerating CLD with less nausea, no emesis. Voiding independently and ambulating with some assistance.  Denies flatus/BM.         Scheduled Meds:   acetaminophen  650 mg Oral Q6H    docusate sodium  100 mg Oral BID    enoxaparin  40 mg Subcutaneous Daily    famotidine (PF)  20 mg Intravenous Daily    ibuprofen  800 mg Intravenous Q8H    ibuprofen  800 mg Intravenous Q8H    losartan  100 mg Oral Q24H    mupirocin  1 g Nasal BID    scopolamine  1 patch Transdermal Q3 Days    sulfamethoxazole-trimethoprim 400-80mg  1 tablet Oral Q24H     Continuous Infusions:   sodium chloride 0.9% 100 mL/hr at 02/11/20 1518     PRN Meds:sodium chloride, acetaminophen, albuterol-ipratropium, hydrALAZINE, morphine, ondansetron, promethazine (PHENERGAN) IVPB, traMADol, traMADol, zolpidem    Review of patient's allergies indicates:   Allergen Reactions    Pcn [penicillins] Other (See Comments)     Patient can't remember the reaction. Reaction occurred over 60 years       Objective:     Vital Signs (Most Recent):  Temp: 98.7 °F (37.1 °C) (02/12/20 0336)  Pulse: 106 (02/12/20 0336)  Resp: 20 (02/12/20 0336)  BP: (!) 143/67 (02/12/20 0336)  SpO2: (!) 91 % (02/12/20 0336) Vital Signs (24h Range):  Temp:  [97.8 °F (36.6 °C)-98.9 °F (37.2 °C)] 98.7 °F (37.1 °C)  Pulse:  [] 106  Resp:  [16-20] 20  SpO2:  [90 %-92 %] 91 %  BP: (126-151)/(58-68) 143/67     Weight: 54.4 kg (120 lb)  Body mass index is 20.6 kg/m².    Intake/Output - Last 3 Shifts       02/10 0700 - 02/11 0659 02/11 0700 - 02/12 0659    I.V. (mL/kg) 3530 (64.9)     Blood 938     Total Intake(mL/kg) 4468 (82.1)     Urine (mL/kg/hr) 1230 (0.9) 800 (0.6)    Blood 900     Total Output 2130 800    Net +2338 -800          Urine Occurrence  2 x             Physical Exam:   Constitutional: She is oriented to person, place, and time. She appears well-developed and well-nourished. No distress.    HENT:   Head:  Normocephalic and atraumatic.    Eyes: Conjunctivae and EOM are normal.    Neck: Neck supple.    Cardiovascular: Normal rate and regular rhythm.     Pulmonary/Chest: Effort normal and breath sounds normal. No respiratory distress. She has no wheezes. She has no rales. She exhibits no tenderness.        Abdominal: Soft. Bowel sounds are normal. She exhibits abdominal incision (midline vertical dressing c/d/i). She exhibits no distension. There is no tenderness. There is no rebound and no guarding.             Musculoskeletal: She exhibits no edema or tenderness.   SCDs in place       Neurological: She is alert and oriented to person, place, and time.    Skin: Skin is warm and dry. No rash noted.    Psychiatric: She has a normal mood and affect.       Lines/Drains/Airways     Peripheral Intravenous Line                 Midline Catheter Insertion/Assessment  - Single Lumen 02/11/20 1510 Right brachial vein 18g x 8cm less than 1 day                Laboratory:  CBC:   Recent Labs   Lab 02/10/20  1254 02/11/20  0430 02/12/20  0341   WBC  --  11.35 12.41   HGB 8.4* 7.9* 7.7*   HCT 26.8* 24.8* 23.3*   PLT  --  270 302       Diagnostic Results:  n/a

## 2020-02-13 LAB
ALBUMIN SERPL BCP-MCNC: 2.9 G/DL (ref 3.5–5.2)
ALP SERPL-CCNC: 99 U/L (ref 55–135)
ALT SERPL W/O P-5'-P-CCNC: 24 U/L (ref 10–44)
ANION GAP SERPL CALC-SCNC: 7 MMOL/L (ref 8–16)
ANION GAP SERPL CALC-SCNC: 9 MMOL/L (ref 8–16)
AST SERPL-CCNC: 48 U/L (ref 10–40)
BASOPHILS # BLD AUTO: 0.06 K/UL (ref 0–0.2)
BASOPHILS # BLD AUTO: 0.09 K/UL (ref 0–0.2)
BASOPHILS NFR BLD: 0.5 % (ref 0–1.9)
BASOPHILS NFR BLD: 0.9 % (ref 0–1.9)
BILIRUB SERPL-MCNC: 0.5 MG/DL (ref 0.1–1)
BUN SERPL-MCNC: 7 MG/DL (ref 8–23)
BUN SERPL-MCNC: 7 MG/DL (ref 8–23)
CALCIUM SERPL-MCNC: 7.9 MG/DL (ref 8.7–10.5)
CALCIUM SERPL-MCNC: 8.4 MG/DL (ref 8.7–10.5)
CHLORIDE SERPL-SCNC: 110 MMOL/L (ref 95–110)
CHLORIDE SERPL-SCNC: 112 MMOL/L (ref 95–110)
CO2 SERPL-SCNC: 19 MMOL/L (ref 23–29)
CO2 SERPL-SCNC: 21 MMOL/L (ref 23–29)
CREAT SERPL-MCNC: 0.7 MG/DL (ref 0.5–1.4)
CREAT SERPL-MCNC: 0.7 MG/DL (ref 0.5–1.4)
DIFFERENTIAL METHOD: ABNORMAL
DIFFERENTIAL METHOD: ABNORMAL
EOSINOPHIL # BLD AUTO: 0.4 K/UL (ref 0–0.5)
EOSINOPHIL # BLD AUTO: 0.8 K/UL (ref 0–0.5)
EOSINOPHIL NFR BLD: 3.2 % (ref 0–8)
EOSINOPHIL NFR BLD: 7.8 % (ref 0–8)
ERYTHROCYTE [DISTWIDTH] IN BLOOD BY AUTOMATED COUNT: 14.6 % (ref 11.5–14.5)
ERYTHROCYTE [DISTWIDTH] IN BLOOD BY AUTOMATED COUNT: 14.8 % (ref 11.5–14.5)
EST. GFR  (AFRICAN AMERICAN): >60 ML/MIN/1.73 M^2
EST. GFR  (AFRICAN AMERICAN): >60 ML/MIN/1.73 M^2
EST. GFR  (NON AFRICAN AMERICAN): >60 ML/MIN/1.73 M^2
EST. GFR  (NON AFRICAN AMERICAN): >60 ML/MIN/1.73 M^2
GLUCOSE SERPL-MCNC: 110 MG/DL (ref 70–110)
GLUCOSE SERPL-MCNC: 91 MG/DL (ref 70–110)
HCT VFR BLD AUTO: 23.9 % (ref 37–48.5)
HCT VFR BLD AUTO: 27.3 % (ref 37–48.5)
HGB BLD-MCNC: 7.4 G/DL (ref 12–16)
HGB BLD-MCNC: 8.7 G/DL (ref 12–16)
IMM GRANULOCYTES # BLD AUTO: 0.04 K/UL (ref 0–0.04)
IMM GRANULOCYTES # BLD AUTO: 0.07 K/UL (ref 0–0.04)
IMM GRANULOCYTES NFR BLD AUTO: 0.3 % (ref 0–0.5)
IMM GRANULOCYTES NFR BLD AUTO: 0.7 % (ref 0–0.5)
LYMPHOCYTES # BLD AUTO: 1.6 K/UL (ref 1–4.8)
LYMPHOCYTES # BLD AUTO: 2.5 K/UL (ref 1–4.8)
LYMPHOCYTES NFR BLD: 16.2 % (ref 18–48)
LYMPHOCYTES NFR BLD: 21 % (ref 18–48)
MAGNESIUM SERPL-MCNC: 1.7 MG/DL (ref 1.6–2.6)
MCH RBC QN AUTO: 28.5 PG (ref 27–31)
MCH RBC QN AUTO: 29 PG (ref 27–31)
MCHC RBC AUTO-ENTMCNC: 31 G/DL (ref 32–36)
MCHC RBC AUTO-ENTMCNC: 31.9 G/DL (ref 32–36)
MCV RBC AUTO: 91 FL (ref 82–98)
MCV RBC AUTO: 92 FL (ref 82–98)
MONOCYTES # BLD AUTO: 0.8 K/UL (ref 0.3–1)
MONOCYTES # BLD AUTO: 0.8 K/UL (ref 0.3–1)
MONOCYTES NFR BLD: 6.8 % (ref 4–15)
MONOCYTES NFR BLD: 8.1 % (ref 4–15)
NEUTROPHILS # BLD AUTO: 6.7 K/UL (ref 1.8–7.7)
NEUTROPHILS # BLD AUTO: 8 K/UL (ref 1.8–7.7)
NEUTROPHILS NFR BLD: 66.3 % (ref 38–73)
NEUTROPHILS NFR BLD: 68.2 % (ref 38–73)
NRBC BLD-RTO: 0 /100 WBC
NRBC BLD-RTO: 0 /100 WBC
PHOSPHATE SERPL-MCNC: 2.7 MG/DL (ref 2.7–4.5)
PLATELET # BLD AUTO: 324 K/UL (ref 150–350)
PLATELET # BLD AUTO: 439 K/UL (ref 150–350)
PMV BLD AUTO: 8.9 FL (ref 9.2–12.9)
PMV BLD AUTO: 9.3 FL (ref 9.2–12.9)
POTASSIUM SERPL-SCNC: 3.2 MMOL/L (ref 3.5–5.1)
POTASSIUM SERPL-SCNC: 3.4 MMOL/L (ref 3.5–5.1)
PROT SERPL-MCNC: 5.5 G/DL (ref 6–8.4)
RBC # BLD AUTO: 2.6 M/UL (ref 4–5.4)
RBC # BLD AUTO: 3 M/UL (ref 4–5.4)
SODIUM SERPL-SCNC: 138 MMOL/L (ref 136–145)
SODIUM SERPL-SCNC: 140 MMOL/L (ref 136–145)
TROPONIN I SERPL DL<=0.01 NG/ML-MCNC: 0.02 NG/ML (ref 0–0.03)
WBC # BLD AUTO: 10.06 K/UL (ref 3.9–12.7)
WBC # BLD AUTO: 11.8 K/UL (ref 3.9–12.7)

## 2020-02-13 PROCEDURE — 93005 ELECTROCARDIOGRAM TRACING: CPT

## 2020-02-13 PROCEDURE — 80048 BASIC METABOLIC PNL TOTAL CA: CPT

## 2020-02-13 PROCEDURE — 84100 ASSAY OF PHOSPHORUS: CPT

## 2020-02-13 PROCEDURE — 25000003 PHARM REV CODE 250: Performed by: OBSTETRICS & GYNECOLOGY

## 2020-02-13 PROCEDURE — 83735 ASSAY OF MAGNESIUM: CPT

## 2020-02-13 PROCEDURE — 99024 PR POST-OP FOLLOW-UP VISIT: ICD-10-PCS | Mod: ,,, | Performed by: OBSTETRICS & GYNECOLOGY

## 2020-02-13 PROCEDURE — 25000003 PHARM REV CODE 250: Performed by: STUDENT IN AN ORGANIZED HEALTH CARE EDUCATION/TRAINING PROGRAM

## 2020-02-13 PROCEDURE — 63600175 PHARM REV CODE 636 W HCPCS: Performed by: STUDENT IN AN ORGANIZED HEALTH CARE EDUCATION/TRAINING PROGRAM

## 2020-02-13 PROCEDURE — 80053 COMPREHEN METABOLIC PANEL: CPT

## 2020-02-13 PROCEDURE — 97165 OT EVAL LOW COMPLEX 30 MIN: CPT

## 2020-02-13 PROCEDURE — 84484 ASSAY OF TROPONIN QUANT: CPT

## 2020-02-13 PROCEDURE — 85025 COMPLETE CBC W/AUTO DIFF WBC: CPT | Mod: 91

## 2020-02-13 PROCEDURE — 93010 EKG 12-LEAD: ICD-10-PCS | Mod: ,,, | Performed by: INTERNAL MEDICINE

## 2020-02-13 PROCEDURE — 93010 ELECTROCARDIOGRAM REPORT: CPT | Mod: ,,, | Performed by: INTERNAL MEDICINE

## 2020-02-13 PROCEDURE — 20600001 HC STEP DOWN PRIVATE ROOM

## 2020-02-13 PROCEDURE — 99024 POSTOP FOLLOW-UP VISIT: CPT | Mod: ,,, | Performed by: OBSTETRICS & GYNECOLOGY

## 2020-02-13 RX ORDER — IBUPROFEN 400 MG/1
800 TABLET ORAL EVERY 8 HOURS
Status: CANCELLED | OUTPATIENT
Start: 2020-02-13

## 2020-02-13 RX ORDER — FAMOTIDINE 20 MG/1
20 TABLET, FILM COATED ORAL 2 TIMES DAILY
Status: DISCONTINUED | OUTPATIENT
Start: 2020-02-13 | End: 2020-02-17 | Stop reason: HOSPADM

## 2020-02-13 RX ORDER — ACETAMINOPHEN 325 MG/1
650 TABLET ORAL EVERY 6 HOURS PRN
Status: DISCONTINUED | OUTPATIENT
Start: 2020-02-13 | End: 2020-02-17 | Stop reason: HOSPADM

## 2020-02-13 RX ORDER — GUAIFENESIN 100 MG/5ML
200 SOLUTION ORAL EVERY 4 HOURS PRN
Status: DISCONTINUED | OUTPATIENT
Start: 2020-02-13 | End: 2020-02-17 | Stop reason: HOSPADM

## 2020-02-13 RX ORDER — DEXTROSE MONOHYDRATE, SODIUM CHLORIDE, AND POTASSIUM CHLORIDE 50; 1.49; 4.5 G/1000ML; G/1000ML; G/1000ML
INJECTION, SOLUTION INTRAVENOUS CONTINUOUS
Status: DISCONTINUED | OUTPATIENT
Start: 2020-02-13 | End: 2020-02-17 | Stop reason: HOSPADM

## 2020-02-13 RX ORDER — TRAMADOL HYDROCHLORIDE 50 MG/1
50 TABLET ORAL EVERY 6 HOURS PRN
Qty: 30 TABLET | Refills: 0 | Status: SHIPPED | OUTPATIENT
Start: 2020-02-13

## 2020-02-13 RX ORDER — BENZONATATE 100 MG/1
100 CAPSULE ORAL 3 TIMES DAILY PRN
Status: DISCONTINUED | OUTPATIENT
Start: 2020-02-13 | End: 2020-02-17 | Stop reason: HOSPADM

## 2020-02-13 RX ADMIN — FAMOTIDINE 20 MG: 20 TABLET ORAL at 08:02

## 2020-02-13 RX ADMIN — MUPIROCIN 1 G: 20 OINTMENT TOPICAL at 09:02

## 2020-02-13 RX ADMIN — MUPIROCIN 1 G: 20 OINTMENT TOPICAL at 08:02

## 2020-02-13 RX ADMIN — HYDRALAZINE HYDROCHLORIDE 10 MG: 20 INJECTION INTRAMUSCULAR; INTRAVENOUS at 12:02

## 2020-02-13 RX ADMIN — TRAMADOL HYDROCHLORIDE 50 MG: 50 TABLET, FILM COATED ORAL at 01:02

## 2020-02-13 RX ADMIN — BENZONATATE 100 MG: 100 CAPSULE ORAL at 06:02

## 2020-02-13 RX ADMIN — POTASSIUM CHLORIDE, DEXTROSE MONOHYDRATE AND SODIUM CHLORIDE: 150; 5; 450 INJECTION, SOLUTION INTRAVENOUS at 05:02

## 2020-02-13 RX ADMIN — ENOXAPARIN SODIUM 40 MG: 100 INJECTION SUBCUTANEOUS at 05:02

## 2020-02-13 RX ADMIN — POTASSIUM BICARBONATE 25 MEQ: 978 TABLET, EFFERVESCENT ORAL at 08:02

## 2020-02-13 RX ADMIN — IBUPROFEN 800 MG: 800 INJECTION INTRAVENOUS at 05:02

## 2020-02-13 RX ADMIN — LOSARTAN POTASSIUM 100 MG: 25 TABLET ORAL at 08:02

## 2020-02-13 RX ADMIN — SULFAMETHOXAZOLE AND TRIMETHOPRIM 1 TABLET: 400; 80 TABLET ORAL at 08:02

## 2020-02-13 RX ADMIN — TRAMADOL HYDROCHLORIDE 100 MG: 50 TABLET, FILM COATED ORAL at 08:02

## 2020-02-13 RX ADMIN — PROMETHAZINE HYDROCHLORIDE 12.5 MG: 25 INJECTION INTRAMUSCULAR; INTRAVENOUS at 10:02

## 2020-02-13 RX ADMIN — ONDANSETRON 8 MG: 2 INJECTION INTRAMUSCULAR; INTRAVENOUS at 06:02

## 2020-02-13 RX ADMIN — DOCUSATE SODIUM 100 MG: 100 CAPSULE, LIQUID FILLED ORAL at 08:02

## 2020-02-13 RX ADMIN — ACETAMINOPHEN 650 MG: 160 SOLUTION ORAL at 05:02

## 2020-02-13 RX ADMIN — TRAMADOL HYDROCHLORIDE 100 MG: 50 TABLET, FILM COATED ORAL at 03:02

## 2020-02-13 RX ADMIN — SCOPALAMINE 1 PATCH: 1 PATCH, EXTENDED RELEASE TRANSDERMAL at 06:02

## 2020-02-13 NOTE — ASSESSMENT & PLAN NOTE
- H/h stable 7/23  - 1 unit pRBC ordered to be held  - transfuse PRN symptoms (remains asymptomatic)

## 2020-02-13 NOTE — PLAN OF CARE
Reviewed plan of care with pt at the beginning of the shift. Pt reported no pain or n/v throughout the shift. Vital signs were stable throughout the shift. Fall precautions continued for pt. Bed locked and at lowest position. Call light within reach. Pt knows to call if assistance is needed. Pt up to the bathroom independently. Pt reported BM on 02/12/2020.

## 2020-02-13 NOTE — SUBJECTIVE & OBJECTIVE
Interval History:   Doing well. Slept better last night. Has decent pain control. Ate ice cream last night with no nausea. States is PO hydrating.   Voiding independently and ambulating with some assistance.  +Flatus, +BM (diarrhea).        Scheduled Meds:   acetaminophen  650 mg Oral Q6H    docusate sodium  100 mg Oral BID    enoxaparin  40 mg Subcutaneous Daily    famotidine (PF)  20 mg Intravenous Daily    losartan  100 mg Oral Q24H    mupirocin  1 g Nasal BID    scopolamine  1 patch Transdermal Q3 Days    sulfamethoxazole-trimethoprim 400-80mg  1 tablet Oral Q24H     Continuous Infusions:   sodium chloride 0.9% 100 mL/hr at 02/11/20 1518     PRN Meds:sodium chloride, acetaminophen, albuterol-ipratropium, hydrALAZINE, morphine, ondansetron, promethazine (PHENERGAN) IVPB, traMADol, traMADol, zolpidem    Review of patient's allergies indicates:   Allergen Reactions    Pcn [penicillins] Other (See Comments)     Patient can't remember the reaction. Reaction occurred over 60 years       Objective:     Vital Signs (Most Recent):  Temp: 98.5 °F (36.9 °C) (02/13/20 0332)  Pulse: 98 (02/13/20 0332)  Resp: 16 (02/13/20 0332)  BP: (!) 163/73 (02/13/20 0332)  SpO2: 95 % (02/13/20 0332) Vital Signs (24h Range):  Temp:  [96.2 °F (35.7 °C)-98.5 °F (36.9 °C)] 98.5 °F (36.9 °C)  Pulse:  [] 98  Resp:  [16-18] 16  SpO2:  [92 %-99 %] 95 %  BP: (138-164)/(67-84) 163/73     Weight: 54.4 kg (120 lb)  Body mass index is 20.6 kg/m².    Intake/Output - Last 3 Shifts       02/11 0700 - 02/12 0659 02/12 0700 - 02/13 0659    P.O.  180    Total Intake(mL/kg)  180 (3.3)    Urine (mL/kg/hr) 800 (0.6)     Total Output 800     Net -800 +180          Urine Occurrence 2 x 10 x    Stool Occurrence  1 x             Physical Exam:   Constitutional: She is oriented to person, place, and time. She appears well-developed and well-nourished. No distress.    HENT:   Head: Normocephalic and atraumatic.    Eyes: Conjunctivae and EOM are  normal.    Neck: Neck supple.    Cardiovascular: Normal rate and regular rhythm.     Pulmonary/Chest: Effort normal and breath sounds normal. No respiratory distress. She has no wheezes. She has no rales. She exhibits no tenderness.        Abdominal: Soft. She exhibits distension (mild) and abdominal incision (midline vertical c/d/i). There is tenderness (approp). There is no rebound and no guarding.   BS present, hypoactive             Musculoskeletal: She exhibits no edema or tenderness.   SCDs in place       Neurological: She is alert and oriented to person, place, and time.    Skin: Skin is warm and dry. No rash noted.    Psychiatric: She has a normal mood and affect.       Lines/Drains/Airways     Peripheral Intravenous Line                 Midline Catheter Insertion/Assessment  - Single Lumen 02/11/20 1510 Right brachial vein 18g x 8cm 1 day                Laboratory:  CBC:   Recent Labs   Lab 02/12/20  0341 02/13/20  0335   WBC 12.41 10.06   HGB 7.7* 7.4*   HCT 23.3* 23.9*    324       Diagnostic Results:  n/a

## 2020-02-13 NOTE — SUBJECTIVE & OBJECTIVE
Interval History: No acute events overnight. Had a BM yesterday. Tolerating full liquid diet. She states she became nauseous this morning with liquid tylenol. Feels more distended this morning.     Medications:  Continuous Infusions:  Scheduled Meds:   docusate sodium  100 mg Oral BID    enoxaparin  40 mg Subcutaneous Daily    famotidine  20 mg Oral BID    losartan  100 mg Oral Q24H    mupirocin  1 g Nasal BID    scopolamine  1 patch Transdermal Q3 Days    sulfamethoxazole-trimethoprim 400-80mg  1 tablet Oral Q24H     PRN Meds:acetaminophen, albuterol-ipratropium, hydrALAZINE, morphine, ondansetron, promethazine (PHENERGAN) IVPB, traMADol, traMADol, zolpidem     Review of patient's allergies indicates:   Allergen Reactions    Pcn [penicillins] Other (See Comments)     Patient can't remember the reaction. Reaction occurred over 60 years     Objective:     Vital Signs (Most Recent):  Temp: 98.5 °F (36.9 °C) (02/13/20 0332)  Pulse: 98 (02/13/20 0332)  Resp: 16 (02/13/20 0332)  BP: (!) 163/73 (02/13/20 0332)  SpO2: 95 % (02/13/20 0332) Vital Signs (24h Range):  Temp:  [96.2 °F (35.7 °C)-98.5 °F (36.9 °C)] 98.5 °F (36.9 °C)  Pulse:  [] 98  Resp:  [16-18] 16  SpO2:  [92 %-99 %] 95 %  BP: (138-164)/(67-84) 163/73     Weight: 54.4 kg (120 lb)  Body mass index is 20.6 kg/m².    Intake/Output - Last 3 Shifts       02/11 0700 - 02/12 0659 02/12 0700 - 02/13 0659 02/13 0700 - 02/14 0659    P.O.  180     I.V. (mL/kg)       Blood       Total Intake(mL/kg)  180 (3.3)     Urine (mL/kg/hr) 800 (0.6)      Blood       Total Output 800      Net -800 +180            Urine Occurrence 2 x 11 x     Stool Occurrence  2 x           Physical Exam   Constitutional: She is oriented to person, place, and time. She appears well-developed and well-nourished. No distress.   HENT:   Head: Normocephalic and atraumatic.   Cardiovascular: Normal rate.   Pulmonary/Chest: Effort normal.   Abdominal: Soft. She exhibits distension (moderately  distended). There is tenderness (appropriately tender around incision). There is no rebound and no guarding.   Well-healing midline incision   Neurological: She is alert and oriented to person, place, and time.   Skin: Skin is warm.   Psychiatric: She has a normal mood and affect.       Significant Labs:  CBC:   Recent Labs   Lab 02/13/20  0335   WBC 10.06   RBC 2.60*   HGB 7.4*   HCT 23.9*      MCV 92   MCH 28.5   MCHC 31.0*     BMP:   Recent Labs   Lab 02/13/20  0335   GLU 91      K 3.4*   *   CO2 21*   BUN 7*   CREATININE 0.7   CALCIUM 7.9*   MG 1.7     CMP:   Recent Labs   Lab 02/10/20  1254  02/13/20  0335   *   < > 91   CALCIUM 7.7*   < > 7.9*   ALBUMIN 2.7*  --   --    PROT 4.3*  --   --       < > 140   K 3.9   < > 3.4*   CO2 21*   < > 21*      < > 112*   BUN 10   < > 7*   CREATININE 0.7   < > 0.7   ALKPHOS 55  --   --    ALT 8*  --   --    AST 24  --   --    BILITOT 2.0*  --   --     < > = values in this interval not displayed.     LFTs:   Recent Labs   Lab 02/10/20  1254   ALT 8*   AST 24   ALKPHOS 55   BILITOT 2.0*   PROT 4.3*   ALBUMIN 2.7*     Significant Diagnostics:  No new imaging

## 2020-02-13 NOTE — PROGRESS NOTES
Patient with episode of CP and SOB earlier this afternoon. States she still feels terrible but now with sx of nausea, abdominal pain (bulmaro RLQ), and low back pain. No more CP or SOB. Endorses history of palpitations.     EKG with premature supraventricular complexes as well as PVCs. This has occurred on prior EKGs as well.     Troponin neg.     Hypokalemia noted on CMP, will replace.     CXR notes left lung finding- psb infiltrate vs edema vs atelectasis. SOB has resolved. Lungs CTAB. Maintaining sats on room air. Will continue to monitor and work up further if sx return or VS change.     The above findings were discussed with the patient and her daughter. All questions answered at this time.    Veena Zabala MD  OB/GYN, PGY-4

## 2020-02-13 NOTE — SIGNIFICANT EVENT
Patient seen on afternoon rounds with Dr. Mckeon. Patient complaining of severe mid-sternal chest pain and some SOB. She also notes abdominal distension. No n/v but only able to eat a small amount of ice cream and chicken. SBP in 160s-170s with HR in the 100-110s. Just received some hydralazine and now SBP is 134. Abdomen is moderately distended with appropriate tenderness.     Will order EKG, CBC/chem/trop, CXR, and KUB to rule-out MI, arrhythmia, pneumonia, or some complication of the surgery. Discussed with primary team. Will check on her later this afternoon and follow-up on results of tests.     Elisabet Alonso, R4  General Surgery   [phone]  2/13/2020 1:52 PM

## 2020-02-13 NOTE — PLAN OF CARE
Patient remained alert and oriented throughout shift. Assessment completed & no alarming findings found.VS remained stable. All schedules/PRN medications administered as ordered. Tolerating a full liquid diet without any difficulty. Activity done with stand-by assistance; ambulates in room; Voids per toilet; one BM noted today. Abdominal incision clean, dry,  steri- strips intact.side rails up x2; call bell in place; bed in lowest, locked position; skid proof socks on; no evidence of skin breakdown; care plan explained to patient; no additional complaints at this time. Will continue routine plan of care.

## 2020-02-13 NOTE — ASSESSMENT & PLAN NOTE
Routine post op care  - Pain control: attempting to minimize narcotics. Will attempt to transition to PO analgesics/antiemetics   - sched ibuprofen PO   - tramadol 50/100mg PRN   - morphine IV PRN breakthrough  - Diet: full liquid, d/c IVFs as PO hydrating  - Colace BID  - PPx: lovenox ppx, SCDs, early ambulation, IS to bedside

## 2020-02-13 NOTE — ASSESSMENT & PLAN NOTE
83y/o female now POD 2 from JENA-BSO/right colectomy/para-caval node resection. She tolerated the procedure well. She is tolerating a full liquid diet and having BMs. Just had some nausea with liquid tylenol.      -Agree with advancing diet  -Consider switching to pill form of tylenol  - OOBTC and ambulation as tolerated  -Nausea meds prn   -Rest of care per primary team  -Will follow-up pathology  -Will continue to follow     Elisabet Alonso, LUANNE  General Surgery   [phone]  2/13/2020 8:01 AM

## 2020-02-13 NOTE — PLAN OF CARE
Evaluation complete and goals set.  Cont with POC  Ladonna Quiles, OT  2/13/2020    Problem: Occupational Therapy Goal  Goal: Occupational Therapy Goal  Description  Goals to be met by: 3/5    Patient will increase functional independence with ADLs by performing:    UE Dressing with [place].  LE Dressing with [place].  Grooming while seated with [place].  Toileting from toilet with Supervision for hygiene and clothing management.   Bathing from  edge of bed with Set-up Assistance.     Outcome: Ongoing, Progressing

## 2020-02-13 NOTE — PROGRESS NOTES
[facility]  Surgical Oncology  Progress Note    Subjective:     History of Present Illness:  No notes on file    Post-Op Info:  Procedure(s) (LRB):  HYSTERECTOMY, TOTAL, ABDOMINAL (N/A)  SALPINGO-OOPHORECTOMY, BILATERAL (N/A)  DEBULKING, NEOPLASM (N/A)  LYMPHADENECTOMY, RETROPERITONEUM (Right)  COLECTOMY (Right)  LYMPHADENECTOMY, PELVIS (Right)  CYSTOSCOPY (N/A)  CATHETERIZATION, URETER (Bilateral)   3 Days Post-Op     Interval History: No acute events overnight. Had a BM yesterday. Tolerating full liquid diet. She states she became nauseous this morning with liquid tylenol. Feels more distended this morning.     Medications:  Continuous Infusions:  Scheduled Meds:   docusate sodium  100 mg Oral BID    enoxaparin  40 mg Subcutaneous Daily    famotidine  20 mg Oral BID    losartan  100 mg Oral Q24H    mupirocin  1 g Nasal BID    scopolamine  1 patch Transdermal Q3 Days    sulfamethoxazole-trimethoprim 400-80mg  1 tablet Oral Q24H     PRN Meds:acetaminophen, albuterol-ipratropium, hydrALAZINE, morphine, ondansetron, promethazine (PHENERGAN) IVPB, traMADol, traMADol, zolpidem     Review of patient's allergies indicates:   Allergen Reactions    Pcn [penicillins] Other (See Comments)     Patient can't remember the reaction. Reaction occurred over 60 years     Objective:     Vital Signs (Most Recent):  Temp: 98.5 °F (36.9 °C) (02/13/20 0332)  Pulse: 98 (02/13/20 0332)  Resp: 16 (02/13/20 0332)  BP: (!) 163/73 (02/13/20 0332)  SpO2: 95 % (02/13/20 0332) Vital Signs (24h Range):  Temp:  [96.2 °F (35.7 °C)-98.5 °F (36.9 °C)] 98.5 °F (36.9 °C)  Pulse:  [] 98  Resp:  [16-18] 16  SpO2:  [92 %-99 %] 95 %  BP: (138-164)/(67-84) 163/73     Weight: 54.4 kg (120 lb)  Body mass index is 20.6 kg/m².    Intake/Output - Last 3 Shifts       02/11 0700 - 02/12 0659 02/12 0700 - 02/13 0659 02/13 0700 - 02/14 0659    P.O.  180     I.V. (mL/kg)       Blood       Total Intake(mL/kg)  180 (3.3)     Urine  (mL/kg/hr) 800 (0.6)      Blood       Total Output 800      Net -800 +180            Urine Occurrence 2 x 11 x     Stool Occurrence  2 x           Physical Exam   Constitutional: She is oriented to person, place, and time. She appears well-developed and well-nourished. No distress.   HENT:   Head: Normocephalic and atraumatic.   Cardiovascular: Normal rate.   Pulmonary/Chest: Effort normal.   Abdominal: Soft. She exhibits distension (moderately distended). There is tenderness (appropriately tender around incision). There is no rebound and no guarding.   Well-healing midline incision   Neurological: She is alert and oriented to person, place, and time.   Skin: Skin is warm.   Psychiatric: She has a normal mood and affect.       Significant Labs:  CBC:   Recent Labs   Lab 02/13/20  0335   WBC 10.06   RBC 2.60*   HGB 7.4*   HCT 23.9*      MCV 92   MCH 28.5   MCHC 31.0*     BMP:   Recent Labs   Lab 02/13/20  0335   GLU 91      K 3.4*   *   CO2 21*   BUN 7*   CREATININE 0.7   CALCIUM 7.9*   MG 1.7     CMP:   Recent Labs   Lab 02/10/20  1254  02/13/20  0335   *   < > 91   CALCIUM 7.7*   < > 7.9*   ALBUMIN 2.7*  --   --    PROT 4.3*  --   --       < > 140   K 3.9   < > 3.4*   CO2 21*   < > 21*      < > 112*   BUN 10   < > 7*   CREATININE 0.7   < > 0.7   ALKPHOS 55  --   --    ALT 8*  --   --    AST 24  --   --    BILITOT 2.0*  --   --     < > = values in this interval not displayed.     LFTs:   Recent Labs   Lab 02/10/20  1254   ALT 8*   AST 24   ALKPHOS 55   BILITOT 2.0*   PROT 4.3*   ALBUMIN 2.7*     Significant Diagnostics:  No new imaging    Assessment/Plan:     * S/P JENA-BSO/Right colectomy/para-caval node resection Feb 2020  83y/o female now POD 2 from JENA-BSO/right colectomy/para-caval node resection. She tolerated the procedure well. She is tolerating a full liquid diet and having BMs. Just had some nausea with liquid tylenol.      -Agree with advancing diet  -Consider  switching to pill form of tylenol  - OOBTC and ambulation as tolerated  -Nausea meds prn   -Rest of care per primary team  -Will follow-up pathology  -Will continue to follow     LUANNE Prado  General Surgery   [phone]  2/13/2020 8:01 AM             Elisabet Alonso MD  General Surgery  [facility]

## 2020-02-13 NOTE — PROGRESS NOTES
[facility]  Gynecologic Oncology  Progress Note      Patient Name: Salome Moore  MRN: [id number]  Admission Date: 2/10/2020  Hospital Length of Stay: 3 days  Attending Provider: Stephany Arredondo MD  Primary Care Provider: Ike Almeida MD  Principal Problem: S/P JENA-BSO    Follow-up For: Procedure(s) (LRB):  HYSTERECTOMY, TOTAL, ABDOMINAL (N/A)  SALPINGO-OOPHORECTOMY, BILATERAL (N/A)  DEBULKING, NEOPLASM (N/A)  LYMPHADENECTOMY, RETROPERITONEUM (Right)  COLECTOMY (Right)  LYMPHADENECTOMY, PELVIS (Right)  CYSTOSCOPY (N/A)  CATHETERIZATION, URETER (Bilateral)  Post-Operative Day: 3 Days Post-Op  Subjective:      History of Present Illness:  Salome Moore is a 84 y.o. who presents as scheduled for debulking surgery for advanced stage endometrial cancer.    Hospital Course:  02/10/2020: Underwent Ex Lap/JENA/BSO/LND for advanced stage endometrial cancer. Planned intra-operative consult with surgical oncology completed for large para-caval lymph node. 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 Right hemicolectomy also per surg onc. Received 3u pRBC intra-op (EBL 900cc). Overall patient tolerated well. See op notes for details.   2/11/2020: Slowly meeting post op milestones. CLD maintained due to nausea. +Voiding, +ambulating. Post op H/h 7.9/24.8.   2/12/2020: Diet advanced to full luquids. +Flatus.   02/13/2020: BM x 2.     Interval History:   Doing well. Slept better last night. Has decent pain control. Ate ice cream last night with no nausea. States is PO hydrating.   Voiding independently and ambulating with some assistance.  +Flatus, +BM (diarrhea).        Scheduled Meds:   acetaminophen  650 mg Oral Q6H    docusate sodium  100 mg Oral BID    enoxaparin  40 mg Subcutaneous Daily    famotidine (PF)  20 mg Intravenous Daily    losartan  100 mg Oral Q24H    mupirocin  1 g Nasal BID    scopolamine  1 patch Transdermal Q3 Days     sulfamethoxazole-trimethoprim 400-80mg  1 tablet Oral Q24H     Continuous Infusions:   sodium chloride 0.9% 100 mL/hr at 02/11/20 1518     PRN Meds:sodium chloride, acetaminophen, albuterol-ipratropium, hydrALAZINE, morphine, ondansetron, promethazine (PHENERGAN) IVPB, traMADol, traMADol, zolpidem    Review of patient's allergies indicates:   Allergen Reactions    Pcn [penicillins] Other (See Comments)     Patient can't remember the reaction. Reaction occurred over 60 years       Objective:     Vital Signs (Most Recent):  Temp: 98.5 °F (36.9 °C) (02/13/20 0332)  Pulse: 98 (02/13/20 0332)  Resp: 16 (02/13/20 0332)  BP: (!) 163/73 (02/13/20 0332)  SpO2: 95 % (02/13/20 0332) Vital Signs (24h Range):  Temp:  [96.2 °F (35.7 °C)-98.5 °F (36.9 °C)] 98.5 °F (36.9 °C)  Pulse:  [] 98  Resp:  [16-18] 16  SpO2:  [92 %-99 %] 95 %  BP: (138-164)/(67-84) 163/73     Weight: 54.4 kg (120 lb)  Body mass index is 20.6 kg/m².    Intake/Output - Last 3 Shifts       02/11 0700 - 02/12 0659 02/12 0700 - 02/13 0659    P.O.  180    Total Intake(mL/kg)  180 (3.3)    Urine (mL/kg/hr) 800 (0.6)     Total Output 800     Net -800 +180          Urine Occurrence 2 x 10 x    Stool Occurrence  1 x             Physical Exam:   Constitutional: She is oriented to person, place, and time. She appears well-developed and well-nourished. No distress.    HENT:   Head: Normocephalic and atraumatic.    Eyes: Conjunctivae and EOM are normal.    Neck: Neck supple.    Cardiovascular: Normal rate and regular rhythm.     Pulmonary/Chest: Effort normal and breath sounds normal. No respiratory distress. She has no wheezes. She has no rales. She exhibits no tenderness.        Abdominal: Soft. She exhibits distension (mild) and abdominal incision (midline vertical c/d/i). There is tenderness (approp). There is no rebound and no guarding.   BS present, hypoactive             Musculoskeletal: She exhibits no edema or tenderness.   SCDs in place        Neurological: She is alert and oriented to person, place, and time.    Skin: Skin is warm and dry. No rash noted.    Psychiatric: She has a normal mood and affect.       Lines/Drains/Airways     Peripheral Intravenous Line                 Midline Catheter Insertion/Assessment  - Single Lumen 02/11/20 1510 Right brachial vein 18g x 8cm 1 day                Laboratory:  CBC:   Recent Labs   Lab 02/12/20  0341 02/13/20  0335   WBC 12.41 10.06   HGB 7.7* 7.4*   HCT 23.3* 23.9*    324       Diagnostic Results:  n/a    Assessment/Plan:     * S/P JENA-BSO/Right colectomy/para-caval node resection Feb 2020  Routine post op care  - Pain control: attempting to minimize narcotics. Will attempt to transition to PO analgesics/antiemetics   - sched ibuprofen PO   - tramadol 50/100mg PRN   - morphine IV PRN breakthrough  - Diet: full liquid, d/c IVFs as PO hydrating  - Colace BID  - PPx: lovenox ppx, SCDs, early ambulation, IS to bedside      Postoperative anemia  - H/h stable 7/23  - 1 unit pRBC ordered to be held  - transfuse PRN symptoms (remains asymptomatic)    Constipation  - colace BID  - will add additional agents PRN  - Loose BM x 2 this AM    History of confusion  - Delirium precautions ordered  - Multimodal pain regimen to minimize opioid use  - Patient adamant about having ambien for sleep, understands risks    Endometrial cancer  - advanced disease at time of surgery  - now optimally debulked  - will need adjuvant chemotherapy after recovers    Essential hypertension  - continue Losartan  - hydralazine 10mg IV ordered PRN SBP> 180      VTE Risk Mitigation (From admission, onward)         Ordered     enoxaparin injection 40 mg  Daily      02/10/20 1241     IP VTE HIGH RISK PATIENT  Once      02/10/20 1241              Veena Zabala MD  OB/GYN, PGY-4

## 2020-02-13 NOTE — PLAN OF CARE
[facility]    HOME HEALTH ORDERS  FACE TO FACE ENCOUNTER    Patient Name: Salome Moore  YOB: 1935    PCP: Ike Almeida MD   PCP Address: [address] / [place] [address]  PCP Phone Number: [phone]  PCP Fax: [phone]    Discharging Team(s): Data Unavailable    Encounter Date: 02/13/2020    Admit to Home Health    Diagnoses:  Active Hospital Problems    Diagnosis  POA    *S/P JENA-BSO/Right colectomy/para-caval node resection Feb 2020 [Z90.710, Z90.722, Z90.79]  Not Applicable    Postoperative anemia [D64.9]  No    S/P JENA (total abdominal hysterectomy) [Z90.710]  Yes    History of confusion [Z87.898]  Not Applicable    Constipation [K59.00]  Yes    Endometrial cancer [C54.1]  Yes    Essential hypertension [I10]  Yes      Resolved Hospital Problems   No resolved problems to display.       Future Appointments   Date Time Provider Department Center   3/3/2020  1:45 PM Stephany Arredondo MD [facility] GYN [facility]   3/26/2020  3:40 PM Lilia Lepe MD [facility]           I have seen and examined this patient face to face today. My clinical findings that support the need for the home health skilled services and home bound status are the following:  Weakness/numbness causing balance and gait disturbance due to Weakness/Debility, Malignancy/Cancer and Surgery making it taxing to leave home.  Requiring assistive device to leave home due to unsteady gait caused by  Weakness/Debility, Malignancy/Cancer and Surgery.    Allergies:  Review of patient's allergies indicates:   Allergen Reactions    Pcn [penicillins] Other (See Comments)     Patient can't remember the reaction. Reaction occurred over 60 years       Diet: regular diet    Activities: no lifting, Driving, or Strenuous exercise for 6 weeks    Nursing:   SN to complete comprehensive assessment including routine vital signs. Instruct on disease process and s/s of  complications to report to MD. Review/verify medication list sent home with the patient at time of discharge  and instruct patient/caregiver as needed. Frequency may be adjusted depending on start of care date.    Notify MD if SBP > 160 or < 90; DBP > 90 or < 50; HR > 120 or < 50; Temp > 101; Other:         CONSULTS:    Physical Therapy to evaluate and treat. Evaluate for home safety and equipment needs; Establish/upgrade home exercise program. Perform / instruct on therapeutic exercises, gait training, transfer training, and Range of Motion.  Occupational Therapy to evaluate and treat. Evaluate home environment for safety and equipment needs. Perform/Instruct on transfers, ADL training, ROM, and therapeutic exercises.  Aide to provide assistance with personal care, ADLs, and vital signs.    MISCELLANEOUS CARE:  N/A    WOUND CARE ORDERS  n/a      Medications: Review discharge medications with patient and family and provide education.      Current Discharge Medication List      START taking these medications    Details   enoxaparin (LOVENOX) 40 mg/0.4 mL Syrg Inject 0.4 mLs (40 mg total) into the skin once daily. for 21 days  Qty: 8.4 mL, Refills: 0         CONTINUE these medications which have CHANGED    Details   traMADol (ULTRAM) 50 mg tablet Take 1 tablet (50 mg total) by mouth every 6 (six) hours as needed for Pain.  Qty: 30 tablet, Refills: 0    Comments: Quantity prescribed more than 7 day supply? Yes, medically necessary.  Associated Diagnoses: Uterine mass         CONTINUE these medications which have NOT CHANGED    Details   losartan (COZAAR) 100 MG tablet once daily.   Refills: 5      ondansetron (ZOFRAN) 8 MG tablet Take 1 tablet (8 mg total) by mouth every 8 (eight) hours as needed for Nausea.  Qty: 30 tablet, Refills: 1    Associated Diagnoses: Uterine mass      oxyCODONE (ROXICODONE) 15 MG Tab Take 1 tablet (15 mg total) by mouth every 4 (four) hours as needed for Pain.  Qty: 30 tablet, Refills: 0     Comments: Quantity prescribed more than 7 day supply? Yes, quantity medically necessary  Associated Diagnoses: Retroperitoneal mass      sulfamethoxazole-trimethoprim 400-80mg (BACTRIM,SEPTRA) 400-80 mg per tablet Take 1 tablet by mouth once daily.  Qty: 90 tablet, Refills: 3      zolpidem (AMBIEN) 10 mg Tab every evening.   Refills: 0      dicyclomine (BENTYL) 10 MG capsule TAKE 1 CAPSULE BY MOUTH 3 TIMES A DAY AS NEEDED FOR PAIN  Refills: 5      docusate sodium (STOOL SOFTENER ORAL) Take by mouth as needed (Constipation).      erythromycin with ethanol (THERAMYCIN) 2 % external solution 1 application every evening. Apply to affected area  Refills: 3      naproxen sodium (ALEVE ORAL) Take by mouth as needed (Abdominal pain).         STOP taking these medications       cephALEXin (KEFLEX) 500 MG capsule Comments:   Reason for Stopping:               I certify that this patient is confined to her home and needs intermittent skilled nursing care, physical therapy and occupational therapy.

## 2020-02-13 NOTE — PT/OT/SLP EVAL
Occupational Therapy   Evaluation    Name: Salome Moore  MRN: [id number]  Admitting Diagnosis:  S/P JENA-BSO 3 Days Post-Op    Recommendations:     Discharge Recommendations: home  Discharge Equipment Recommendations:  shower chair  Barriers to discharge:  Decreased caregiver support    Assessment:     Salome Moore is a 84 y.o. female with a medical diagnosis of S/P JENA-BSO.  She presents supine and in good spirits.  Pt with decreased functional performance form baseline.  Pt lives alone and with baseline indep functional performance and community access.  Pt with decreased functional performance and will benefit from skilled OT for maximum performance. Performance deficits affecting function: impaired endurance, impaired self care skills.      Rehab Prognosis: Good; patient would benefit from acute skilled OT services to address these deficits and reach maximum level of function.       Plan:     Patient to be seen 2 x/week to address the above listed problems via self-care/home management, therapeutic activities, therapeutic exercises  · Plan of Care Expires:    · Plan of Care Reviewed with: patient    Subjective     Chief Complaint: fatigue   Patient/Family Comments/goals: return to home and PLOF     Occupational Profile:  Living Environment: Pt lives alone in [facility] complex with no steps  Previous level of function: Pt indep with self care, home performance and community access  Equipment Used at Home:  none  Assistance upon Discharge: pt with daughters and friends available to assist     Pain/Comfort:  · Location - Orientation 1: generalized  · Location 1: abdomen  · Pain Addressed 1: Distraction, Reposition    Patients cultural, spiritual, Mormon conflicts given the current situation: no    Objective:     Communicated with: RN prior to session.  Patient found supine with peripheral IV, SCD upon OT entry to room.    General Precautions: Standard, fall   Orthopedic Precautions:N/A    Braces: N/A     Occupational Performance:    Bed Mobility:    · Patient completed Rolling/Turning to Right with modified independence  · Patient completed Scooting/Bridging with modified independence  · Patient completed Supine to Sit with contact guard assistance  · Patient completed Sit to Supine with contact guard assistance    Activities of Daily Living:  · Bathing: minimum assistance    · Upper Body Dressing: minimum assistance    · Lower Body Dressing: minimum assistance    · Toileting: minimum assistance      Cognitive/Visual Perceptual:  Cognitive/Psychosocial Skills:     -       Oriented to: Person, Place, Time and Situation   -       Follows Commands/attention:Follows two-step commands  -       Communication: clear/fluent  -       Memory: No Deficits noted  -       Safety awareness/insight to disability: intact   -       Mood/Affect/Coping skills/emotional control: Appropriate to situation    Physical Exam:  Upper Extremity Range of Motion:     -       Right Upper Extremity: WFL  -       Left Upper Extremity: WFL  Upper Extremity Strength:    -       Right Upper Extremity: WFL  -       Left Upper Extremity: WFL    AMPAC 6 Click ADL:  AMPAC Total Score: 21    Treatment & Education:  Evaluation complete and goals set.  Cont with POC  Pt educated on safety, role of OT, importance of increased participation in self care for gains , expectations for participation, expectations for gains, POC, energy conservation, caregiver strain. White board updated.     Education:    Patient left supine with all lines intact    GOALS:   Multidisciplinary Problems     Occupational Therapy Goals        Problem: Occupational Therapy Goal    Goal Priority Disciplines Outcome Interventions   Occupational Therapy Goal     OT, PT/OT Ongoing, Progressing    Description:  Goals to be met by: 3/5    Patient will increase functional independence with ADLs by performing:    UE Dressing with [place].  LE Dressing with  [place].  Grooming while seated with [place].  Toileting from toilet with Supervision for hygiene and clothing management.   Bathing from  edge of bed with Set-up Assistance.                      History:     Past Medical History:   Diagnosis Date    Basal cell carcinoma     Cataract     Cystitis     Glaucoma     Hypertension        Past Surgical History:   Procedure Laterality Date    BILATERAL SALPINGO-OOPHORECTOMY (BSO) N/A 2/10/2020    Procedure: SALPINGO-OOPHORECTOMY, BILATERAL;  Surgeon: Stephany Arredondo MD;  Location: [address];  Service: OB/GYN;  Laterality: N/A;    BREAST SURGERY      augmentation    CATARACT EXTRACTION W/  INTRAOCULAR LENS IMPLANT Bilateral     CATHETERIZATION OF URETER Bilateral 2/10/2020    Procedure: CATHETERIZATION, URETER;  Surgeon: Taj Taylor MD;  Location: [address];  Service: Urology;  Laterality: Bilateral;    COLECTOMY Right 2/10/2020    Procedure: COLECTOMY;  Surgeon: Hiren Mckeon MD;  Location: [address];  Service: General;  Laterality: Right;    CYSTOSCOPY N/A 2/10/2020    Procedure: CYSTOSCOPY;  Surgeon: Taj Taylor MD;  Location: [address];  Service: Urology;  Laterality: N/A;    DEBULKING OF TUMOR N/A 2/10/2020    Procedure: DEBULKING, NEOPLASM;  Surgeon: Stephany Arredondo MD;  Location: [address];  Service: OB/GYN;  Laterality: N/A;    EYE SURGERY      RETROPERITONEAL LYMPHADENECTOMY Right 2/10/2020    Procedure: LYMPHADENECTOMY, RETROPERITONEUM;  Surgeon: Hiren Mckeon MD;  Location: [address];  Service: General;  Laterality: Right;    TOTAL ABDOMINAL HYSTERECTOMY N/A 2/10/2020    Procedure: HYSTERECTOMY, TOTAL, ABDOMINAL;  Surgeon: Stephany Arredondo MD;  Location: [address];  Service: OB/GYN;  Laterality: N/A;    VAGINAL DELIVERY         Time Tracking:     OT Date of Treatment: 02/13/20  OT Start Time: 0820  OT Stop Time: 0835  OT Total Time (min): 15 min    Billable  Minutes:Evaluation 15    Ladonna Quiles, OT  2/13/2020

## 2020-02-14 LAB
ANION GAP SERPL CALC-SCNC: 8 MMOL/L (ref 8–16)
BASOPHILS # BLD AUTO: 0.06 K/UL (ref 0–0.2)
BASOPHILS NFR BLD: 0.6 % (ref 0–1.9)
BLD PROD TYP BPU: NORMAL
BLD PROD TYP BPU: NORMAL
BLOOD UNIT EXPIRATION DATE: NORMAL
BLOOD UNIT EXPIRATION DATE: NORMAL
BLOOD UNIT TYPE CODE: 6200
BLOOD UNIT TYPE CODE: 6200
BLOOD UNIT TYPE: NORMAL
BLOOD UNIT TYPE: NORMAL
BUN SERPL-MCNC: 7 MG/DL (ref 8–23)
CALCIUM SERPL-MCNC: 7.9 MG/DL (ref 8.7–10.5)
CHLORIDE SERPL-SCNC: 110 MMOL/L (ref 95–110)
CO2 SERPL-SCNC: 20 MMOL/L (ref 23–29)
CODING SYSTEM: NORMAL
CODING SYSTEM: NORMAL
CREAT SERPL-MCNC: 0.7 MG/DL (ref 0.5–1.4)
DIFFERENTIAL METHOD: ABNORMAL
DISPENSE STATUS: NORMAL
DISPENSE STATUS: NORMAL
EOSINOPHIL # BLD AUTO: 0.5 K/UL (ref 0–0.5)
EOSINOPHIL NFR BLD: 4.5 % (ref 0–8)
ERYTHROCYTE [DISTWIDTH] IN BLOOD BY AUTOMATED COUNT: 14.6 % (ref 11.5–14.5)
EST. GFR  (AFRICAN AMERICAN): >60 ML/MIN/1.73 M^2
EST. GFR  (NON AFRICAN AMERICAN): >60 ML/MIN/1.73 M^2
GLUCOSE SERPL-MCNC: 101 MG/DL (ref 70–110)
HCT VFR BLD AUTO: 25.5 % (ref 37–48.5)
HGB BLD-MCNC: 8 G/DL (ref 12–16)
IMM GRANULOCYTES # BLD AUTO: 0.05 K/UL (ref 0–0.04)
IMM GRANULOCYTES NFR BLD AUTO: 0.5 % (ref 0–0.5)
LYMPHOCYTES # BLD AUTO: 2.1 K/UL (ref 1–4.8)
LYMPHOCYTES NFR BLD: 20.5 % (ref 18–48)
MAGNESIUM SERPL-MCNC: 1.7 MG/DL (ref 1.6–2.6)
MCH RBC QN AUTO: 28.9 PG (ref 27–31)
MCHC RBC AUTO-ENTMCNC: 31.4 G/DL (ref 32–36)
MCV RBC AUTO: 92 FL (ref 82–98)
MONOCYTES # BLD AUTO: 1 K/UL (ref 0.3–1)
MONOCYTES NFR BLD: 9.7 % (ref 4–15)
NEUTROPHILS # BLD AUTO: 6.4 K/UL (ref 1.8–7.7)
NEUTROPHILS NFR BLD: 64.2 % (ref 38–73)
NRBC BLD-RTO: 0 /100 WBC
NUM UNITS TRANS PACKED RBC: NORMAL
NUM UNITS TRANS PACKED RBC: NORMAL
PHOSPHATE SERPL-MCNC: 2.7 MG/DL (ref 2.7–4.5)
PLATELET # BLD AUTO: 426 K/UL (ref 150–350)
PMV BLD AUTO: 9.1 FL (ref 9.2–12.9)
POTASSIUM SERPL-SCNC: 3.4 MMOL/L (ref 3.5–5.1)
RBC # BLD AUTO: 2.77 M/UL (ref 4–5.4)
SODIUM SERPL-SCNC: 138 MMOL/L (ref 136–145)
WBC # BLD AUTO: 9.99 K/UL (ref 3.9–12.7)

## 2020-02-14 PROCEDURE — 25500020 PHARM REV CODE 255: Performed by: OBSTETRICS & GYNECOLOGY

## 2020-02-14 PROCEDURE — 25000003 PHARM REV CODE 250: Performed by: STUDENT IN AN ORGANIZED HEALTH CARE EDUCATION/TRAINING PROGRAM

## 2020-02-14 PROCEDURE — 84100 ASSAY OF PHOSPHORUS: CPT

## 2020-02-14 PROCEDURE — 94761 N-INVAS EAR/PLS OXIMETRY MLT: CPT

## 2020-02-14 PROCEDURE — 63600175 PHARM REV CODE 636 W HCPCS: Performed by: STUDENT IN AN ORGANIZED HEALTH CARE EDUCATION/TRAINING PROGRAM

## 2020-02-14 PROCEDURE — 83735 ASSAY OF MAGNESIUM: CPT

## 2020-02-14 PROCEDURE — 80048 BASIC METABOLIC PNL TOTAL CA: CPT

## 2020-02-14 PROCEDURE — 97116 GAIT TRAINING THERAPY: CPT

## 2020-02-14 PROCEDURE — 25000003 PHARM REV CODE 250: Performed by: OBSTETRICS & GYNECOLOGY

## 2020-02-14 PROCEDURE — 20600001 HC STEP DOWN PRIVATE ROOM

## 2020-02-14 PROCEDURE — 99900035 HC TECH TIME PER 15 MIN (STAT)

## 2020-02-14 PROCEDURE — 85025 COMPLETE CBC W/AUTO DIFF WBC: CPT

## 2020-02-14 PROCEDURE — 99024 PR POST-OP FOLLOW-UP VISIT: ICD-10-PCS | Mod: ,,, | Performed by: OBSTETRICS & GYNECOLOGY

## 2020-02-14 PROCEDURE — 99024 POSTOP FOLLOW-UP VISIT: CPT | Mod: ,,, | Performed by: OBSTETRICS & GYNECOLOGY

## 2020-02-14 RX ORDER — POLYETHYLENE GLYCOL 3350 17 G/17G
17 POWDER, FOR SOLUTION ORAL DAILY
Status: DISCONTINUED | OUTPATIENT
Start: 2020-02-14 | End: 2020-02-17 | Stop reason: HOSPADM

## 2020-02-14 RX ORDER — POTASSIUM CHLORIDE 20 MEQ/1
20 TABLET, EXTENDED RELEASE ORAL ONCE
Status: COMPLETED | OUTPATIENT
Start: 2020-02-14 | End: 2020-02-14

## 2020-02-14 RX ORDER — BISACODYL 10 MG
10 SUPPOSITORY, RECTAL RECTAL ONCE
Status: DISCONTINUED | OUTPATIENT
Start: 2020-02-14 | End: 2020-02-14

## 2020-02-14 RX ORDER — TRAMADOL HYDROCHLORIDE 50 MG/1
50 TABLET ORAL EVERY 4 HOURS PRN
Status: DISCONTINUED | OUTPATIENT
Start: 2020-02-14 | End: 2020-02-17 | Stop reason: HOSPADM

## 2020-02-14 RX ORDER — TRAMADOL HYDROCHLORIDE 50 MG/1
50 TABLET ORAL EVERY 6 HOURS PRN
Qty: 25 TABLET | Refills: 0 | Status: SHIPPED | OUTPATIENT
Start: 2020-02-14 | End: 2020-03-15

## 2020-02-14 RX ADMIN — ONDANSETRON 8 MG: 2 INJECTION INTRAMUSCULAR; INTRAVENOUS at 08:02

## 2020-02-14 RX ADMIN — ONDANSETRON 8 MG: 2 INJECTION INTRAMUSCULAR; INTRAVENOUS at 02:02

## 2020-02-14 RX ADMIN — DOCUSATE SODIUM 100 MG: 100 CAPSULE, LIQUID FILLED ORAL at 08:02

## 2020-02-14 RX ADMIN — MUPIROCIN 1 G: 20 OINTMENT TOPICAL at 08:02

## 2020-02-14 RX ADMIN — SULFAMETHOXAZOLE AND TRIMETHOPRIM 1 TABLET: 400; 80 TABLET ORAL at 08:02

## 2020-02-14 RX ADMIN — LOSARTAN POTASSIUM 100 MG: 25 TABLET ORAL at 08:02

## 2020-02-14 RX ADMIN — IOHEXOL 75 ML: 350 INJECTION, SOLUTION INTRAVENOUS at 06:02

## 2020-02-14 RX ADMIN — BENZONATATE 100 MG: 100 CAPSULE ORAL at 10:02

## 2020-02-14 RX ADMIN — TRAMADOL HYDROCHLORIDE 50 MG: 50 TABLET, FILM COATED ORAL at 08:02

## 2020-02-14 RX ADMIN — ZOLPIDEM TARTRATE 5 MG: 5 TABLET, COATED ORAL at 09:02

## 2020-02-14 RX ADMIN — TRAMADOL HYDROCHLORIDE 100 MG: 50 TABLET, FILM COATED ORAL at 02:02

## 2020-02-14 RX ADMIN — MUPIROCIN 1 G: 20 OINTMENT TOPICAL at 09:02

## 2020-02-14 RX ADMIN — TRAMADOL HYDROCHLORIDE 100 MG: 50 TABLET, FILM COATED ORAL at 08:02

## 2020-02-14 RX ADMIN — POTASSIUM CHLORIDE 20 MEQ: 1500 TABLET, EXTENDED RELEASE ORAL at 08:02

## 2020-02-14 RX ADMIN — POLYETHYLENE GLYCOL 3350 17 G: 17 POWDER, FOR SOLUTION ORAL at 12:02

## 2020-02-14 RX ADMIN — POTASSIUM CHLORIDE, DEXTROSE MONOHYDRATE AND SODIUM CHLORIDE: 150; 5; 450 INJECTION, SOLUTION INTRAVENOUS at 09:02

## 2020-02-14 RX ADMIN — TRAMADOL HYDROCHLORIDE 50 MG: 50 TABLET, FILM COATED ORAL at 05:02

## 2020-02-14 RX ADMIN — FAMOTIDINE 20 MG: 20 TABLET ORAL at 08:02

## 2020-02-14 RX ADMIN — ENOXAPARIN SODIUM 40 MG: 100 INJECTION SUBCUTANEOUS at 05:02

## 2020-02-14 RX ADMIN — TRAMADOL HYDROCHLORIDE 50 MG: 50 TABLET, FILM COATED ORAL at 12:02

## 2020-02-14 NOTE — PT/OT/SLP PROGRESS
"Physical Therapy Treatment and discharge    Patient Name:  Salome Moore   MRN:  [id number]    Recommendations:     Discharge Recommendations:  home with home health   Discharge Equipment Recommendations: shower chair(pending progress)   Barriers to discharge: None    Assessment:     Salome Moore is a 84 y.o. female admitted with a medical diagnosis of S/P JENA-BSO.  She presents with the following impairments/functional limitations:  impaired endurance Patient participated well. Ambulates 500 feet without AD w/ supervision. Mod(I) for transfers and bed mobility. Patient walking on unit w/ nursing supervision. Changing discharge recommendation to home health service to maximize patient safety and functional mobility in the home. Patient discharged from acute care physical therapy w/ goals met. Patient verbalizes understanding to continue ambulation in hallways w/ nursing supervision. .    Rehab Prognosis: Good;   Recent Surgery: Procedure(s) (LRB):  HYSTERECTOMY, TOTAL, ABDOMINAL (N/A)  SALPINGO-OOPHORECTOMY, BILATERAL (N/A)  DEBULKING, NEOPLASM (N/A)  LYMPHADENECTOMY, RETROPERITONEUM (Right)  COLECTOMY (Right)  LYMPHADENECTOMY, PELVIS (Right)  CYSTOSCOPY (N/A)  CATHETERIZATION, URETER (Bilateral) 4 Days Post-Op    Plan:     During this hospitalization, patient to be seen (d/c acute PT services) to address the identified rehab impairments via gait training, therapeutic activities, therapeutic exercises, neuromuscular re-education and progress toward the following goals:    · Plan of Care Expires:  03/13/20    Subjective     Chief Complaint: abdominal pain, "I get meds every 4 hours"  Patient/Family Comments/goals: patient reports she had hoped to go to rehab at [facility]; reports a friend will assist her at home at time of discharge  Pain/Comfort:  · Pain Rating 1: (not rated; reports had pain meds; pain abdominal in incision area)  · Location - Orientation 1: generalized  · Location 1: " abdomen  · Pain Addressed 1: Distraction  · Pain Rating Post-Intervention 1: (not rated)      Objective:     Communicated with nurse prior to session.  Patient found walking in room with   upon PT entry to room.     General Precautions: Standard, fall   Orthopedic Precautions:N/A   Braces: N/A     Functional Mobility:  · Bed Mobility:     · Sit to Supine: modified independence  · Transfers:     · Sit to Stand:  modified independence with no AD  · Bed to Chair: modified independence with  no AD  using  Step Transfer  · Gait: ambulates without AD and supervision 500 feet. Samll steps. Cues to keep hands out of pockets for safety, Some smaller steps and occ narrow DEB, improves as trial progressess.       AM-PAC 6 CLICK MOBILITY  Turning over in bed (including adjusting bedclothes, sheets and blankets)?: 4  Sitting down on and standing up from a chair with arms (e.g., wheelchair, bedside commode, etc.): 4  Moving from lying on back to sitting on the side of the bed?: 4  Moving to and from a bed to a chair (including a wheelchair)?: 4  Need to walk in hospital room?: 4  Climbing 3-5 steps with a railing?: 3  Basic Mobility Total Score: 23       Therapeutic Activities and Exercises:   Patient stands and donns/doffs robe and shoes in standing w/o LOB and w/o assistance  Patient declines to perform exercises.  Reports understanding and compliance w/ walking 4x daily w/ nursing supervision.  Patient educated on safety, call for assistance; discharge from acute PT services.    Patient left HOB elevated with call button in reach..    GOALS:   Multidisciplinary Problems     Physical Therapy Goals     Not on file          Multidisciplinary Problems (Resolved)        Problem: Physical Therapy Goal    Goal Priority Disciplines Outcome Goal Variances Interventions   Physical Therapy Goal   (Resolved)     PT, PT/OT Met     Description:  Goals to be met by: 2/26/2020     Patient will increase functional independence with mobility by  performin. Sit to stand transfer with Modified [place]=met 2020  2. Bed to chair transfer with Supervision using LRAD= met 2020  3. Gait  x 400 feet with Supervision using LRAD. = met 2020                       Time Tracking:     PT Received On: 20  PT Start Time: 1315     PT Stop Time: 1330  PT Total Time (min): 15 min     Billable Minutes: Gait Training 15    Treatment Type: Treatment  PT/PTA: PT     PTA Visit Number: 0     Shawna Forman, PT  2020

## 2020-02-14 NOTE — PLAN OF CARE
"POC reviewed with pt. C/O pain and nausea throughout shift. PRN Tramadol, Zofran, and Phenergan given. Refused Potassium supplements. Cont fluids held. MD aware of both will "readdress in morning". Ambulating independently. Frequent rounds made to assess pain and safety. Patient remained free from falls. Bed in lowest position. Side rails up X 2. Call light within reach. Will continue to monitor.     "

## 2020-02-14 NOTE — PLAN OF CARE
Plan of care reviewed with patient at beginning of shift. Patient C/O of pain and received tramadol Q4. Pain has been controlled. Pt did not complain of nausea, but also did not eat much.  Pt ambulated in the [place] twice today. Pt had no episodes of hypertension that required hydralazine or other inerventions. Pt had 2 BM. Pt had a CTA done today. Pt is on regular diet. Pt refuses IV fluids and said she will just drink more. Pt also would not take potassium by mouth. VSS. Afebrile.  Bed locked in lowest position. Side rails up x2. Call bell within reach. BADL within reach. Rounding Q1H. Will continue to monitor.

## 2020-02-14 NOTE — PLAN OF CARE
Plan of care reviewed with patient at beginning of shift. Patient C/O of pain and received tramadol. Pt complained of nausea and received scopolamine and zofran. Pt complained of chest pain and had a full cardiac workup. Findings were unremarkable and fluids of D5 and half NS w/20 meq of K was started. Pt refused tele and vios. Pt complained of cough and a tesselon kaia was given. Pt stated she had not had a shower in 4 days. I went to give pt a shower and when I had her sit in the shower she started crying and screaming that she needed to get back in bed. Shower was not completed because pt said she cannot take it. Pt had an episode of HTN. Hydralazine was given. Bed locked in lowest position. Side rails up x2. Call bell within reach. BADL within reach. Rounding Q1H. Will continue to monitor.

## 2020-02-14 NOTE — SUBJECTIVE & OBJECTIVE
Interval History:   Reports increased discomfort overnight and yesterday afternoon. Continues to be nauseated. Not actively vomited but dry heaving. +Flatus, no further BM since diarrhea yesterday AM.      Scheduled Meds:   docusate sodium  100 mg Oral BID    enoxaparin  40 mg Subcutaneous Daily    famotidine  20 mg Oral BID    losartan  100 mg Oral Q24H    mupirocin  1 g Nasal BID    potassium chloride  20 mEq Oral Once    scopolamine  1 patch Transdermal Q3 Days    sulfamethoxazole-trimethoprim 400-80mg  1 tablet Oral Q24H     Continuous Infusions:   dextrose 5 % and 0.45 % NaCl with KCl 20 mEq 100 mL/hr at 02/13/20 1758     PRN Meds:acetaminophen, albuterol-ipratropium, benzonatate, guaifenesin 100 mg/5 ml, hydrALAZINE, morphine, ondansetron, promethazine (PHENERGAN) IVPB, traMADol, traMADol, zolpidem    Review of patient's allergies indicates:   Allergen Reactions    Pcn [penicillins] Other (See Comments)     Patient can't remember the reaction. Reaction occurred over 60 years       Objective:     Vital Signs (Most Recent):  Temp: 98.5 °F (36.9 °C) (02/14/20 0427)  Pulse: (!) 112 (02/14/20 0427)  Resp: 18 (02/14/20 0427)  BP: (!) 151/81 (02/14/20 0427)  SpO2: 95 % (02/14/20 0427) Vital Signs (24h Range):  Temp:  [97 °F (36.1 °C)-98.5 °F (36.9 °C)] 98.5 °F (36.9 °C)  Pulse:  [100-119] 112  Resp:  [16-22] 18  SpO2:  [92 %-97 %] 95 %  BP: (128-174)/(64-94) 151/81     Weight: 54.4 kg (120 lb)  Body mass index is 20.6 kg/m².    Intake/Output - Last 3 Shifts       02/12 0700 - 02/13 0659 02/13 0700 - 02/14 0659    P.O. 180 350    I.V. (mL/kg)  86.7 (1.6)    Total Intake(mL/kg) 180 (3.3) 436.7 (8)    Net +180 +436.7          Urine Occurrence 11 x 2 x    Stool Occurrence 2 x              Physical Exam:   Constitutional: She is oriented to person, place, and time. She appears well-developed and well-nourished. No distress.    HENT:   Head: Normocephalic and atraumatic.    Eyes: Conjunctivae and EOM are normal.     Neck: Neck supple.    Cardiovascular: Normal rate and regular rhythm.     Pulmonary/Chest: Effort normal and breath sounds normal. No respiratory distress. She has no wheezes. She has no rales. She exhibits no tenderness.        Abdominal: Soft. She exhibits distension (mild to mod) and abdominal incision (midline vertical c/d/i). There is tenderness (approp). There is no rebound and no guarding.   BS present, hypoactive             Musculoskeletal: She exhibits edema (1+ pitting edema bilateral feet). She exhibits no tenderness.       Neurological: She is alert and oriented to person, place, and time.    Skin: Skin is warm and dry. No rash noted.    Psychiatric: She has a normal mood and affect.       Lines/Drains/Airways     Peripheral Intravenous Line                 Midline Catheter Insertion/Assessment  - Single Lumen 02/11/20 1510 Right brachial vein 18g x 8cm 2 days                Laboratory:  CBC:   Recent Labs   Lab 02/13/20  0335 02/13/20  1346 02/14/20  0400   WBC 10.06 11.80 9.99   HGB 7.4* 8.7* 8.0*   HCT 23.9* 27.3* 25.5*    439* 426*       Diagnostic Results:  n/a

## 2020-02-14 NOTE — PROGRESS NOTES
[facility]  Gynecologic Oncology  Progress Note      Patient Name: Salome Moore  MRN: [id number]  Admission Date: 2/10/2020  Hospital Length of Stay: 4 days  Attending Provider: Stephany Arredondo MD  Primary Care Provider: Ike Almeida MD  Principal Problem: S/P JENA-BSO    Follow-up For: Procedure(s) (LRB):  HYSTERECTOMY, TOTAL, ABDOMINAL (N/A)  SALPINGO-OOPHORECTOMY, BILATERAL (N/A)  DEBULKING, NEOPLASM (N/A)  LYMPHADENECTOMY, RETROPERITONEUM (Right)  COLECTOMY (Right)  LYMPHADENECTOMY, PELVIS (Right)  CYSTOSCOPY (N/A)  CATHETERIZATION, URETER (Bilateral)  Post-Operative Day: 4 Days Post-Op  Subjective:      History of Present Illness:  Salome Moore is a 84 y.o. who presents as scheduled for debulking surgery for advanced stage endometrial cancer.    Hospital Course:  02/10/2020: Underwent Ex Lap/JENA/BSO/LND for advanced stage endometrial cancer. Planned intra-operative consult with surgical oncology completed for large para-caval lymph node. 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 Right hemicolectomy also per surg onc. Received 3u pRBC intra-op (EBL 900cc). Overall patient tolerated well. See op notes for details.   2/11/2020: Slowly meeting post op milestones. CLD maintained due to nausea. +Voiding, +ambulating. Post op H/h 7.9/24.8.   2/12/2020: Diet advanced to full luquids. +Flatus.   02/13/2020: BM x 2 (diarrhea). Episode of SOB with palpitations. EKG with PVCs. CXR with questionable infiltrate vs atelectasis, suspect the latter. Doing well on RA.     Interval History:   Reports increased discomfort overnight and yesterday afternoon. Continues to be nauseated. Not actively vomited but dry heaving. +Flatus, no further BM since diarrhea yesterday AM.      Scheduled Meds:   docusate sodium  100 mg Oral BID    enoxaparin  40 mg Subcutaneous Daily    famotidine  20 mg Oral BID    losartan  100 mg Oral Q24H    mupirocin  1 g  Nasal BID    potassium chloride  20 mEq Oral Once    scopolamine  1 patch Transdermal Q3 Days    sulfamethoxazole-trimethoprim 400-80mg  1 tablet Oral Q24H     Continuous Infusions:   dextrose 5 % and 0.45 % NaCl with KCl 20 mEq 100 mL/hr at 02/13/20 1758     PRN Meds:acetaminophen, albuterol-ipratropium, benzonatate, guaifenesin 100 mg/5 ml, hydrALAZINE, morphine, ondansetron, promethazine (PHENERGAN) IVPB, traMADol, traMADol, zolpidem    Review of patient's allergies indicates:   Allergen Reactions    Pcn [penicillins] Other (See Comments)     Patient can't remember the reaction. Reaction occurred over 60 years       Objective:     Vital Signs (Most Recent):  Temp: 98.5 °F (36.9 °C) (02/14/20 0427)  Pulse: (!) 112 (02/14/20 0427)  Resp: 18 (02/14/20 0427)  BP: (!) 151/81 (02/14/20 0427)  SpO2: 95 % (02/14/20 0427) Vital Signs (24h Range):  Temp:  [97 °F (36.1 °C)-98.5 °F (36.9 °C)] 98.5 °F (36.9 °C)  Pulse:  [100-119] 112  Resp:  [16-22] 18  SpO2:  [92 %-97 %] 95 %  BP: (128-174)/(64-94) 151/81     Weight: 54.4 kg (120 lb)  Body mass index is 20.6 kg/m².    Intake/Output - Last 3 Shifts       02/12 0700 - 02/13 0659 02/13 0700 - 02/14 0659    P.O. 180 350    I.V. (mL/kg)  86.7 (1.6)    Total Intake(mL/kg) 180 (3.3) 436.7 (8)    Net +180 +436.7          Urine Occurrence 11 x 2 x    Stool Occurrence 2 x              Physical Exam:   Constitutional: She is oriented to person, place, and time. She appears well-developed and well-nourished. No distress.    HENT:   Head: Normocephalic and atraumatic.    Eyes: Conjunctivae and EOM are normal.    Neck: Neck supple.    Cardiovascular: Normal rate and regular rhythm.     Pulmonary/Chest: Effort normal and breath sounds normal. No respiratory distress. She has no wheezes. She has no rales. She exhibits no tenderness.        Abdominal: Soft. She exhibits distension (mild to mod) and abdominal incision (midline vertical c/d/i). There is tenderness (approp). There is no  rebound and no guarding.   BS present, hypoactive             Musculoskeletal: She exhibits edema (1+ pitting edema bilateral feet). She exhibits no tenderness.       Neurological: She is alert and oriented to person, place, and time.    Skin: Skin is warm and dry. No rash noted.    Psychiatric: She has a normal mood and affect.       Lines/Drains/Airways     Peripheral Intravenous Line                 Midline Catheter Insertion/Assessment  - Single Lumen 02/11/20 1510 Right brachial vein 18g x 8cm 2 days                Laboratory:  CBC:   Recent Labs   Lab 02/13/20  0335 02/13/20  1346 02/14/20  0400   WBC 10.06 11.80 9.99   HGB 7.4* 8.7* 8.0*   HCT 23.9* 27.3* 25.5*    439* 426*       Diagnostic Results:  n/a    Assessment/Plan:     * S/P JENA-BSO/Right colectomy/para-caval node resection Feb 2020  Routine post op care  - Pain control: attempting to minimize narcotics. Will attempt to transition to PO analgesics/antiemetics   - sched ibuprofen PO   - tramadol 50/100mg PRN   - morphine IV PRN breakthrough  - Diet: full liquid  - Colace BID  - PPx: lovenox ppx, SCDs, early ambulation, IS to bedside    Postoperative anemia  - H/h stable 8/25  - asymptomatic    Constipation  - colace BID  - will add additional agents PRN  - Loose BM x 2 yesterday    History of confusion  - Delirium precautions ordered  - Multimodal pain regimen to minimize opioid use  - Patient adamant about having ambien for sleep, understands risks    Endometrial cancer  - advanced disease at time of surgery  - now optimally debulked  - will need adjuvant chemotherapy after recovers    Essential hypertension  - continue Losartan  - hydralazine 10mg IV ordered PRN SBP> 180      VTE Risk Mitigation (From admission, onward)         Ordered     enoxaparin injection 40 mg  Daily      02/10/20 1241     IP VTE HIGH RISK PATIENT  Once      02/10/20 1241                Was maravilla catheter removed? Yes    Veena Jean MD  Gynecologic  Oncology  [facility]

## 2020-02-14 NOTE — CARE UPDATE
Rapid Response Nurse Chart Check     Chart check completed, abnormal VS noted. Charge RN Fernandez  contacted, no concerns verbalized at this time, instructed to call [phone] for further concerns or assistance.

## 2020-02-14 NOTE — PLAN OF CARE
Problem: Physical Therapy Goal  Goal: Physical Therapy Goal  Description  Goals to be met by: 2020     Patient will increase functional independence with mobility by performin. Sit to stand transfer with Modified [place]=met 2020  2. Bed to chair transfer with Supervision using LRAD= met 2020  3. Gait  x 400 feet with Supervision using LRAD. = met 2020      Outcome: Met

## 2020-02-14 NOTE — ASSESSMENT & PLAN NOTE
- Delirium precautions ordered  - Multimodal pain regimen to minimize opioid use  - Patient adamant about having ambien for sleep, understands risks

## 2020-02-14 NOTE — SUBJECTIVE & OBJECTIVE
Interval History: Pt seen and examined at bedside. Loose BM x3 overnight. Pt has been up walking with PT and feels much better.    Medications:  Continuous Infusions:   dextrose 5 % and 0.45 % NaCl with KCl 20 mEq 100 mL/hr at 02/14/20 0959     Scheduled Meds:   docusate sodium  100 mg Oral BID    enoxaparin  40 mg Subcutaneous Daily    famotidine  20 mg Oral BID    losartan  100 mg Oral Q24H    mupirocin  1 g Nasal BID    polyethylene glycol  17 g Oral Daily    scopolamine  1 patch Transdermal Q3 Days    sulfamethoxazole-trimethoprim 400-80mg  1 tablet Oral Q24H     PRN Meds:acetaminophen, albuterol-ipratropium, benzonatate, guaifenesin 100 mg/5 ml, hydrALAZINE, morphine, ondansetron, promethazine (PHENERGAN) IVPB, traMADol, traMADol, zolpidem     Review of patient's allergies indicates:   Allergen Reactions    Pcn [penicillins] Other (See Comments)     Patient can't remember the reaction. Reaction occurred over 60 years     Objective:     Vital Signs (Most Recent):  Temp: 98.3 °F (36.8 °C) (02/15/20 0802)  Pulse: 107 (02/15/20 0802)  Resp: 19 (02/15/20 0802)  BP: (!) 156/92 (02/15/20 0802)  SpO2: (!) 94 % (02/15/20 0802) Vital Signs (24h Range):  Temp:  [98.3 °F (36.8 °C)-99.2 °F (37.3 °C)] 98.3 °F (36.8 °C)  Pulse:  [103-120] 107  Resp:  [17-20] 19  SpO2:  [90 %-96 %] 94 %  BP: (108-168)/(56-92) 156/92     Weight: 54.4 kg (120 lb)  Body mass index is 20.6 kg/m².    Intake/Output - Last 3 Shifts       02/13 0700 - 02/14 0659 02/14 0700 - 02/15 0659 02/15 0700 - 02/16 0659    P.O. 350 300     I.V. (mL/kg) 86.7 (1.6) 791.7 (14.6)     Total Intake(mL/kg) 436.7 (8) 1091.7 (20.1)     Net +436.7 +1091.7            Urine Occurrence 2 x 2 x     Stool Occurrence  2 x           Physical Exam   Tachycardic  In no distress, unlabored breathing  Abdomen is soft, some mild distention, no rebound, very tender to palpation, histrionic.   Some lower extremity swelling    Significant Labs:  CBC:   Recent Labs   Lab  02/15/20  0241   WBC 7.00   RBC 2.59*   HGB 7.6*   HCT 24.2*      MCV 93   MCH 29.3   MCHC 31.4*     CMP:   Recent Labs   Lab 02/13/20  1346  02/15/20  0550      < > 91   CALCIUM 8.4*   < > 8.1*   ALBUMIN 2.9*  --   --    PROT 5.5*  --   --       < > 139   K 3.2*   < > 3.4*   CO2 19*   < > 25      < > 106   BUN 7*   < > 7*   CREATININE 0.7   < > 0.7   ALKPHOS 99  --   --    ALT 24  --   --    AST 48*  --   --    BILITOT 0.5  --   --     < > = values in this interval not displayed.       Significant Diagnostics:  None

## 2020-02-15 LAB
ANION GAP SERPL CALC-SCNC: 8 MMOL/L (ref 8–16)
BASOPHILS # BLD AUTO: 0.07 K/UL (ref 0–0.2)
BASOPHILS NFR BLD: 1 % (ref 0–1.9)
BUN SERPL-MCNC: 7 MG/DL (ref 8–23)
CALCIUM SERPL-MCNC: 8.1 MG/DL (ref 8.7–10.5)
CHLORIDE SERPL-SCNC: 106 MMOL/L (ref 95–110)
CO2 SERPL-SCNC: 25 MMOL/L (ref 23–29)
CREAT SERPL-MCNC: 0.7 MG/DL (ref 0.5–1.4)
DIFFERENTIAL METHOD: ABNORMAL
EOSINOPHIL # BLD AUTO: 0.6 K/UL (ref 0–0.5)
EOSINOPHIL NFR BLD: 8.7 % (ref 0–8)
ERYTHROCYTE [DISTWIDTH] IN BLOOD BY AUTOMATED COUNT: 16.3 % (ref 11.5–14.5)
EST. GFR  (AFRICAN AMERICAN): >60 ML/MIN/1.73 M^2
EST. GFR  (NON AFRICAN AMERICAN): >60 ML/MIN/1.73 M^2
GLUCOSE SERPL-MCNC: 91 MG/DL (ref 70–110)
HCT VFR BLD AUTO: 24.2 % (ref 37–48.5)
HGB BLD-MCNC: 7.6 G/DL (ref 12–16)
IMM GRANULOCYTES # BLD AUTO: 0.02 K/UL (ref 0–0.04)
IMM GRANULOCYTES NFR BLD AUTO: 0.3 % (ref 0–0.5)
LYMPHOCYTES # BLD AUTO: 2.4 K/UL (ref 1–4.8)
LYMPHOCYTES NFR BLD: 34 % (ref 18–48)
MAGNESIUM SERPL-MCNC: 1.7 MG/DL (ref 1.6–2.6)
MCH RBC QN AUTO: 29.3 PG (ref 27–31)
MCHC RBC AUTO-ENTMCNC: 31.4 G/DL (ref 32–36)
MCV RBC AUTO: 93 FL (ref 82–98)
MONOCYTES # BLD AUTO: 0.8 K/UL (ref 0.3–1)
MONOCYTES NFR BLD: 11.9 % (ref 4–15)
NEUTROPHILS # BLD AUTO: 3.1 K/UL (ref 1.8–7.7)
NEUTROPHILS NFR BLD: 44.1 % (ref 38–73)
NRBC BLD-RTO: 0 /100 WBC
PHOSPHATE SERPL-MCNC: 3.3 MG/DL (ref 2.7–4.5)
PLATELET # BLD AUTO: 336 K/UL (ref 150–350)
PMV BLD AUTO: 10.7 FL (ref 9.2–12.9)
POTASSIUM SERPL-SCNC: 3.4 MMOL/L (ref 3.5–5.1)
RBC # BLD AUTO: 2.59 M/UL (ref 4–5.4)
SODIUM SERPL-SCNC: 139 MMOL/L (ref 136–145)
WBC # BLD AUTO: 7 K/UL (ref 3.9–12.7)

## 2020-02-15 PROCEDURE — 25000003 PHARM REV CODE 250: Performed by: STUDENT IN AN ORGANIZED HEALTH CARE EDUCATION/TRAINING PROGRAM

## 2020-02-15 PROCEDURE — 25000003 PHARM REV CODE 250: Performed by: OBSTETRICS & GYNECOLOGY

## 2020-02-15 PROCEDURE — 83735 ASSAY OF MAGNESIUM: CPT

## 2020-02-15 PROCEDURE — 84100 ASSAY OF PHOSPHORUS: CPT

## 2020-02-15 PROCEDURE — 80048 BASIC METABOLIC PNL TOTAL CA: CPT

## 2020-02-15 PROCEDURE — 85025 COMPLETE CBC W/AUTO DIFF WBC: CPT

## 2020-02-15 PROCEDURE — 63600175 PHARM REV CODE 636 W HCPCS: Performed by: STUDENT IN AN ORGANIZED HEALTH CARE EDUCATION/TRAINING PROGRAM

## 2020-02-15 PROCEDURE — 20600001 HC STEP DOWN PRIVATE ROOM

## 2020-02-15 RX ORDER — TRAMADOL HYDROCHLORIDE 50 MG/1
100 TABLET ORAL EVERY 4 HOURS PRN
Status: DISCONTINUED | OUTPATIENT
Start: 2020-02-15 | End: 2020-02-17 | Stop reason: HOSPADM

## 2020-02-15 RX ADMIN — TRAMADOL HYDROCHLORIDE 100 MG: 50 TABLET, FILM COATED ORAL at 09:02

## 2020-02-15 RX ADMIN — ENOXAPARIN SODIUM 40 MG: 100 INJECTION SUBCUTANEOUS at 06:02

## 2020-02-15 RX ADMIN — TRAMADOL HYDROCHLORIDE 50 MG: 50 TABLET, FILM COATED ORAL at 03:02

## 2020-02-15 RX ADMIN — TRAMADOL HYDROCHLORIDE 100 MG: 50 TABLET, FILM COATED ORAL at 05:02

## 2020-02-15 RX ADMIN — FAMOTIDINE 20 MG: 20 TABLET ORAL at 09:02

## 2020-02-15 RX ADMIN — FAMOTIDINE 20 MG: 20 TABLET ORAL at 08:02

## 2020-02-15 RX ADMIN — TRAMADOL HYDROCHLORIDE 50 MG: 50 TABLET, FILM COATED ORAL at 10:02

## 2020-02-15 RX ADMIN — MUPIROCIN 1 G: 20 OINTMENT TOPICAL at 09:02

## 2020-02-15 RX ADMIN — SULFAMETHOXAZOLE AND TRIMETHOPRIM 1 TABLET: 400; 80 TABLET ORAL at 08:02

## 2020-02-15 RX ADMIN — DOCUSATE SODIUM 100 MG: 100 CAPSULE, LIQUID FILLED ORAL at 09:02

## 2020-02-15 RX ADMIN — DOCUSATE SODIUM 100 MG: 100 CAPSULE, LIQUID FILLED ORAL at 08:02

## 2020-02-15 RX ADMIN — TRAMADOL HYDROCHLORIDE 100 MG: 50 TABLET, FILM COATED ORAL at 06:02

## 2020-02-15 RX ADMIN — LOSARTAN POTASSIUM 100 MG: 25 TABLET ORAL at 08:02

## 2020-02-15 RX ADMIN — ZOLPIDEM TARTRATE 5 MG: 5 TABLET, COATED ORAL at 09:02

## 2020-02-15 RX ADMIN — ACETAMINOPHEN 650 MG: 500 TABLET ORAL at 08:02

## 2020-02-15 RX ADMIN — POLYETHYLENE GLYCOL 3350 17 G: 17 POWDER, FOR SOLUTION ORAL at 09:02

## 2020-02-15 NOTE — ASSESSMENT & PLAN NOTE
83y/o female now POD 5 from JENA-BSO/right colectomy/para-caval node resection. She tolerated the procedure well. She is tolerating a full liquid diet and having BMs.      - Agree with advancing diet  - Consider switching to pill form of tylenol  - OOBTC and ambulation as tolerated  - Nausea meds prn   -Rest of care per primary team  -Will follow-up pathology  -Will continue to follow

## 2020-02-15 NOTE — SUBJECTIVE & OBJECTIVE
Interval History:   Reports increased discomfort overnight and yesterday afternoon. Continues to be nauseated. Not actively vomited but dry heaving. +Flatus, no further BM since diarrhea yesterday AM.      Scheduled Meds:   docusate sodium  100 mg Oral BID    enoxaparin  40 mg Subcutaneous Daily    famotidine  20 mg Oral BID    losartan  100 mg Oral Q24H    mupirocin  1 g Nasal BID    polyethylene glycol  17 g Oral Daily    scopolamine  1 patch Transdermal Q3 Days    sulfamethoxazole-trimethoprim 400-80mg  1 tablet Oral Q24H     Continuous Infusions:   dextrose 5 % and 0.45 % NaCl with KCl 20 mEq 100 mL/hr at 02/14/20 0959     PRN Meds:acetaminophen, albuterol-ipratropium, benzonatate, guaifenesin 100 mg/5 ml, hydrALAZINE, morphine, ondansetron, promethazine (PHENERGAN) IVPB, traMADol, traMADol, zolpidem    Review of patient's allergies indicates:   Allergen Reactions    Pcn [penicillins] Other (See Comments)     Patient can't remember the reaction. Reaction occurred over 60 years       Objective:     Vital Signs (Most Recent):  Temp: 98.8 °F (37.1 °C) (02/15/20 0441)  Pulse: 103 (02/15/20 0441)  Resp: 17 (02/15/20 0441)  BP: 127/71 (02/15/20 0441)  SpO2: (!) 93 % (02/15/20 0441) Vital Signs (24h Range):  Temp:  [98.4 °F (36.9 °C)-99.2 °F (37.3 °C)] 98.8 °F (37.1 °C)  Pulse:  [103-120] 103  Resp:  [17-20] 17  SpO2:  [90 %-96 %] 93 %  BP: (108-168)/(56-86) 127/71     Weight: 54.4 kg (120 lb)  Body mass index is 20.6 kg/m².    Intake/Output - Last 3 Shifts       02/13 0700 - 02/14 0659 02/14 0700 - 02/15 0659 02/15 0700 - 02/16 0659    P.O. 350 300     I.V. (mL/kg) 86.7 (1.6) 791.7 (14.6)     Total Intake(mL/kg) 436.7 (8) 1091.7 (20.1)     Net +436.7 +1091.7            Urine Occurrence 2 x 2 x     Stool Occurrence  2 x              Physical Exam:   Constitutional: She is oriented to person, place, and time. She appears well-developed and well-nourished. No distress.    HENT:   Head: Normocephalic and  atraumatic.    Eyes: Conjunctivae and EOM are normal.    Neck: Neck supple.    Cardiovascular: Normal rate and regular rhythm.     Pulmonary/Chest: Effort normal and breath sounds normal. No respiratory distress. She has no wheezes. She has no rales. She exhibits no tenderness.        Abdominal: Soft. Bowel sounds are normal. She exhibits abdominal incision (midline vertical c/d/i). She exhibits no distension. There is tenderness (approp). There is no rebound and no guarding.             Musculoskeletal: She exhibits edema (1+ pitting edema bilateral feet). She exhibits no tenderness.       Neurological: She is alert and oriented to person, place, and time.    Skin: Skin is warm and dry. No rash noted.    Psychiatric: She has a normal mood and affect.       Lines/Drains/Airways     Peripheral Intravenous Line                 Midline Catheter Insertion/Assessment  - Single Lumen 02/11/20 1510 Right brachial vein 18g x 8cm 3 days                Laboratory:  CBC:   Recent Labs   Lab 02/13/20  1346 02/14/20  0400 02/15/20  0241   WBC 11.80 9.99 7.00   HGB 8.7* 8.0* 7.6*   HCT 27.3* 25.5* 24.2*   * 426* 336       Diagnostic Results:  n/a

## 2020-02-15 NOTE — PLAN OF CARE
Patient remained AAOx3 throughout the shift. VS remained stable. All schedules/PRN medications administered as ordered. Tolerating a regular diet without any difficulty. Activity done independently. Ambulates to toilet; side rails up x2; call bell in place; bed in lowest, locked position; skid proof socks on; no evidence of skin breakdown; care plan explained to patient; no additional complaints at this time. Will continue routine plan of care.

## 2020-02-15 NOTE — PLAN OF CARE
POC reviewed with pt. Ambulating independently. C/O swelling in feet. PRN Tramadol given X 2. NO continuous fluids ordered at this time. Frequent rounds made to assess pain and safety. Patient remained free from falls. Bed in lowest position. Side rails up X 2. Call light within reach. Will continue to monitor.

## 2020-02-15 NOTE — PROGRESS NOTES
[facility]  Gynecologic Oncology  Progress Note      Patient Name: Salome Moore  MRN: [id number]  Admission Date: 2/10/2020  Hospital Length of Stay: 5 days  Attending Provider: Stephany Arredondo MD  Primary Care Provider: Ike Almeida MD  Principal Problem: S/P JENA-BSO    Follow-up For: Procedure(s) (LRB):  HYSTERECTOMY, TOTAL, ABDOMINAL (N/A)  SALPINGO-OOPHORECTOMY, BILATERAL (N/A)  DEBULKING, NEOPLASM (N/A)  LYMPHADENECTOMY, RETROPERITONEUM (Right)  COLECTOMY (Right)  LYMPHADENECTOMY, PELVIS (Right)  CYSTOSCOPY (N/A)  CATHETERIZATION, URETER (Bilateral)  Post-Operative Day: 5 Days Post-Op  Subjective:      History of Present Illness:  Salome Moore is a 84 y.o. who presents as scheduled for debulking surgery for advanced stage endometrial cancer.    Hospital Course:  02/10/2020: Underwent Ex Lap/JENA/BSO/LND for advanced stage endometrial cancer. Planned intra-operative consult with surgical oncology completed for large para-caval lymph node. 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 Right hemicolectomy also per surg onc. Received 3u pRBC intra-op (EBL 900cc). Overall patient tolerated well. See op notes for details.   2/11/2020: Slowly meeting post op milestones. CLD maintained due to nausea. +Voiding, +ambulating. Post op H/h 7.9/24.8.   2/12/2020: Diet advanced to full luquids. +Flatus.   02/13/2020: BM x 2 (diarrhea). Episode of SOB with palpitations. EKG with PVCs. CXR with questionable infiltrate vs atelectasis, suspect the latter. Doing well on RA.   2/14/2020: Intermittent tachycardia with SpO2 low 90s. CTA ordered to r/o PE. CTA neg for PE but notes moderate pleural effusions. Not requiring supplemental O2.      Interval History:   Reports increased discomfort overnight and yesterday afternoon. Continues to be nauseated. Not actively vomited but dry heaving. +Flatus, no further BM since diarrhea yesterday AM.      Scheduled  Meds:   docusate sodium  100 mg Oral BID    enoxaparin  40 mg Subcutaneous Daily    famotidine  20 mg Oral BID    losartan  100 mg Oral Q24H    mupirocin  1 g Nasal BID    polyethylene glycol  17 g Oral Daily    scopolamine  1 patch Transdermal Q3 Days    sulfamethoxazole-trimethoprim 400-80mg  1 tablet Oral Q24H     Continuous Infusions:   dextrose 5 % and 0.45 % NaCl with KCl 20 mEq 100 mL/hr at 02/14/20 0959     PRN Meds:acetaminophen, albuterol-ipratropium, benzonatate, guaifenesin 100 mg/5 ml, hydrALAZINE, morphine, ondansetron, promethazine (PHENERGAN) IVPB, traMADol, traMADol, zolpidem    Review of patient's allergies indicates:   Allergen Reactions    Pcn [penicillins] Other (See Comments)     Patient can't remember the reaction. Reaction occurred over 60 years       Objective:     Vital Signs (Most Recent):  Temp: 98.8 °F (37.1 °C) (02/15/20 0441)  Pulse: 103 (02/15/20 0441)  Resp: 17 (02/15/20 0441)  BP: 127/71 (02/15/20 0441)  SpO2: (!) 93 % (02/15/20 0441) Vital Signs (24h Range):  Temp:  [98.4 °F (36.9 °C)-99.2 °F (37.3 °C)] 98.8 °F (37.1 °C)  Pulse:  [103-120] 103  Resp:  [17-20] 17  SpO2:  [90 %-96 %] 93 %  BP: (108-168)/(56-86) 127/71     Weight: 54.4 kg (120 lb)  Body mass index is 20.6 kg/m².    Intake/Output - Last 3 Shifts       02/13 0700 - 02/14 0659 02/14 0700 - 02/15 0659 02/15 0700 - 02/16 0659    P.O. 350 300     I.V. (mL/kg) 86.7 (1.6) 791.7 (14.6)     Total Intake(mL/kg) 436.7 (8) 1091.7 (20.1)     Net +436.7 +1091.7            Urine Occurrence 2 x 2 x     Stool Occurrence  2 x              Physical Exam:   Constitutional: She is oriented to person, place, and time. She appears well-developed and well-nourished. No distress.    HENT:   Head: Normocephalic and atraumatic.    Eyes: Conjunctivae and EOM are normal.    Neck: Neck supple.    Cardiovascular: Normal rate and regular rhythm.     Pulmonary/Chest: Effort normal and breath sounds normal. No respiratory distress. She has no  wheezes. She has no rales. She exhibits no tenderness.        Abdominal: Soft. Bowel sounds are normal. She exhibits abdominal incision (midline vertical c/d/i). She exhibits no distension. There is tenderness (approp). There is no rebound and no guarding.             Musculoskeletal: She exhibits edema (1+ pitting edema bilateral feet). She exhibits no tenderness.       Neurological: She is alert and oriented to person, place, and time.    Skin: Skin is warm and dry. No rash noted.    Psychiatric: She has a normal mood and affect.       Lines/Drains/Airways     Peripheral Intravenous Line                 Midline Catheter Insertion/Assessment  - Single Lumen 02/11/20 1510 Right brachial vein 18g x 8cm 3 days                Laboratory:  CBC:   Recent Labs   Lab 02/13/20  1346 02/14/20  0400 02/15/20  0241   WBC 11.80 9.99 7.00   HGB 8.7* 8.0* 7.6*   HCT 27.3* 25.5* 24.2*   * 426* 336       Diagnostic Results:  n/a    Assessment/Plan:     * S/P JENA-BSO/Right colectomy/para-caval node resection Feb 2020  Routine post op care  - Pain control: on PO analgesics   - sched ibuprofen PO   - tramadol 50/100mg PRN   - morphine IV PRN breakthrough (not requiring >24h)  - Diet: regular, PO hydrating  - +Voiding, +bowel function  - PPx: lovenox ppx, SCDs, early ambulation, IS to bedside    Postoperative anemia  - Hct stable 24  - asymptomatic    SOB (shortness of breath)  - episode x 1 2/13  - developed tachycardia to 110s  - CTA neg PE but notes moderate pleural effusions  - on RA  - symptoms resolved    Constipation  - colace BID  - will add additional agents PRN  - not currently an issue    History of confusion  - Delirium precautions ordered  - Multimodal pain regimen to minimize opioid use  - Patient adamant about having ambien for sleep, understands risks    Endometrial cancer  - advanced disease at time of surgery  - now optimally debulked  - will need adjuvant chemotherapy after recovers    Essential  hypertension  - continue Losartan  - hydralazine 10mg IV ordered PRN SBP> 180      VTE Risk Mitigation (From admission, onward)         Ordered     enoxaparin injection 40 mg  Daily      02/10/20 1241     IP VTE HIGH RISK PATIENT  Once      02/10/20 1241                Was maravilla catheter removed? Yes    Veena Jean MD  Gynecologic Oncology  [facility]

## 2020-02-15 NOTE — PROGRESS NOTES
[facility]  General Surgery  Progress Note    Subjective:     History of Present Illness:  No notes on file    Post-Op Info:  Procedure(s) (LRB):  HYSTERECTOMY, TOTAL, ABDOMINAL (N/A)  SALPINGO-OOPHORECTOMY, BILATERAL (N/A)  DEBULKING, NEOPLASM (N/A)  LYMPHADENECTOMY, RETROPERITONEUM (Right)  COLECTOMY (Right)  LYMPHADENECTOMY, PELVIS (Right)  CYSTOSCOPY (N/A)  CATHETERIZATION, URETER (Bilateral)   5 Days Post-Op     Interval History: Pt seen and examined at bedside. Loose BM x3 overnight. Pt has been up walking with PT and feels much better.    Medications:  Continuous Infusions:   dextrose 5 % and 0.45 % NaCl with KCl 20 mEq 100 mL/hr at 02/14/20 0959     Scheduled Meds:   docusate sodium  100 mg Oral BID    enoxaparin  40 mg Subcutaneous Daily    famotidine  20 mg Oral BID    losartan  100 mg Oral Q24H    mupirocin  1 g Nasal BID    polyethylene glycol  17 g Oral Daily    scopolamine  1 patch Transdermal Q3 Days    sulfamethoxazole-trimethoprim 400-80mg  1 tablet Oral Q24H     PRN Meds:acetaminophen, albuterol-ipratropium, benzonatate, guaifenesin 100 mg/5 ml, hydrALAZINE, morphine, ondansetron, promethazine (PHENERGAN) IVPB, traMADol, traMADol, zolpidem     Review of patient's allergies indicates:   Allergen Reactions    Pcn [penicillins] Other (See Comments)     Patient can't remember the reaction. Reaction occurred over 60 years     Objective:     Vital Signs (Most Recent):  Temp: 98.3 °F (36.8 °C) (02/15/20 0802)  Pulse: 107 (02/15/20 0802)  Resp: 19 (02/15/20 0802)  BP: (!) 156/92 (02/15/20 0802)  SpO2: (!) 94 % (02/15/20 0802) Vital Signs (24h Range):  Temp:  [98.3 °F (36.8 °C)-99.2 °F (37.3 °C)] 98.3 °F (36.8 °C)  Pulse:  [103-120] 107  Resp:  [17-20] 19  SpO2:  [90 %-96 %] 94 %  BP: (108-168)/(56-92) 156/92     Weight: 54.4 kg (120 lb)  Body mass index is 20.6 kg/m².    Intake/Output - Last 3 Shifts       02/13 0700 - 02/14 0659 02/14 0700 - 02/15 0659 02/15 0700 - 02/16 0659     P.O. 350 300     I.V. (mL/kg) 86.7 (1.6) 791.7 (14.6)     Total Intake(mL/kg) 436.7 (8) 1091.7 (20.1)     Net +436.7 +1091.7            Urine Occurrence 2 x 2 x     Stool Occurrence  2 x           Physical Exam   Tachycardic  In no distress, unlabored breathing  Abdomen is soft, some mild distention, no rebound, very tender to palpation, histrionic.   Some lower extremity swelling    Significant Labs:  CBC:   Recent Labs   Lab 02/15/20  0241   WBC 7.00   RBC 2.59*   HGB 7.6*   HCT 24.2*      MCV 93   MCH 29.3   MCHC 31.4*     CMP:   Recent Labs   Lab 02/13/20  1346  02/15/20  0550      < > 91   CALCIUM 8.4*   < > 8.1*   ALBUMIN 2.9*  --   --    PROT 5.5*  --   --       < > 139   K 3.2*   < > 3.4*   CO2 19*   < > 25      < > 106   BUN 7*   < > 7*   CREATININE 0.7   < > 0.7   ALKPHOS 99  --   --    ALT 24  --   --    AST 48*  --   --    BILITOT 0.5  --   --     < > = values in this interval not displayed.       Significant Diagnostics:  None     Assessment/Plan:     * S/P JENA-BSO/Right colectomy/para-caval node resection Feb 2020  85y/o female now POD 5 from JENA-BSO/right colectomy/para-caval node resection. She tolerated the procedure well. She is tolerating a full liquid diet and having BMs.      - Agree with advancing diet  - Consider switching to pill form of tylenol  - OOBTC and ambulation as tolerated  - Nausea meds prn   -Rest of care per primary team  -Will follow-up pathology  -Will continue to follow               Lawrence Mari MD  General Surgery  [facility]

## 2020-02-15 NOTE — ASSESSMENT & PLAN NOTE
- episode x 1 2/13  - developed tachycardia to 110s  - CTA neg PE but notes moderate pleural effusions  - on RA  - symptoms resolved

## 2020-02-15 NOTE — ASSESSMENT & PLAN NOTE
Routine post op care  - Pain control: on PO analgesics   - sched ibuprofen PO   - tramadol 50/100mg PRN   - morphine IV PRN breakthrough (not requiring >24h)  - Diet: regular, PO hydrating  - +Voiding, +bowel function  - PPx: lovenox ppx, SCDs, early ambulation, IS to bedside

## 2020-02-16 PROBLEM — Z90.49 S/P RIGHT COLECTOMY: Status: ACTIVE | Noted: 2020-02-16

## 2020-02-16 LAB
ANION GAP SERPL CALC-SCNC: 11 MMOL/L (ref 8–16)
BASOPHILS # BLD AUTO: 0.08 K/UL (ref 0–0.2)
BASOPHILS NFR BLD: 0.8 % (ref 0–1.9)
BUN SERPL-MCNC: 8 MG/DL (ref 8–23)
CALCIUM SERPL-MCNC: 8.7 MG/DL (ref 8.7–10.5)
CHLORIDE SERPL-SCNC: 105 MMOL/L (ref 95–110)
CO2 SERPL-SCNC: 23 MMOL/L (ref 23–29)
CREAT SERPL-MCNC: 0.7 MG/DL (ref 0.5–1.4)
DIFFERENTIAL METHOD: ABNORMAL
EOSINOPHIL # BLD AUTO: 0.6 K/UL (ref 0–0.5)
EOSINOPHIL NFR BLD: 5.7 % (ref 0–8)
ERYTHROCYTE [DISTWIDTH] IN BLOOD BY AUTOMATED COUNT: 14.5 % (ref 11.5–14.5)
EST. GFR  (AFRICAN AMERICAN): >60 ML/MIN/1.73 M^2
EST. GFR  (NON AFRICAN AMERICAN): >60 ML/MIN/1.73 M^2
GLUCOSE SERPL-MCNC: 94 MG/DL (ref 70–110)
HCT VFR BLD AUTO: 26.3 % (ref 37–48.5)
HGB BLD-MCNC: 8.2 G/DL (ref 12–16)
IMM GRANULOCYTES # BLD AUTO: 0.04 K/UL (ref 0–0.04)
IMM GRANULOCYTES NFR BLD AUTO: 0.4 % (ref 0–0.5)
LYMPHOCYTES # BLD AUTO: 2.2 K/UL (ref 1–4.8)
LYMPHOCYTES NFR BLD: 21.7 % (ref 18–48)
MAGNESIUM SERPL-MCNC: 1.7 MG/DL (ref 1.6–2.6)
MCH RBC QN AUTO: 28.8 PG (ref 27–31)
MCHC RBC AUTO-ENTMCNC: 31.2 G/DL (ref 32–36)
MCV RBC AUTO: 92 FL (ref 82–98)
MONOCYTES # BLD AUTO: 1.1 K/UL (ref 0.3–1)
MONOCYTES NFR BLD: 11.1 % (ref 4–15)
NEUTROPHILS # BLD AUTO: 6.2 K/UL (ref 1.8–7.7)
NEUTROPHILS NFR BLD: 60.3 % (ref 38–73)
NRBC BLD-RTO: 0 /100 WBC
PHOSPHATE SERPL-MCNC: 3.9 MG/DL (ref 2.7–4.5)
PLATELET # BLD AUTO: 516 K/UL (ref 150–350)
PMV BLD AUTO: 9.4 FL (ref 9.2–12.9)
POTASSIUM SERPL-SCNC: 3.8 MMOL/L (ref 3.5–5.1)
RBC # BLD AUTO: 2.85 M/UL (ref 4–5.4)
SODIUM SERPL-SCNC: 139 MMOL/L (ref 136–145)
WBC # BLD AUTO: 10.22 K/UL (ref 3.9–12.7)

## 2020-02-16 PROCEDURE — 85025 COMPLETE CBC W/AUTO DIFF WBC: CPT

## 2020-02-16 PROCEDURE — 63600175 PHARM REV CODE 636 W HCPCS: Performed by: STUDENT IN AN ORGANIZED HEALTH CARE EDUCATION/TRAINING PROGRAM

## 2020-02-16 PROCEDURE — 99024 POSTOP FOLLOW-UP VISIT: CPT | Mod: ,,, | Performed by: OBSTETRICS & GYNECOLOGY

## 2020-02-16 PROCEDURE — 83735 ASSAY OF MAGNESIUM: CPT

## 2020-02-16 PROCEDURE — 36415 COLL VENOUS BLD VENIPUNCTURE: CPT

## 2020-02-16 PROCEDURE — 84100 ASSAY OF PHOSPHORUS: CPT

## 2020-02-16 PROCEDURE — 25000003 PHARM REV CODE 250: Performed by: STUDENT IN AN ORGANIZED HEALTH CARE EDUCATION/TRAINING PROGRAM

## 2020-02-16 PROCEDURE — 99024 PR POST-OP FOLLOW-UP VISIT: ICD-10-PCS | Mod: ,,, | Performed by: OBSTETRICS & GYNECOLOGY

## 2020-02-16 PROCEDURE — 20600001 HC STEP DOWN PRIVATE ROOM

## 2020-02-16 PROCEDURE — 80048 BASIC METABOLIC PNL TOTAL CA: CPT

## 2020-02-16 PROCEDURE — 25000003 PHARM REV CODE 250: Performed by: OBSTETRICS & GYNECOLOGY

## 2020-02-16 RX ORDER — SENNOSIDES 8.6 MG/1
8.6 TABLET ORAL DAILY
Status: DISCONTINUED | OUTPATIENT
Start: 2020-02-16 | End: 2020-02-17 | Stop reason: HOSPADM

## 2020-02-16 RX ADMIN — SCOPALAMINE 1 PATCH: 1 PATCH, EXTENDED RELEASE TRANSDERMAL at 10:02

## 2020-02-16 RX ADMIN — ONDANSETRON 8 MG: 2 INJECTION INTRAMUSCULAR; INTRAVENOUS at 11:02

## 2020-02-16 RX ADMIN — TRAMADOL HYDROCHLORIDE 50 MG: 50 TABLET, FILM COATED ORAL at 02:02

## 2020-02-16 RX ADMIN — DOCUSATE SODIUM 100 MG: 100 CAPSULE, LIQUID FILLED ORAL at 08:02

## 2020-02-16 RX ADMIN — SENNOSIDES 8.6 MG: 8.6 TABLET, FILM COATED ORAL at 08:02

## 2020-02-16 RX ADMIN — TRAMADOL HYDROCHLORIDE 50 MG: 50 TABLET, FILM COATED ORAL at 06:02

## 2020-02-16 RX ADMIN — SULFAMETHOXAZOLE AND TRIMETHOPRIM 1 TABLET: 400; 80 TABLET ORAL at 08:02

## 2020-02-16 RX ADMIN — FAMOTIDINE 20 MG: 20 TABLET ORAL at 08:02

## 2020-02-16 RX ADMIN — LOSARTAN POTASSIUM 100 MG: 25 TABLET ORAL at 08:02

## 2020-02-16 RX ADMIN — TRAMADOL HYDROCHLORIDE 100 MG: 50 TABLET, FILM COATED ORAL at 04:02

## 2020-02-16 RX ADMIN — ONDANSETRON 8 MG: 2 INJECTION INTRAMUSCULAR; INTRAVENOUS at 10:02

## 2020-02-16 RX ADMIN — TRAMADOL HYDROCHLORIDE 50 MG: 50 TABLET, FILM COATED ORAL at 08:02

## 2020-02-16 RX ADMIN — ENOXAPARIN SODIUM 40 MG: 100 INJECTION SUBCUTANEOUS at 05:02

## 2020-02-16 RX ADMIN — ZOLPIDEM TARTRATE 5 MG: 5 TABLET, COATED ORAL at 08:02

## 2020-02-16 RX ADMIN — POLYETHYLENE GLYCOL 3350 17 G: 17 POWDER, FOR SOLUTION ORAL at 08:02

## 2020-02-16 NOTE — NURSING
"Ambulating in [place] reports feeling " SOB". On assessment Sat on RA 90-91%, . BLE pedal edema. Elevated BLE extremities on 2 pillows. Refusing IVF. Notified Oncall via phone. Will come see patient. No new orders.  "

## 2020-02-16 NOTE — ASSESSMENT & PLAN NOTE
Routine post op care   - Pain control: on PO analgesics  - Diet: regular, PO hydrating  - +Voiding, +bowel function  - PPx: lovenox ppx, SCDs, early ambulation, IS to bedside  - Dispo planning

## 2020-02-16 NOTE — SUBJECTIVE & OBJECTIVE
Interval History:   Doing well this morning. Pain is controlled when she stays on a schedule. Rare nausea, no vomiting. Tolerating small amount solid food. +Bowel function. Ambulating and voiding independently. Felt a little breathless with ambulation. No CP. Not requiring supplemental O2.      Scheduled Meds:   docusate sodium  100 mg Oral BID    enoxaparin  40 mg Subcutaneous Daily    famotidine  20 mg Oral BID    losartan  100 mg Oral Q24H    polyethylene glycol  17 g Oral Daily    scopolamine  1 patch Transdermal Q3 Days    senna  8.6 mg Oral Daily    sulfamethoxazole-trimethoprim 400-80mg  1 tablet Oral Q24H     Continuous Infusions:   dextrose 5 % and 0.45 % NaCl with KCl 20 mEq 100 mL/hr at 02/14/20 0959     PRN Meds:acetaminophen, albuterol-ipratropium, benzonatate, guaifenesin 100 mg/5 ml, hydrALAZINE, morphine, ondansetron, promethazine (PHENERGAN) IVPB, traMADol, traMADol, zolpidem    Review of patient's allergies indicates:   Allergen Reactions    Pcn [penicillins] Other (See Comments)     Patient can't remember the reaction. Reaction occurred over 60 years       Objective:     Vital Signs (Most Recent):  Temp: 98.5 °F (36.9 °C) (02/16/20 0708)  Pulse: 98 (02/16/20 0708)  Resp: 19 (02/16/20 0708)  BP: (!) 147/63 (02/16/20 0708)  SpO2: 95 % (02/16/20 0708) Vital Signs (24h Range):  Temp:  [98.2 °F (36.8 °C)-99.3 °F (37.4 °C)] 98.5 °F (36.9 °C)  Pulse:  [] 98  Resp:  [16-20] 19  SpO2:  [90 %-95 %] 95 %  BP: (139-150)/(59-84) 147/63     Weight: 54.4 kg (120 lb)  Body mass index is 20.6 kg/m².    Intake/Output - Last 3 Shifts       02/14 0700 - 02/15 0659 02/15 0700 - 02/16 0659 02/16 0700 - 02/17 0659    P.O. 300      I.V. (mL/kg) 791.7 (14.6)      Total Intake(mL/kg) 1091.7 (20.1)      Net +1091.7             Urine Occurrence 2 x 3 x 1 x    Stool Occurrence 2 x               Physical Exam:   Constitutional: She is oriented to person, place, and time. She appears well-developed and  well-nourished. No distress.    HENT:   Head: Normocephalic and atraumatic.    Eyes: Conjunctivae and EOM are normal.    Neck: Neck supple.    Cardiovascular: Normal rate and regular rhythm.     Pulmonary/Chest: Effort normal and breath sounds normal. She has no wheezes. She exhibits no tenderness.        Abdominal: Soft. Bowel sounds are normal. She exhibits abdominal incision (midline vertical c/d/i). She exhibits no distension. There is no tenderness. There is no rebound and no guarding.             Musculoskeletal: She exhibits edema (2+ pitting edema bilateral feet, 1+ to bilateral knees. Non-tender and symmetric appearing. Bilateral homans neg.). She exhibits no tenderness.       Neurological: She is alert and oriented to person, place, and time.    Skin: Skin is warm and dry. No rash noted.    Psychiatric: She has a normal mood and affect.       Lines/Drains/Airways     Peripheral Intravenous Line                 Midline Catheter Insertion/Assessment  - Single Lumen 02/11/20 1510 Right brachial vein 18g x 8cm 4 days                Laboratory:  CBC:   Recent Labs   Lab 02/15/20  0241 02/16/20  0619   WBC 7.00 10.22   HGB 7.6* 8.2*   HCT 24.2* 26.3*    516*       Diagnostic Results:  n/a

## 2020-02-16 NOTE — NURSING
Tramadol 100mg given early per order from Veena Jean MD. Also, acute edema reported to Veena Jean MD. No additional orders given.

## 2020-02-16 NOTE — PROGRESS NOTES
[facility]  General Surgery  Progress Note    Subjective:     History of Present Illness:  No notes on file    Post-Op Info:  Procedure(s) (LRB):  HYSTERECTOMY, TOTAL, ABDOMINAL (N/A)  SALPINGO-OOPHORECTOMY, BILATERAL (N/A)  DEBULKING, NEOPLASM (N/A)  LYMPHADENECTOMY, RETROPERITONEUM (Right)  COLECTOMY (Right)  LYMPHADENECTOMY, PELVIS (Right)  CYSTOSCOPY (N/A)  CATHETERIZATION, URETER (Bilateral)   6 Days Post-Op     Interval History: Pt seen and examined at bedside. SHERIDAN. Pt felling fluid overloaded this morning and constipated. Pt has been up walking.     Medications:  Continuous Infusions:   dextrose 5 % and 0.45 % NaCl with KCl 20 mEq 100 mL/hr at 02/14/20 0959     Scheduled Meds:   docusate sodium  100 mg Oral BID    enoxaparin  40 mg Subcutaneous Daily    famotidine  20 mg Oral BID    losartan  100 mg Oral Q24H    polyethylene glycol  17 g Oral Daily    scopolamine  1 patch Transdermal Q3 Days    sulfamethoxazole-trimethoprim 400-80mg  1 tablet Oral Q24H     PRN Meds:acetaminophen, albuterol-ipratropium, benzonatate, guaifenesin 100 mg/5 ml, hydrALAZINE, morphine, ondansetron, promethazine (PHENERGAN) IVPB, traMADol, traMADol, zolpidem     Review of patient's allergies indicates:   Allergen Reactions    Pcn [penicillins] Other (See Comments)     Patient can't remember the reaction. Reaction occurred over 60 years     Objective:     Vital Signs (Most Recent):  Temp: 98.5 °F (36.9 °C) (02/16/20 0708)  Pulse: 98 (02/16/20 0708)  Resp: 19 (02/16/20 0708)  BP: (!) 147/63 (02/16/20 0708)  SpO2: 95 % (02/16/20 0708) Vital Signs (24h Range):  Temp:  [98.2 °F (36.8 °C)-99.3 °F (37.4 °C)] 98.5 °F (36.9 °C)  Pulse:  [] 98  Resp:  [16-20] 19  SpO2:  [90 %-95 %] 95 %  BP: (139-156)/(59-92) 147/63     Weight: 54.4 kg (120 lb)  Body mass index is 20.6 kg/m².    Intake/Output - Last 3 Shifts       02/14 0700 - 02/15 0659 02/15 0700 - 02/16 0659 02/16 0700 - 02/17 0659    P.O. 300      I.V.  (mL/kg) 791.7 (14.6)      Total Intake(mL/kg) 1091.7 (20.1)      Net +1091.7             Urine Occurrence 2 x 3 x 1 x    Stool Occurrence 2 x            Physical Exam     Tachycardic  In no distress, unlabored breathing  Abdomen is soft, some mild distention, no rebound, mildly tender to palpation RLQ, histrionic.   Some lower extremity swelling    Significant Labs:  CBC:   Recent Labs   Lab 02/16/20  0619   WBC 10.22   RBC 2.85*   HGB 8.2*   HCT 26.3*   *   MCV 92   MCH 28.8   MCHC 31.2*     CMP:   Recent Labs   Lab 02/13/20  1346  02/15/20  0550      < > 91   CALCIUM 8.4*   < > 8.1*   ALBUMIN 2.9*  --   --    PROT 5.5*  --   --       < > 139   K 3.2*   < > 3.4*   CO2 19*   < > 25      < > 106   BUN 7*   < > 7*   CREATININE 0.7   < > 0.7   ALKPHOS 99  --   --    ALT 24  --   --    AST 48*  --   --    BILITOT 0.5  --   --     < > = values in this interval not displayed.       Significant Diagnostics:  None     Assessment/Plan:     * S/P JENA-BSO/Right colectomy/para-caval node resection Feb 2020  85y/o female now POD 6 from JENA-BSO/right colectomy/para-caval node resection. She tolerated the procedure well. She is tolerating a full liquid diet and constipated this morning.       - Agree with advancing diet  - Would recommend mag citrate vs suppository for constipation  - OOBTC and ambulation as tolerated  - Nausea meds prn   - Rest of care per primary team  -Will follow-up pathology  -Will continue to follow               Lawrence Mari MD  General Surgery  [facility]

## 2020-02-16 NOTE — PLAN OF CARE
Patient AAOx4, VSS, afebrile, and without injury. Fall precautions maintained. Patient instructed on how to contact the nurse. Patient on room air without distress; occasional complaints of shortness of breath but O2 sats remain in the 93%-94% on room air. Patient on regular diet with poor appetite. Emesis x1, PRN medication zofran administered. Pain controlled with PRN. Patient up to shower this afternoon, linens changed. Patient with complaints of bilateral swelling in her feet; HOLLAND ashby applied, patient educated. Daughter at bedside this afternoon. Patient ambulating independently. Questions and concerns have been addressed; will continue to monitor.

## 2020-02-16 NOTE — OP NOTE
DATE OF PROCEDURE:  02/10/2020     Surgeon(s) and Role:  Panel 1:     * Stephany Arredondo MD - Primary     * Veena Zabala MD - Resident - Assisting     * Nela Bansal MD - Resident - Assisting  Panel 2:     * Hiren Mckeon MD - Primary  Panel 3:     * Taj Taylor MD - Primary     * Ulises Molina MD - Resident - Assisting      PREOPERATIVE DIAGNOSES:  1. 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     SPECIMENS REMOVED:  1.  Uterus   2.  Cervix  3.  Bilateral fallopian tubes and ovaries  4.  Right pelvic lymph node   5.  Right paracaval lymph node   6.  Right colon and terminal ileum  7.  Anterior peritoneum bladder nodule    ESTIMATED BLOOD LOSS:  900 mL.     FLUIDS: 2700cc cyrstalloid, 500cc albumin, 3 units RBCs    UOP: 200cc     COMPLICATIONS:  None.     FINDINGS:   Enlarged 14 week size uterus with extension of tumor involving the right adnexa and right broad ligament with further extension to the cecum and terminal ileum requiring modified radical hsyterectomy for resection. 2cm right colon mesenteric node. 3cm right obturator pelvic lymph node. 4cm right paracaval node. Normal omentum. 2cm anterior bladder peritoneum nodule. Complete resection with no gross residual disease remaining.           PROCEDURE IN DETAIL:  The patient was taken to the Operating Room.  Informed consent had  been obtained.  She underwent general endotracheal anesthesia  without difficulty and was prepped and draped in the normal sterile fashion in the dorsal lithotomy position.  Timeout was performed.  All parties agreed to the planned procedure. Perioperative antibiotics were administered.  Alan catheter was placed under sterile conditions.  Urology performed cystoscopy and placement of right ureteral catheter, please see their Midline vertical skin incision was made with a scalpel and carried down to the underlying layer of fascia. The fascia was incised in the midline and extended superiorly and inferiorly.  The rectus muscles were divided in the midline.  The peritoneum was identified, tented up with two tonsil clamps, and entered sharply with Metzenbaum scissors. The peritoneal incision was extended superiorly and inferiorly.      Findings as above. Bookwalter retractor was assembled and bowel was packed away with moist laparotomy sponges. We then identified the bilateral round ligaments, cauterized and transected them. This facilitated access to the retroperitoneum.  The retroperitoneum was then opened. The bilateral IP ligamentd were then skeletonized, doubly clamped with Rashida hysterectomy clamps, transected, and doubly suture ligated with good visualization of the ureter beneath.        The vesicouterine peritoneum was tented up, proper plane for the bladder flap was identified, and the bladder was gently reflected off the anterior aspect of the uterus and cervix to the level of the cervicovaginal junction. We further dissected the tumor extension into the right adnexa and broad ligament away from the iliac vessels. This was  from the cecum and terminal ileum which was then later resected with the surgical oncology team. The bilateral uterine arteries were then skeletonized, transected, and suture ligated. Given the tumor extension on the right a modified radical hysterectomy approach was necessary  and the right uterine artery was taken as it crossed the right ureter. The remainder of the uterosacral and cardinal ligaments were then also serially clamped, transected, and suture ligated. We then initiated anterior colpotomy and carried it around circumferentially. The uterus, cervix, bilaterally adnexa were then amputated and handed off to pathology. The vaginal cuff was closed with an 0 Vicryl suture in a running locked fashion.     We then proceeded with resection of right obturator pelvic lymph node. Paravesical and pararectal spaces were opened. The enlarged node was adherent to the external iliac vein and obturator nerve, careful dissection dividing the tissue was accomplished with sharp dissection and cautery. The node was mobilized and resected in its entirety. A small piece of surgicel nuknit was placed in the orville basin and right pelvic sidewall for hemostasis. A 2cm nodule noted on the anterior peritoneum of the bladder was noted and resected. There was no entry into the bladder serosa or mucosa, area was oversewn with 3-0 vicryl interrupted sutures.     Surgical oncology then performed their procedures as above, please see their operative note for details including right colon resection with reanastomosis and right paracaval orville resection. The abdomen was irrigated and hemostasis was obtained in the right psoas gutter and the pelvis. At this point the procedure was deemed complete.         Laparotomy sponges were removed from the abdomen.  Bookwalter retractor was taken down.  Exparel was placed superiorly and inferiorly to the fascia.  The fascia was reapproximated with a #1 looped PDS in a running fashion.  Subcutaneous tissue was irrigated, cleared of all clot and debris, and rendered hemostatic. Skin incision was closed with 4-0 Monocryl in a subcuticular fashion and topped with sterile dressing. The patient tolerated the procedure well.  Sponge, lap, needle, and instrument counts were correct  x2 as reported by the circulating nurse. She was awakened from anesthesia and taken to recovery in stable condition.

## 2020-02-16 NOTE — SUBJECTIVE & OBJECTIVE
Interval History: Pt seen and examined at bedside. SHERIDAN. Pt felling fluid overloaded this morning and constipated. Pt has been up walking.     Medications:  Continuous Infusions:   dextrose 5 % and 0.45 % NaCl with KCl 20 mEq 100 mL/hr at 02/14/20 0959     Scheduled Meds:   docusate sodium  100 mg Oral BID    enoxaparin  40 mg Subcutaneous Daily    famotidine  20 mg Oral BID    losartan  100 mg Oral Q24H    polyethylene glycol  17 g Oral Daily    scopolamine  1 patch Transdermal Q3 Days    sulfamethoxazole-trimethoprim 400-80mg  1 tablet Oral Q24H     PRN Meds:acetaminophen, albuterol-ipratropium, benzonatate, guaifenesin 100 mg/5 ml, hydrALAZINE, morphine, ondansetron, promethazine (PHENERGAN) IVPB, traMADol, traMADol, zolpidem     Review of patient's allergies indicates:   Allergen Reactions    Pcn [penicillins] Other (See Comments)     Patient can't remember the reaction. Reaction occurred over 60 years     Objective:     Vital Signs (Most Recent):  Temp: 98.5 °F (36.9 °C) (02/16/20 0708)  Pulse: 98 (02/16/20 0708)  Resp: 19 (02/16/20 0708)  BP: (!) 147/63 (02/16/20 0708)  SpO2: 95 % (02/16/20 0708) Vital Signs (24h Range):  Temp:  [98.2 °F (36.8 °C)-99.3 °F (37.4 °C)] 98.5 °F (36.9 °C)  Pulse:  [] 98  Resp:  [16-20] 19  SpO2:  [90 %-95 %] 95 %  BP: (139-156)/(59-92) 147/63     Weight: 54.4 kg (120 lb)  Body mass index is 20.6 kg/m².    Intake/Output - Last 3 Shifts       02/14 0700 - 02/15 0659 02/15 0700 - 02/16 0659 02/16 0700 - 02/17 0659    P.O. 300      I.V. (mL/kg) 791.7 (14.6)      Total Intake(mL/kg) 1091.7 (20.1)      Net +1091.7             Urine Occurrence 2 x 3 x 1 x    Stool Occurrence 2 x            Physical Exam     Tachycardic  In no distress, unlabored breathing  Abdomen is soft, some mild distention, no rebound, mildly tender to palpation RLQ, histrionic.   Some lower extremity swelling    Significant Labs:  CBC:   Recent Labs   Lab 02/16/20  0619   WBC 10.22   RBC 2.85*   HGB  8.2*   HCT 26.3*   *   MCV 92   MCH 28.8   MCHC 31.2*     CMP:   Recent Labs   Lab 02/13/20  1346  02/15/20  0550      < > 91   CALCIUM 8.4*   < > 8.1*   ALBUMIN 2.9*  --   --    PROT 5.5*  --   --       < > 139   K 3.2*   < > 3.4*   CO2 19*   < > 25      < > 106   BUN 7*   < > 7*   CREATININE 0.7   < > 0.7   ALKPHOS 99  --   --    ALT 24  --   --    AST 48*  --   --    BILITOT 0.5  --   --     < > = values in this interval not displayed.       Significant Diagnostics:  None

## 2020-02-16 NOTE — PLAN OF CARE
POC reviewed w patient at beginning of shift and PRN. VSS. Independently. Ambulates to BR; side rails up x2; call light  In reach; bed in lowest, locked position; skid proof socks on; no evidence of skin breakdown; Midline abd incision well approximated w steristrips in place. PRN pain meds given x2 doses with therapeutic effect. No BM this shift. Edema to BLE encouraging elevation of extremities.Refusing IVF. Will continue to monitor.

## 2020-02-16 NOTE — ASSESSMENT & PLAN NOTE
85y/o female now POD 6 from JENA-BSO/right colectomy/para-caval node resection. She tolerated the procedure well. She is tolerating a full liquid diet and constipated this morning.       - Agree with advancing diet  - Would recommend mag citrate vs suppository for constipation  - OOBTC and ambulation as tolerated  - Nausea meds prn   - Rest of care per primary team  -Will follow-up pathology  -Will continue to follow

## 2020-02-16 NOTE — ASSESSMENT & PLAN NOTE
- intermittent  - developed tachycardia to 110s (pt states normal for her)  - CTA neg PE but notes moderate pleural effusions  - on RA  - symptoms resolve spontaneously

## 2020-02-16 NOTE — PROGRESS NOTES
[facility]  Gynecologic Oncology  Progress Note      Patient Name: Salome Moore  MRN: [id number]  Admission Date: 2/10/2020  Hospital Length of Stay: 6 days  Attending Provider: Stephany Arredondo MD  Primary Care Provider: Ike Almeida MD  Principal Problem: S/P JENA-BSO    Follow-up For: Procedure(s) (LRB):  HYSTERECTOMY, TOTAL, ABDOMINAL (N/A)  SALPINGO-OOPHORECTOMY, BILATERAL (N/A)  DEBULKING, NEOPLASM (N/A)  LYMPHADENECTOMY, RETROPERITONEUM (Right)  COLECTOMY (Right)  LYMPHADENECTOMY, PELVIS (Right)  CYSTOSCOPY (N/A)  CATHETERIZATION, URETER (Bilateral)  Post-Operative Day: 6 Days Post-Op  Subjective:      History of Present Illness:  Salome Moore is a 84 y.o. who presents as scheduled for debulking surgery for advanced stage endometrial cancer.    Hospital Course:  02/10/2020: Underwent Ex Lap/JENA/BSO/LND for advanced stage endometrial cancer. Planned intra-operative consult with surgical oncology completed for large para-caval lymph node. 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 Right hemicolectomy also per surg onc. Received 3u pRBC intra-op (EBL 900cc). Overall patient tolerated well. See op notes for details.   2/11/2020: Slowly meeting post op milestones. CLD maintained due to nausea. +Voiding, +ambulating. Post op H/h 7.9/24.8.   2/12/2020: Diet advanced to full luquids. +Flatus.   02/13/2020: BM x 2 (diarrhea). Episode of SOB with palpitations. EKG with PVCs. CXR with questionable infiltrate vs atelectasis, suspect the latter. Doing well on RA.   2/14/2020: Intermittent tachycardia with SpO2 low 90s. CTA ordered to r/o PE. CTA neg for PE but notes moderate pleural effusions. Not requiring supplemental O2.  2/15/2020: Continues to slowly meet post op milestones. Would like to be evaluated for skilled nursing.      Interval History:   Doing well this morning. Pain is controlled when she stays on a schedule. Rare nausea, no  vomiting. Tolerating small amount solid food. +Bowel function. Ambulating and voiding independently. Felt a little breathless with ambulation. No CP. Not requiring supplemental O2.      Scheduled Meds:   docusate sodium  100 mg Oral BID    enoxaparin  40 mg Subcutaneous Daily    famotidine  20 mg Oral BID    losartan  100 mg Oral Q24H    polyethylene glycol  17 g Oral Daily    scopolamine  1 patch Transdermal Q3 Days    senna  8.6 mg Oral Daily    sulfamethoxazole-trimethoprim 400-80mg  1 tablet Oral Q24H     Continuous Infusions:   dextrose 5 % and 0.45 % NaCl with KCl 20 mEq 100 mL/hr at 02/14/20 0959     PRN Meds:acetaminophen, albuterol-ipratropium, benzonatate, guaifenesin 100 mg/5 ml, hydrALAZINE, morphine, ondansetron, promethazine (PHENERGAN) IVPB, traMADol, traMADol, zolpidem    Review of patient's allergies indicates:   Allergen Reactions    Pcn [penicillins] Other (See Comments)     Patient can't remember the reaction. Reaction occurred over 60 years       Objective:     Vital Signs (Most Recent):  Temp: 98.5 °F (36.9 °C) (02/16/20 0708)  Pulse: 98 (02/16/20 0708)  Resp: 19 (02/16/20 0708)  BP: (!) 147/63 (02/16/20 0708)  SpO2: 95 % (02/16/20 0708) Vital Signs (24h Range):  Temp:  [98.2 °F (36.8 °C)-99.3 °F (37.4 °C)] 98.5 °F (36.9 °C)  Pulse:  [] 98  Resp:  [16-20] 19  SpO2:  [90 %-95 %] 95 %  BP: (139-150)/(59-84) 147/63     Weight: 54.4 kg (120 lb)  Body mass index is 20.6 kg/m².    Intake/Output - Last 3 Shifts       02/14 0700 - 02/15 0659 02/15 0700 - 02/16 0659 02/16 0700 - 02/17 0659    P.O. 300      I.V. (mL/kg) 791.7 (14.6)      Total Intake(mL/kg) 1091.7 (20.1)      Net +1091.7             Urine Occurrence 2 x 3 x 1 x    Stool Occurrence 2 x               Physical Exam:   Constitutional: She is oriented to person, place, and time. She appears well-developed and well-nourished. No distress.    HENT:   Head: Normocephalic and atraumatic.    Eyes: Conjunctivae and EOM are normal.     Neck: Neck supple.    Cardiovascular: Normal rate and regular rhythm.     Pulmonary/Chest: Effort normal and breath sounds normal. She has no wheezes. She exhibits no tenderness.        Abdominal: Soft. Bowel sounds are normal. She exhibits abdominal incision (midline vertical c/d/i). She exhibits no distension. There is no tenderness. There is no rebound and no guarding.             Musculoskeletal: She exhibits edema (2+ pitting edema bilateral feet, 1+ to bilateral knees. Non-tender and symmetric appearing. Bilateral homans neg.). She exhibits no tenderness.       Neurological: She is alert and oriented to person, place, and time.    Skin: Skin is warm and dry. No rash noted.    Psychiatric: She has a normal mood and affect.       Lines/Drains/Airways     Peripheral Intravenous Line                 Midline Catheter Insertion/Assessment  - Single Lumen 02/11/20 1510 Right brachial vein 18g x 8cm 4 days                Laboratory:  CBC:   Recent Labs   Lab 02/15/20  0241 02/16/20  0619   WBC 7.00 10.22   HGB 7.6* 8.2*   HCT 24.2* 26.3*    516*       Diagnostic Results:  n/a    Assessment/Plan:     * S/P JENA-BSO/Right colectomy/para-caval node resection Feb 2020  Routine post op care   - Pain control: on PO analgesics  - Diet: regular, PO hydrating  - +Voiding, +bowel function  - PPx: lovenox ppx, SCDs, early ambulation, IS to bedside  - Dispo planning    S/P right colectomy  - surg onc following    Postoperative anemia  - Hct stable 26    SOB (shortness of breath)  - intermittent  - developed tachycardia to 110s (pt states normal for her)  - CTA neg PE but notes moderate pleural effusions  - on RA  - symptoms resolve spontaneously    Constipation  - colace BID  - senna added per patient request  - having BMs    History of confusion  - Delirium precautions ordered  - Multimodal pain regimen to minimize opioid use  - Patient adamant about having ambien for sleep, understands risks    Endometrial cancer  -  advanced disease at time of surgery  - now optimally debulked  - will need adjuvant chemotherapy after recovers    Essential hypertension  - continue Losartan  - hydralazine 10mg IV ordered PRN SBP> 180 (not requiring)    VTE Risk Mitigation (From admission, onward)         Ordered     enoxaparin injection 40 mg  Daily      02/10/20 1241     IP VTE HIGH RISK PATIENT  Once      02/10/20 1241                Was maravilla catheter removed? Yes    Veena Zabala MD  OB/GYN, PGY-4

## 2020-02-16 NOTE — PROGRESS NOTES
Notified Dr. Zabala, patient with complaints of bilateral lower extremity swelling to feet. Nurse educated patient regarding the use of SCDs, HOLLAND ashby as well. Nurse educated patient regarding intake of protein and nutrients as well as walking to help with the swelling. Legs are elevated while sitting. Will continue to monitor.

## 2020-02-17 ENCOUNTER — TELEPHONE (OUTPATIENT)
Dept: GYNECOLOGIC ONCOLOGY | Facility: CLINIC | Age: 85
End: 2020-02-17

## 2020-02-17 VITALS
TEMPERATURE: 99 F | SYSTOLIC BLOOD PRESSURE: 135 MMHG | WEIGHT: 120 LBS | RESPIRATION RATE: 18 BRPM | DIASTOLIC BLOOD PRESSURE: 63 MMHG | HEART RATE: 103 BPM | BODY MASS INDEX: 20.49 KG/M2 | HEIGHT: 64 IN | OXYGEN SATURATION: 95 %

## 2020-02-17 LAB
ANION GAP SERPL CALC-SCNC: 8 MMOL/L (ref 8–16)
BASOPHILS # BLD AUTO: 0.04 K/UL (ref 0–0.2)
BASOPHILS NFR BLD: 0.6 % (ref 0–1.9)
BUN SERPL-MCNC: 6 MG/DL (ref 8–23)
CALCIUM SERPL-MCNC: 8.2 MG/DL (ref 8.7–10.5)
CHLORIDE SERPL-SCNC: 103 MMOL/L (ref 95–110)
CO2 SERPL-SCNC: 28 MMOL/L (ref 23–29)
CREAT SERPL-MCNC: 0.7 MG/DL (ref 0.5–1.4)
DIFFERENTIAL METHOD: ABNORMAL
EOSINOPHIL # BLD AUTO: 0.5 K/UL (ref 0–0.5)
EOSINOPHIL NFR BLD: 7.2 % (ref 0–8)
ERYTHROCYTE [DISTWIDTH] IN BLOOD BY AUTOMATED COUNT: 14.4 % (ref 11.5–14.5)
EST. GFR  (AFRICAN AMERICAN): >60 ML/MIN/1.73 M^2
EST. GFR  (NON AFRICAN AMERICAN): >60 ML/MIN/1.73 M^2
GLUCOSE SERPL-MCNC: 91 MG/DL (ref 70–110)
HCT VFR BLD AUTO: 24 % (ref 37–48.5)
HGB BLD-MCNC: 7.4 G/DL (ref 12–16)
IMM GRANULOCYTES # BLD AUTO: 0.03 K/UL (ref 0–0.04)
IMM GRANULOCYTES NFR BLD AUTO: 0.5 % (ref 0–0.5)
LYMPHOCYTES # BLD AUTO: 1.5 K/UL (ref 1–4.8)
LYMPHOCYTES NFR BLD: 22.9 % (ref 18–48)
MAGNESIUM SERPL-MCNC: 1.6 MG/DL (ref 1.6–2.6)
MCH RBC QN AUTO: 28.6 PG (ref 27–31)
MCHC RBC AUTO-ENTMCNC: 30.8 G/DL (ref 32–36)
MCV RBC AUTO: 93 FL (ref 82–98)
MONOCYTES # BLD AUTO: 0.8 K/UL (ref 0.3–1)
MONOCYTES NFR BLD: 12.6 % (ref 4–15)
NEUTROPHILS # BLD AUTO: 3.7 K/UL (ref 1.8–7.7)
NEUTROPHILS NFR BLD: 56.2 % (ref 38–73)
NRBC BLD-RTO: 0 /100 WBC
PHOSPHATE SERPL-MCNC: 3.8 MG/DL (ref 2.7–4.5)
PLATELET # BLD AUTO: 473 K/UL (ref 150–350)
PMV BLD AUTO: 9.3 FL (ref 9.2–12.9)
POTASSIUM SERPL-SCNC: 3.2 MMOL/L (ref 3.5–5.1)
RBC # BLD AUTO: 2.59 M/UL (ref 4–5.4)
SODIUM SERPL-SCNC: 139 MMOL/L (ref 136–145)
WBC # BLD AUTO: 6.51 K/UL (ref 3.9–12.7)

## 2020-02-17 PROCEDURE — 25000003 PHARM REV CODE 250: Performed by: STUDENT IN AN ORGANIZED HEALTH CARE EDUCATION/TRAINING PROGRAM

## 2020-02-17 PROCEDURE — 63600175 PHARM REV CODE 636 W HCPCS: Performed by: STUDENT IN AN ORGANIZED HEALTH CARE EDUCATION/TRAINING PROGRAM

## 2020-02-17 PROCEDURE — 25000003 PHARM REV CODE 250: Performed by: OBSTETRICS & GYNECOLOGY

## 2020-02-17 PROCEDURE — 84100 ASSAY OF PHOSPHORUS: CPT

## 2020-02-17 PROCEDURE — 99024 PR POST-OP FOLLOW-UP VISIT: ICD-10-PCS | Mod: ,,, | Performed by: OBSTETRICS & GYNECOLOGY

## 2020-02-17 PROCEDURE — 80048 BASIC METABOLIC PNL TOTAL CA: CPT

## 2020-02-17 PROCEDURE — 83735 ASSAY OF MAGNESIUM: CPT

## 2020-02-17 PROCEDURE — 85025 COMPLETE CBC W/AUTO DIFF WBC: CPT

## 2020-02-17 PROCEDURE — 99024 POSTOP FOLLOW-UP VISIT: CPT | Mod: ,,, | Performed by: OBSTETRICS & GYNECOLOGY

## 2020-02-17 PROCEDURE — 97535 SELF CARE MNGMENT TRAINING: CPT

## 2020-02-17 PROCEDURE — 36415 COLL VENOUS BLD VENIPUNCTURE: CPT

## 2020-02-17 PROCEDURE — 97530 THERAPEUTIC ACTIVITIES: CPT

## 2020-02-17 RX ORDER — FUROSEMIDE 10 MG/ML
10 INJECTION INTRAMUSCULAR; INTRAVENOUS ONCE
Status: DISCONTINUED | OUTPATIENT
Start: 2020-02-17 | End: 2020-02-17 | Stop reason: HOSPADM

## 2020-02-17 RX ORDER — FUROSEMIDE 10 MG/ML
10 INJECTION INTRAMUSCULAR; INTRAVENOUS ONCE
Status: COMPLETED | OUTPATIENT
Start: 2020-02-17 | End: 2020-02-17

## 2020-02-17 RX ORDER — SENNOSIDES 8.6 MG/1
1 TABLET ORAL DAILY
COMMUNITY
Start: 2020-02-18

## 2020-02-17 RX ORDER — POTASSIUM CHLORIDE 20 MEQ/1
40 TABLET, EXTENDED RELEASE ORAL ONCE
Status: CANCELLED | OUTPATIENT
Start: 2020-02-17 | End: 2020-02-17

## 2020-02-17 RX ADMIN — FUROSEMIDE 10 MG: 10 INJECTION, SOLUTION INTRAMUSCULAR; INTRAVENOUS at 11:02

## 2020-02-17 RX ADMIN — ONDANSETRON 8 MG: 2 INJECTION INTRAMUSCULAR; INTRAVENOUS at 01:02

## 2020-02-17 RX ADMIN — FAMOTIDINE 20 MG: 20 TABLET ORAL at 09:02

## 2020-02-17 RX ADMIN — TRAMADOL HYDROCHLORIDE 50 MG: 50 TABLET, FILM COATED ORAL at 01:02

## 2020-02-17 NOTE — PLAN OF CARE
POC reviewed w patient at beginning of shift and PRN. VSS. Ambulates Independently. side rails up x2; call light  In reach; bed in lowest, locked position; skid proof socks on; no evidence of skin breakdown; Midline abd incision well approximated w steristrips in place. PRN pain meds given x1 dose with therapeutic effect. No BM this shift. Reports nausea refractory to scopolomine patch; resolved w 8mg IV Zofran. Edema continues to BLE encouraging elevation of extremities.Will continue to monitor.

## 2020-02-17 NOTE — SUBJECTIVE & OBJECTIVE
Interval History:   Sleeping comfortably this AM. Pain controlled on oral meds. Does complain of LE swelling. Had some nausea that resolved with ordered meds but overall tolerating PO. Ambulating, voiding, +bowel function.       Scheduled Meds:   docusate sodium  100 mg Oral BID    enoxaparin  40 mg Subcutaneous Daily    famotidine  20 mg Oral BID    losartan  100 mg Oral Q24H    polyethylene glycol  17 g Oral Daily    scopolamine  1 patch Transdermal Q3 Days    senna  8.6 mg Oral Daily    sulfamethoxazole-trimethoprim 400-80mg  1 tablet Oral Q24H     Continuous Infusions:   dextrose 5 % and 0.45 % NaCl with KCl 20 mEq 100 mL/hr at 02/14/20 0959     PRN Meds:acetaminophen, albuterol-ipratropium, benzonatate, guaifenesin 100 mg/5 ml, hydrALAZINE, morphine, ondansetron, promethazine (PHENERGAN) IVPB, traMADol, traMADol, zolpidem    Review of patient's allergies indicates:   Allergen Reactions    Pcn [penicillins] Other (See Comments)     Patient can't remember the reaction. Reaction occurred over 60 years       Objective:     Vital Signs (Most Recent):  Temp: 98.9 °F (37.2 °C) (02/17/20 0400)  Pulse: 98 (02/17/20 0400)  Resp: 18 (02/17/20 0400)  BP: (!) 156/76 (02/17/20 0400)  SpO2: (!) 91 % (02/16/20 2335) Vital Signs (24h Range):  Temp:  [98.3 °F (36.8 °C)-99.1 °F (37.3 °C)] 98.9 °F (37.2 °C)  Pulse:  [91-99] 98  Resp:  [17-19] 18  SpO2:  [91 %-95 %] 91 %  BP: (141-156)/(61-78) 156/76     Weight: 54.4 kg (120 lb)  Body mass index is 20.6 kg/m².    Intake/Output - Last 3 Shifts       02/15 0700 - 02/16 0659 02/16 0700 - 02/17 0659    P.O.  630    Total Intake(mL/kg)  630 (11.6)    Net  +630          Urine Occurrence 3 x 7 x    Stool Occurrence  2 x    Emesis Occurrence  1 x             Physical Exam:   Constitutional: She is oriented to person, place, and time. She appears well-developed and well-nourished. No distress.    HENT:   Head: Normocephalic and atraumatic.    Eyes: Conjunctivae and EOM are normal.     Neck: Neck supple.    Cardiovascular: Normal rate and regular rhythm.     Pulmonary/Chest: Effort normal and breath sounds normal. She has no wheezes. She exhibits no tenderness.        Abdominal: Soft. Bowel sounds are normal. She exhibits abdominal incision (midline vertical c/d/i). She exhibits no distension. There is no tenderness. There is no rebound and no guarding.             Musculoskeletal: She exhibits edema (2+ pitting edema bilateral feet, 1+ to bilateral knees. Non-tender and symmetric appearing. Bilateral homans neg.). She exhibits no tenderness.       Neurological: She is alert and oriented to person, place, and time.    Skin: Skin is warm and dry. No rash noted.    Psychiatric: She has a normal mood and affect.       Lines/Drains/Airways     Peripheral Intravenous Line                 Midline Catheter Insertion/Assessment  - Single Lumen 02/11/20 1510 Right brachial vein 18g x 8cm 5 days                Laboratory:  CBC:   Recent Labs   Lab 02/16/20  0619 02/17/20  0410   WBC 10.22 6.51   HGB 8.2* 7.4*   HCT 26.3* 24.0*   * 473*       Diagnostic Results:  n/a

## 2020-02-17 NOTE — SUBJECTIVE & OBJECTIVE
Interval History: No acute events. No significant clinical changes.     Medications:  Continuous Infusions:   dextrose 5 % and 0.45 % NaCl with KCl 20 mEq 100 mL/hr at 02/14/20 0959     Scheduled Meds:   docusate sodium  100 mg Oral BID    enoxaparin  40 mg Subcutaneous Daily    famotidine  20 mg Oral BID    losartan  100 mg Oral Q24H    polyethylene glycol  17 g Oral Daily    scopolamine  1 patch Transdermal Q3 Days    senna  8.6 mg Oral Daily    sulfamethoxazole-trimethoprim 400-80mg  1 tablet Oral Q24H     PRN Meds:acetaminophen, albuterol-ipratropium, benzonatate, guaifenesin 100 mg/5 ml, hydrALAZINE, morphine, ondansetron, promethazine (PHENERGAN) IVPB, traMADol, traMADol, zolpidem     Review of patient's allergies indicates:   Allergen Reactions    Pcn [penicillins] Other (See Comments)     Patient can't remember the reaction. Reaction occurred over 60 years     Objective:     Vital Signs (Most Recent):  Temp: 98.7 °F (37.1 °C) (02/17/20 1118)  Pulse: (!) 112 (02/17/20 1118)  Resp: 20 (02/17/20 1118)  BP: (!) 151/72 (02/17/20 1118)  SpO2: 96 % (02/17/20 1118) Vital Signs (24h Range):  Temp:  [98.3 °F (36.8 °C)-99.1 °F (37.3 °C)] 98.7 °F (37.1 °C)  Pulse:  [] 112  Resp:  [17-22] 20  SpO2:  [85 %-96 %] 96 %  BP: (142-161)/(61-78) 151/72     Weight: 54.4 kg (120 lb)  Body mass index is 20.6 kg/m².    Intake/Output - Last 3 Shifts       02/15 0700 - 02/16 0659 02/16 0700 - 02/17 0659 02/17 0700 - 02/18 0659    P.O.  630 220    I.V. (mL/kg)       Total Intake(mL/kg)  630 (11.6) 220 (4)    Net  +630 +220           Urine Occurrence 3 x 7 x     Stool Occurrence  2 x     Emesis Occurrence  1 x           Physical Exam   Constitutional: She is oriented to person, place, and time. She appears well-developed and well-nourished. No distress.   HENT:   Head: Normocephalic and atraumatic.   Cardiovascular: Normal rate.   Pulmonary/Chest: Effort normal.   Abdominal: Soft. There is no tenderness. There is no  rebound and no guarding.   Well-healing midline incision   Neurological: She is alert and oriented to person, place, and time.   Skin: Skin is warm and dry.   Psychiatric: She has a normal mood and affect.       Significant Labs:  CBC:   Recent Labs   Lab 02/17/20  0410   WBC 6.51   RBC 2.59*   HGB 7.4*   HCT 24.0*   *   MCV 93   MCH 28.6   MCHC 30.8*     CMP:   Recent Labs   Lab 02/13/20  1346  02/17/20  0410      < > 91   CALCIUM 8.4*   < > 8.2*   ALBUMIN 2.9*  --   --    PROT 5.5*  --   --       < > 139   K 3.2*   < > 3.2*   CO2 19*   < > 28      < > 103   BUN 7*   < > 6*   CREATININE 0.7   < > 0.7   ALKPHOS 99  --   --    ALT 24  --   --    AST 48*  --   --    BILITOT 0.5  --   --     < > = values in this interval not displayed.       Significant Diagnostics:  None

## 2020-02-17 NOTE — ASSESSMENT & PLAN NOTE
85y/o female now 7 Days Post-Op  from [facility]-BSO/right colectomy/para-caval node resection. She tolerated the procedure well.        - Reg diet  - OOBTC and ambulation as tolerated  - Nausea meds prn   - Rest of care per primary team  - Will follow-up pathology  - Will continue to follow

## 2020-02-17 NOTE — PT/OT/SLP PROGRESS
Occupational Therapy   Treatment    Name: Salome Moore  MRN: [id number]  Admitting Diagnosis:  S/P JENA-BSO  7 Days Post-Op    Recommendations:     Discharge Recommendations: home with home health  Discharge Equipment Recommendations:  shower chair  Barriers to discharge:  Decreased caregiver support    Assessment:     Salome Moore is a 84 y.o. female with a medical diagnosis of S/P JENA-BSO.  She presents supine and prepared or daily self care performance.  Min A for lower extremity dressing but pt with potential for indep performance.  Tolilting on BSC with CGA for stand pivot transfer  Performance deficits affecting function are impaired endurance.     Rehab Prognosis:  Good; patient would benefit from acute skilled OT services to address these deficits and reach maximum level of function.       Plan:     Patient to be seen 2 x/week to address the above listed problems via self-care/home management, therapeutic activities, therapeutic exercises  · Plan of Care Expires:    · Plan of Care Reviewed with: patient    Subjective     Pain/Comfort:  · Pain Rating 1: 0/10    Objective:     Communicated with: RN prior to session.  Patient found supine with peripheral IV upon OT entry to room.    General Precautions: Standard, fall   Orthopedic Precautions:N/A   Braces: N/A     Occupational Performance:     Bed Mobility:    · Patient completed Rolling/Turning to Left with  contact guard assistance  · Patient completed Rolling/Turning to Right with contact guard assistance  · Patient completed Scooting/Bridging with contact guard assistance  · Patient completed Supine to Sit with contact guard assistance  · Patient completed Sit to Supine with contact guard assistance     Functional Mobility/Transfers:  · Patient completed Sit <> Stand Transfer with contact guard assistance  with  no assistive device and hand-held assist   · Patient completed Bed <> Chair Transfer using Stand Pivot technique with contact  guard assistance with hand-held assist  · Patient completed Toilet Transfer Stand Pivot technique with contact guard assistance with  hand-held assist    Activities of Daily Living:  · Upper Body Dressing: independence    · Lower Body Dressing: contact guard assistance    · Toileting: minimum assistance        [facility] 6 Click ADL: 22    Treatment & Education:  Pt educated on safety, role of OT, importance of increased participation in self care for gains , expectations for participation, expectations for gains, POC, energy conservation, caregiver strain. White board updated.   - ADL training  - bed mobility training     Patient left supine with all lines intactEducation:      GOALS:   Multidisciplinary Problems     Occupational Therapy Goals        Problem: Occupational Therapy Goal    Goal Priority Disciplines Outcome Interventions   Occupational Therapy Goal     OT, PT/OT Ongoing, Progressing    Description:  Goals to be met by: 3/5    Patient will increase functional independence with ADLs by performing:    UE Dressing with [place].  LE Dressing with [place].  Grooming while seated with [place].  Toileting from toilet with Supervision for hygiene and clothing management.   Bathing from  edge of bed with Set-up Assistance.                      Time Tracking:     OT Date of Treatment: 02/17/20  OT Start Time: 1000  OT Stop Time: 1025  OT Total Time (min): 25 min    Billable Minutes:Self Care/Home Management 13  Therapeutic Activity 12    Ladonna Quiles, OT  2/17/2020

## 2020-02-17 NOTE — PROGRESS NOTES
[facility]  General Surgery  Progress Note    Subjective:     History of Present Illness:  No notes on file    Post-Op Info:  Procedure(s) (LRB):  HYSTERECTOMY, TOTAL, ABDOMINAL (N/A)  SALPINGO-OOPHORECTOMY, BILATERAL (N/A)  DEBULKING, NEOPLASM (N/A)  LYMPHADENECTOMY, RETROPERITONEUM (Right)  COLECTOMY (Right)  LYMPHADENECTOMY, PELVIS (Right)  CYSTOSCOPY (N/A)  CATHETERIZATION, URETER (Bilateral)   7 Days Post-Op     Interval History: No acute events. No significant clinical changes.     Medications:  Continuous Infusions:   dextrose 5 % and 0.45 % NaCl with KCl 20 mEq 100 mL/hr at 02/14/20 0959     Scheduled Meds:   docusate sodium  100 mg Oral BID    enoxaparin  40 mg Subcutaneous Daily    famotidine  20 mg Oral BID    losartan  100 mg Oral Q24H    polyethylene glycol  17 g Oral Daily    scopolamine  1 patch Transdermal Q3 Days    senna  8.6 mg Oral Daily    sulfamethoxazole-trimethoprim 400-80mg  1 tablet Oral Q24H     PRN Meds:acetaminophen, albuterol-ipratropium, benzonatate, guaifenesin 100 mg/5 ml, hydrALAZINE, morphine, ondansetron, promethazine (PHENERGAN) IVPB, traMADol, traMADol, zolpidem     Review of patient's allergies indicates:   Allergen Reactions    Pcn [penicillins] Other (See Comments)     Patient can't remember the reaction. Reaction occurred over 60 years     Objective:     Vital Signs (Most Recent):  Temp: 98.7 °F (37.1 °C) (02/17/20 1118)  Pulse: (!) 112 (02/17/20 1118)  Resp: 20 (02/17/20 1118)  BP: (!) 151/72 (02/17/20 1118)  SpO2: 96 % (02/17/20 1118) Vital Signs (24h Range):  Temp:  [98.3 °F (36.8 °C)-99.1 °F (37.3 °C)] 98.7 °F (37.1 °C)  Pulse:  [] 112  Resp:  [17-22] 20  SpO2:  [85 %-96 %] 96 %  BP: (142-161)/(61-78) 151/72     Weight: 54.4 kg (120 lb)  Body mass index is 20.6 kg/m².    Intake/Output - Last 3 Shifts       02/15 0700 - 02/16 0659 02/16 0700 - 02/17 0659 02/17 0700 - 02/18 0659    P.O.  630 220    I.V. (mL/kg)       Total Intake(mL/kg)   630 (11.6) 220 (4)    Net  +630 +220           Urine Occurrence 3 x 7 x     Stool Occurrence  2 x     Emesis Occurrence  1 x           Physical Exam   Constitutional: She is oriented to person, place, and time. She appears well-developed and well-nourished. No distress.   HENT:   Head: Normocephalic and atraumatic.   Cardiovascular: Normal rate.   Pulmonary/Chest: Effort normal.   Abdominal: Soft. There is no tenderness. There is no rebound and no guarding.   Well-healing midline incision   Neurological: She is alert and oriented to person, place, and time.   Skin: Skin is warm and dry.   Psychiatric: She has a normal mood and affect.       Significant Labs:  CBC:   Recent Labs   Lab 02/17/20  0410   WBC 6.51   RBC 2.59*   HGB 7.4*   HCT 24.0*   *   MCV 93   MCH 28.6   MCHC 30.8*     CMP:   Recent Labs   Lab 02/13/20  1346  02/17/20  0410      < > 91   CALCIUM 8.4*   < > 8.2*   ALBUMIN 2.9*  --   --    PROT 5.5*  --   --       < > 139   K 3.2*   < > 3.2*   CO2 19*   < > 28      < > 103   BUN 7*   < > 6*   CREATININE 0.7   < > 0.7   ALKPHOS 99  --   --    ALT 24  --   --    AST 48*  --   --    BILITOT 0.5  --   --     < > = values in this interval not displayed.       Significant Diagnostics:  None    Assessment/Plan:     * S/P JENA-BSO/Right colectomy/para-caval node resection Feb 2020  85y/o female now 7 Days Post-Op  from JENA-BSO/right colectomy/para-caval node resection. She tolerated the procedure well.        - Reg diet  - OOBTC and ambulation as tolerated  - Nausea meds prn   - Rest of care per primary team  - Will follow-up pathology  - Will continue to follow               Checo Glover MD  General Surgery  [facility]

## 2020-02-17 NOTE — PLAN OF CARE
Goals addressed.  Pt approaching baseline and will benefit from home health services.   Ladonna Quiles, OT  2/17/2020    Problem: Occupational Therapy Goal  Goal: Occupational Therapy Goal  Description  Goals to be met by: 3/5    Patient will increase functional independence with ADLs by performing:    UE Dressing with [place].  LE Dressing with [place].  Grooming while seated with [place].  Toileting from toilet with Supervision for hygiene and clothing management.   Bathing from  edge of bed with Set-up Assistance.     Outcome: Ongoing, Progressing

## 2020-02-17 NOTE — NURSING
Discharge instructions and prescriptions given and explained to pt.  Pt. verbalized understanding with no further questions.  Midline D/C'd with catheter tip intact.  VS WDL.  Patient is awaiting transport    ROAD TEST  O=SpO2 94% on RA  A=Ambulating around room and hallway  D=Midline  T=Tolerating regular diet  E=Voids independently  S=Performs self care independently  T=Teaching on meds complete

## 2020-02-17 NOTE — PROGRESS NOTES
[facility]  Gynecologic Oncology  Progress Note      Patient Name: Salome Moore  MRN: [id number]  Admission Date: 2/10/2020  Hospital Length of Stay: 7 days  Attending Provider: Stephany Arredondo MD  Primary Care Provider: Ike Almeida MD  Principal Problem: S/P JENA-BSO    Follow-up For: Procedure(s) (LRB):  HYSTERECTOMY, TOTAL, ABDOMINAL (N/A)  SALPINGO-OOPHORECTOMY, BILATERAL (N/A)  DEBULKING, NEOPLASM (N/A)  LYMPHADENECTOMY, RETROPERITONEUM (Right)  COLECTOMY (Right)  LYMPHADENECTOMY, PELVIS (Right)  CYSTOSCOPY (N/A)  CATHETERIZATION, URETER (Bilateral)  Post-Operative Day: 7 Days Post-Op  Subjective:      History of Present Illness:  Salome Moore is a 84 y.o. who presents as scheduled for debulking surgery for advanced stage endometrial cancer.    Hospital Course:  02/10/2020: Underwent Ex Lap/JENA/BSO/LND for advanced stage endometrial cancer. Planned intra-operative consult with surgical oncology completed for large para-caval lymph node. 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 Right hemicolectomy also per surg onc. Received 3u pRBC intra-op (EBL 900cc). Overall patient tolerated well. See op notes for details.   2/11/2020: Slowly meeting post op milestones. CLD maintained due to nausea. +Voiding, +ambulating. Post op H/h 7.9/24.8.   2/12/2020: Diet advanced to full luquids. +Flatus.   02/13/2020: BM x 2 (diarrhea). Episode of SOB with palpitations. EKG with PVCs. CXR with questionable infiltrate vs atelectasis, suspect the latter. Doing well on RA.   2/14/2020: Intermittent tachycardia with SpO2 low 90s. CTA ordered to r/o PE. CTA neg for PE but notes moderate pleural effusions. Not requiring supplemental O2.  2/15/2020: Continues to slowly meet post op milestones. Would like to be evaluated for skilled nursing.      Interval History:   Sleeping comfortably this AM. Pain controlled on oral meds. Does complain of LE swelling.  Had some nausea that resolved with ordered meds but overall tolerating PO. Ambulating, voiding, +bowel function.       Scheduled Meds:   docusate sodium  100 mg Oral BID    enoxaparin  40 mg Subcutaneous Daily    famotidine  20 mg Oral BID    losartan  100 mg Oral Q24H    polyethylene glycol  17 g Oral Daily    scopolamine  1 patch Transdermal Q3 Days    senna  8.6 mg Oral Daily    sulfamethoxazole-trimethoprim 400-80mg  1 tablet Oral Q24H     Continuous Infusions:   dextrose 5 % and 0.45 % NaCl with KCl 20 mEq 100 mL/hr at 02/14/20 0959     PRN Meds:acetaminophen, albuterol-ipratropium, benzonatate, guaifenesin 100 mg/5 ml, hydrALAZINE, morphine, ondansetron, promethazine (PHENERGAN) IVPB, traMADol, traMADol, zolpidem    Review of patient's allergies indicates:   Allergen Reactions    Pcn [penicillins] Other (See Comments)     Patient can't remember the reaction. Reaction occurred over 60 years       Objective:     Vital Signs (Most Recent):  Temp: 98.9 °F (37.2 °C) (02/17/20 0400)  Pulse: 98 (02/17/20 0400)  Resp: 18 (02/17/20 0400)  BP: (!) 156/76 (02/17/20 0400)  SpO2: (!) 91 % (02/16/20 2335) Vital Signs (24h Range):  Temp:  [98.3 °F (36.8 °C)-99.1 °F (37.3 °C)] 98.9 °F (37.2 °C)  Pulse:  [91-99] 98  Resp:  [17-19] 18  SpO2:  [91 %-95 %] 91 %  BP: (141-156)/(61-78) 156/76     Weight: 54.4 kg (120 lb)  Body mass index is 20.6 kg/m².    Intake/Output - Last 3 Shifts       02/15 0700 - 02/16 0659 02/16 0700 - 02/17 0659    P.O.  630    Total Intake(mL/kg)  630 (11.6)    Net  +630          Urine Occurrence 3 x 7 x    Stool Occurrence  2 x    Emesis Occurrence  1 x             Physical Exam:   Constitutional: She is oriented to person, place, and time. She appears well-developed and well-nourished. No distress.    HENT:   Head: Normocephalic and atraumatic.    Eyes: Conjunctivae and EOM are normal.    Neck: Neck supple.    Cardiovascular: Normal rate and regular rhythm.     Pulmonary/Chest: Effort normal  and breath sounds normal. She has no wheezes. She exhibits no tenderness.        Abdominal: Soft. Bowel sounds are normal. She exhibits abdominal incision (midline vertical c/d/i). She exhibits no distension. There is no tenderness. There is no rebound and no guarding.             Musculoskeletal: She exhibits edema (2+ pitting edema bilateral feet, 1+ to bilateral knees. Non-tender and symmetric appearing. Bilateral homans neg.). She exhibits no tenderness.       Neurological: She is alert and oriented to person, place, and time.    Skin: Skin is warm and dry. No rash noted.    Psychiatric: She has a normal mood and affect.       Lines/Drains/Airways     Peripheral Intravenous Line                 Midline Catheter Insertion/Assessment  - Single Lumen 02/11/20 1510 Right brachial vein 18g x 8cm 5 days                Laboratory:  CBC:   Recent Labs   Lab 02/16/20  0619 02/17/20  0410   WBC 10.22 6.51   HGB 8.2* 7.4*   HCT 26.3* 24.0*   * 473*       Diagnostic Results:  n/a    Assessment/Plan:     * S/P JENA-BSO/Right colectomy/para-caval node resection Feb 2020  Routine post op care   - Pain control: on PO analgesics  - Diet: regular, PO hydrating  - +Voiding, +bowel function  - PPx: lovenox ppx, SCDs, early ambulation, IS to bedside  - Dispo planning-- would like to be evaluated for skilled nursing   - Patient understands d/c today either to SNF or home with Home Health    S/P right colectomy  - surg onc following    Postoperative anemia  - Hct stable 26    SOB (shortness of breath)  - intermittent  - developed tachycardia to 110s (pt states normal for her)  - CTA neg PE but notes moderate pleural effusions  - on RA  - symptoms resolve spontaneously    Constipation  - colace BID  - senna added per patient request  - having BMs    History of confusion  - Delirium precautions ordered  - Multimodal pain regimen to minimize opioid use  - Patient adamant about having ambien for sleep, understands  risks    Endometrial cancer  - advanced disease at time of surgery  - now optimally debulked  - will need adjuvant chemotherapy after recovers    Essential hypertension  - continue Losartan  - hydralazine 10mg IV ordered PRN SBP> 180 (not requiring)      VTE Risk Mitigation (From admission, onward)         Ordered     enoxaparin injection 40 mg  Daily      02/10/20 1241     IP VTE HIGH RISK PATIENT  Once      02/10/20 1241                Was maravilla catheter removed? Yes    Veena Jean MD  Gynecologic Oncology  [facility]

## 2020-02-17 NOTE — PROGRESS NOTES
Admit Assessment    Patient Identification  Salome Moore   :  1935  Admit Date:  2/10/2020  Attending Provider:  No att. providers found              Referral:   Pt was admitted to  with a diagnosis of S/P JENA-BSO, and was admitted this hospital stay due to Endometrial cancer [C54.1]  Endometrial cancer [C54.1]  S/P JENA (total abdominal hysterectomy) [Z90.710].   is involved was referred to the Social Work Department via routine referral.  Patient presents as a 84 y.o. year old single female.    Persons interviewed: patient    Living Situation:  Pt. States that she resides at home alone. She states that she has been very independent with all adl's prior to admission. She states that she has 2 daughters that live nearby and can assist with needs. She states that she also has a network of friends that she plays cards with on a regular basis.     Resides at [address] [place] [address] [facility] [address], phone: [phone] (home).      (RETIRED) Functional Status Prior  Ambulation Prior: 0-->independent  Transferrin-->independent  Toiletin-->independent    Current or Past Agencies and Description of Services/Supplies    DME  Agency Name: none  Agency Phone Number: n/a  Equipment Currently Used at Home: none    Home Health  Agency Name: none  Agency Phone Number: n/a  Services: n/a    IV Infusion  Agency Name: none  Agency Phone Number: n/a    Nutrition: oral    Outpatient Pharmacy:     [facility] - [place], [place] - [address]  [address]  [facility] [address]  Phone: [phone] Fax: [phone]    [id number] - [place], [place] - [address] [facility]  [address] [facility] [address]  Phone: [phone] Fax: [phone]      Patient Preference of agencies include: none noted    Patient/Caregiver informed of right to choose providers or agencies.  Patient provides permission to release  any necessary information to [facility] and to [facility] agencies as needed to facilitate patient care, treatment planning, and patient discharge planning.  Written and verbal resources provided.      Coping: pt. States that she is very worried about recovery from recent surgery and how she will care for herself. She states that her daughters are very supportive but they work daily and are limited in help that they can give.           Adjustment to Diagnosis and Treatment: appropriate considering recent diagnosis      Emotional/Behavioral/Cognitive Issues: none noted            History/Current Symptoms of Anxiety/Depression: No:   History/Current Substance Use:   Social History     Tobacco Use    Smoking status: Never Smoker    Smokeless tobacco: Never Used   Substance and Sexual Activity    Alcohol use: No     Frequency: Never    Drug use: No    Sexual activity: Not Currently     Partners: Male       Indications of Abuse/Neglect: No:   Abuse Screen (yes response referral indicated)  Feels Unsafe at Home or Work/School: no  Feels Threatened by Someone: no  Does Anyone Try to Keep You From Having Contact with Others or Doing Things Outside Your Home?: no  Physical Signs of Abuse Present: no    Financial:  Payor/Plan Subscr  Sex Relation Sub. Ins. ID Effective Group Num   1. MARTINAJANEY AMIN* MARTHA DIAZ* 1935 Female  [id number]                                   [address]   2. MARTINAS BRENNAN* MARTHA DIAZ* 1935 Female  [id number] 12/23/15                                    [address]                            Other identified concerns/needs: SNF vs HH upon dc    Plan: TBD    Interventions/Referrals: TBD  Patient/caregiver engaged in treatment planning process.     providing psychosocial and supportive counseling, resources, education, assistance and discharge planning as appropriate.  Patient/caregiver state understanding of  available resources,   following, remains available. Provided pt. With sw'er phone # and encouraged her to call if needed. Will follow.

## 2020-02-17 NOTE — ASSESSMENT & PLAN NOTE
Routine post op care   - Pain control: on PO analgesics  - Diet: regular, PO hydrating  - +Voiding, +bowel function  - PPx: lovenox ppx, SCDs, early ambulation, IS to bedside  - Dispo planning-- would like to be evaluated for skilled nursing   - Patient understands d/c today either to SNF or home with Home Health

## 2020-02-18 ENCOUNTER — TELEPHONE (OUTPATIENT)
Dept: GYNECOLOGIC ONCOLOGY | Facility: CLINIC | Age: 85
End: 2020-02-18

## 2020-02-18 ENCOUNTER — NURSE TRIAGE (OUTPATIENT)
Dept: ADMINISTRATIVE | Facility: CLINIC | Age: 85
End: 2020-02-18

## 2020-02-18 DIAGNOSIS — C54.1 ENDOMETRIAL CANCER: Primary | ICD-10-CM

## 2020-02-18 RX ORDER — LORAZEPAM 1 MG/1
1 TABLET ORAL EVERY 6 HOURS PRN
Qty: 30 TABLET | Refills: 0 | Status: SHIPPED | OUTPATIENT
Start: 2020-02-18 | End: 2020-03-19

## 2020-02-18 NOTE — PROGRESS NOTES
Call received from N this am informing that they have coordinated HH thru [facility] ([phone]). Contacted [facility] and spoke with Kierra, all necessary information given and orders faxed to the office ([phone]). [facility] to reach out to patient to inform of referral and when HH nurse will be out to see the patient. Spoke with pt and informed of the above, pt. Very anxious as she feels that she was discharged too soon and is upset that she has not seen HH nurse this am. Explained to pt. How referral process works and that she should hear from HH this am. Pt. Verbalized understanding and is in agreement with the plan.

## 2020-02-18 NOTE — TELEPHONE ENCOUNTER
Reason for Disposition   Severe difficulty breathing (e.g., struggling for each breath, speaks in single words)    Protocols used: ANXIETY AND PANIC ATTACK-A-OH    Pt called stated she can't breath. Pt stated she was having panic breaths and she was scared because she can't catch her breath. Pt stated she was suppose to have home health but no one has contacted her.  Stephania Frank SW notified and stated she would contact patient. Unable to calm pt. Pt stated she still felt like she was struggling to breath. Care advice recommended pt call 911. Pt verbalized understanding. Called pt daughter to inform her what was going on.

## 2020-02-18 NOTE — PROGRESS NOTES
Referral for  authorization faxed to [facility] ([phone]). Will await response from [facility] re; name of agency. Will follow.

## 2020-02-18 NOTE — PROGRESS NOTES
Call received from pts. Daughter (Kasia [phone]) inquiring about dc needs. Daughters feels that pt. Needs SNF placement and does not feel that pt. Can go home at this time as she has limited support. Daughters work daily and feel that they cannot provide all the care that she needs. Explained to pts. Daughter that CM contacted [facility] and spoke with nurse  and was informed that based on PT/OT notes pt. Would not qualify for SNF and that insurance would not approve for SNF. Pt. However would be eligible for home health at [place]. Daughter expresses understanding. Will make referral to [facility] for HH authorization. Will follow.

## 2020-02-18 NOTE — TELEPHONE ENCOUNTER
Spoke with pt. States she has been having shortness of breath related to anxiety attacks. Advised pt to call 911 or go to the ED if shortness of breath continues. Pt verbalized understanding. Pt states her left side of abdomen is painful from surgery. States her right leg is still sore- but confirmed the pain was present before recent procedure. Pt requesting something to take for anxiety. Informed pt I would talk with Dr Arredondo and call her back. Pt verbalized understanding.

## 2020-02-18 NOTE — DISCHARGE SUMMARY
[facility]  Gynecologic Oncology  Discharge Summary    Patient Name: Salome Moore  MRN: [id number]  Admission Date: 2/10/2020  Hospital Length of Stay: 7 days  Discharge Date and Time: 2/17/2020  4:48 PM  Attending Physician: No att. providers found   Discharging Provider: Veena Jean MD  Primary Care Provider: Ike Almeida MD    HPI:   Salome Moore is a 84 y.o. who presents as scheduled for debulking surgery for advanced stage endometrial cancer.    Hospital Course:  02/10/2020: Underwent Ex Lap/JENA/BSO/LND for advanced stage endometrial cancer. Planned intra-operative consult with surgical oncology completed for large para-caval lymph node. 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 Right hemicolectomy also per surg onc. Received 3u pRBC intra-op (EBL 900cc). Overall patient tolerated well. See op notes for details.   2/11/2020: Slowly meeting post op milestones. CLD maintained due to nausea. +Voiding, +ambulating. Post op H/h 7.9/24.8.   2/12/2020: Diet advanced to full luquids. +Flatus.   02/13/2020: BM x 2 (diarrhea). Episode of SOB with palpitations. EKG with PVCs. CXR with questionable infiltrate vs atelectasis, suspect the latter. Doing well on RA.   2/14/2020: Intermittent tachycardia with SpO2 low 90s. CTA ordered to r/o PE. CTA neg for PE but notes moderate pleural effusions. Not requiring supplemental O2.  2/15/2020: Continues to meet post op milestones. Would like to be evaluated for skilled nursing.  02/17/2020:  Patient not accepted by SNF. Home health with PT/OT and nurse visits arranged.     On discharge day patient had episode of hypoxia to 88% requiring 2L NC. Known to have bilateral pleural effusions and some mild edema on CTA. Lasix administered with reported good response (patient adamantly refuses to void in hat for measurement). Able to wean off supplemental oxygen several hours after Lasix with SpO2  95% on room air. Patient OK to discharge.  Follow up with Dr. Arredondo.       Procedure(s) (LRB):  HYSTERECTOMY, TOTAL, ABDOMINAL (N/A)  SALPINGO-OOPHORECTOMY, BILATERAL (N/A)  DEBULKING, NEOPLASM (N/A)  LYMPHADENECTOMY, RETROPERITONEUM (Right)  COLECTOMY (Right)  LYMPHADENECTOMY, PELVIS (Right)  CYSTOSCOPY (N/A)  CATHETERIZATION, URETER (Bilateral)     Consults (From admission, onward)        Status Ordering Provider     [facility]  Once     Provider:  (Not yet assigned)    Completed GAVI GONZALES          Significant Diagnostic Studies: Labs:   BMP:   Recent Labs   Lab 02/17/20 0410   GLU 91      K 3.2*      CO2 28   BUN 6*   CREATININE 0.7   CALCIUM 8.2*   MG 1.6    and CBC   Recent Labs   Lab 02/17/20 0410   WBC 6.51   HGB 7.4*   HCT 24.0*   *       Pending Diagnostic Studies:     Procedure Component Value Units Date/Time    Basic metabolic panel [[id number]] Collected:  02/16/20 0435    Order Status:  Sent Lab Status:  No result     Specimen:  Blood     CBC auto differential [[id number]] Collected:  02/16/20 0435    Order Status:  Sent Lab Status:  No result     Specimen:  Blood     Magnesium [[id number]] Collected:  02/16/20 0435    Order Status:  Sent Lab Status:  No result     Specimen:  Blood     Phosphorus [[id number]] Collected:  02/16/20 0435    Order Status:  Sent Lab Status:  No result     Specimen:  Blood     Specimen to Pathology, Surgery Gynecology and Obstetrics [[id number]] Collected:  02/10/20 1237    Order Status:  Sent Lab Status:  In process Updated:  02/10/20 1511        Final Active Diagnoses:    Diagnosis Date Noted POA    PRINCIPAL PROBLEM:  S/P JENA-BSO/Right colectomy/para-caval node resection Feb 2020 [Z90.710, Z90.722, Z90.79] 02/10/2020 Not Applicable    S/P right colectomy [Z90.49] 02/16/2020 Not Applicable    Postoperative anemia [D64.9] 02/11/2020 No    S/P JENA (total abdominal hysterectomy) [Z90.710] 02/10/2020 Yes    History of  confusion [Z87.898] 02/05/2020 Not Applicable    Constipation [K59.00] 02/05/2020 Yes    SOB (shortness of breath) [R06.02] 02/05/2020 Yes    Endometrial cancer [C54.1] 01/10/2020 Yes    Essential hypertension [I10] 01/24/2019 Yes      Problems Resolved During this Admission:        Does this patient meet criteria for extended DVT prophylaxis? Yes, patient was prescribed Lovenox to complete 21 days    Discharged Condition: good    [place] [facility]    Follow Up:  Follow-up Information     Stephany Arredondo MD. Go on 3/3/2020.    Specialty:  Gynecologic Oncology  Why:  Postoperative appointment  Contact information:  Bal TOLENTINO TAHIR  [facility] [id number] [phone]                 Patient Instructions:      Diet Adult Regular     Pelvic Rest     Lifting restrictions   Order Comments: No more than 5lb     No driving until:   Order Comments: Minimum 2 weeks. No longer taking narcotics and not having pain with quick movements.     Notify your health care provider if you experience any of the following:  temperature >100.4     Notify your health care provider if you experience any of the following:  persistent nausea and vomiting or diarrhea     Notify your health care provider if you experience any of the following:  severe uncontrolled pain     Notify your health care provider if you experience any of the following:  redness, tenderness, or signs of infection (pain, swelling, redness, odor or green/yellow discharge around incision site)     Notify your health care provider if you experience any of the following:  difficulty breathing or increased cough     Notify your health care provider if you experience any of the following:  severe persistent headache     Notify your health care provider if you experience any of the following:  worsening rash     Notify your health care provider if you experience any of the following:  persistent dizziness, light-headedness, or visual disturbances      Notify your health care provider if you experience any of the following:  increased confusion or weakness     Notify your health care provider if you experience any of the following:   Order Comments: Very heavy vaginal bleeding that does not stop (changing pad once an hour)     EKG 12-lead   Standing Status: Future Number of Occurrences: 1 Standing Exp. Date: 01/23/21     Order Specific Question Answer Comments   Diagnosis Hypertension [846595]      Type & Screen   Standing Status: Future Number of Occurrences: 1 Standing Exp. Date: 03/23/21     Activity as tolerated     Medications:  Reconciled Home Medications:      Medication List      START taking these medications    enoxaparin 40 mg/0.4 mL Syrg  Commonly known as:  LOVENOX  Inject 0.4 mLs (40 mg total) into the skin once daily. for 21 days     senna 8.6 mg tablet  Commonly known as:  SENOKOT  Take 1 tablet by mouth once daily.        CHANGE how you take these medications    * traMADol 50 mg tablet  Commonly known as:  ULTRAM  Take 1 tablet (50 mg total) by mouth every 6 (six) hours as needed for Pain.  What changed:  Another medication with the same name was added. Make sure you understand how and when to take each.     * traMADol 50 mg tablet  Commonly known as:  ULTRAM  Take 1 tablet (50 mg total) by mouth every 6 (six) hours as needed.  What changed:  You were already taking a medication with the same name, and this prescription was added. Make sure you understand how and when to take each.         * This list has 2 medication(s) that are the same as other medications prescribed for you. Read the directions carefully, and ask your doctor or other care provider to review them with you.            CONTINUE taking these medications    ALEVE ORAL  Take by mouth as needed (Abdominal pain).     dicyclomine 10 MG capsule  Commonly known as:  BENTYL  TAKE 1 CAPSULE BY MOUTH 3 TIMES A DAY AS NEEDED FOR PAIN     erythromycin with ethanol 2 % external  solution  Commonly known as:  THERAMYCIN  1 application every evening. Apply to affected area     losartan 100 MG tablet  Commonly known as:  COZAAR  once daily.     ondansetron 8 MG tablet  Commonly known as:  ZOFRAN  Take 1 tablet (8 mg total) by mouth every 8 (eight) hours as needed for Nausea.     oxyCODONE 15 MG Tab  Commonly known as:  ROXICODONE  Take 1 tablet (15 mg total) by mouth every 4 (four) hours as needed for Pain.     STOOL SOFTENER ORAL  Take by mouth as needed (Constipation).     sulfamethoxazole-trimethoprim 400-80mg 400-80 mg per tablet  Commonly known as:  BACTRIM,SEPTRA  Take 1 tablet by mouth once daily.     zolpidem 10 mg Tab  Commonly known as:  AMBIEN  every evening.        STOP taking these medications    cephALEXin 500 MG capsule  Commonly known as:  KEFLEX            Veena Jean MD  Gynecologic Oncology  [facility]

## 2020-02-19 ENCOUNTER — HOSPITAL ENCOUNTER (INPATIENT)
Facility: HOSPITAL | Age: 85
LOS: 2 days | Discharge: HOSPICE | DRG: 189 | End: 2020-02-21
Attending: EMERGENCY MEDICINE | Admitting: INTERNAL MEDICINE
Payer: MEDICARE

## 2020-02-19 ENCOUNTER — NURSE TRIAGE (OUTPATIENT)
Dept: ADMINISTRATIVE | Facility: CLINIC | Age: 85
End: 2020-02-19

## 2020-02-19 ENCOUNTER — TELEPHONE (OUTPATIENT)
Dept: GYNECOLOGIC ONCOLOGY | Facility: CLINIC | Age: 85
End: 2020-02-19

## 2020-02-19 DIAGNOSIS — I50.9 CONGESTIVE HEART FAILURE, UNSPECIFIED HF CHRONICITY, UNSPECIFIED HEART FAILURE TYPE: ICD-10-CM

## 2020-02-19 DIAGNOSIS — R06.02 SOB (SHORTNESS OF BREATH): ICD-10-CM

## 2020-02-19 DIAGNOSIS — R09.02 HYPOXIA: Primary | ICD-10-CM

## 2020-02-19 DIAGNOSIS — J96.90 RESPIRATORY FAILURE, UNSPECIFIED CHRONICITY, UNSPECIFIED WHETHER WITH HYPOXIA OR HYPERCAPNIA: ICD-10-CM

## 2020-02-19 DIAGNOSIS — J96.01 ACUTE RESPIRATORY FAILURE WITH HYPOXIA: ICD-10-CM

## 2020-02-19 DIAGNOSIS — I26.99 PULMONARY EMBOLISM, UNSPECIFIED CHRONICITY, UNSPECIFIED PULMONARY EMBOLISM TYPE, UNSPECIFIED WHETHER ACUTE COR PULMONALE PRESENT: ICD-10-CM

## 2020-02-19 DIAGNOSIS — I50.9 CHF (CONGESTIVE HEART FAILURE): ICD-10-CM

## 2020-02-19 DIAGNOSIS — Z51.5 END OF LIFE CARE: ICD-10-CM

## 2020-02-19 DIAGNOSIS — Z51.5 PALLIATIVE CARE ENCOUNTER: ICD-10-CM

## 2020-02-19 LAB
ALBUMIN SERPL BCP-MCNC: 2.8 G/DL (ref 3.5–5.2)
ALP SERPL-CCNC: 84 U/L (ref 55–135)
ALT SERPL W/O P-5'-P-CCNC: 19 U/L (ref 10–44)
ANION GAP SERPL CALC-SCNC: 9 MMOL/L (ref 8–16)
APTT BLDCRRT: 30.3 SEC (ref 21–32)
AST SERPL-CCNC: 25 U/L (ref 10–40)
BASOPHILS # BLD AUTO: 0.05 K/UL (ref 0–0.2)
BASOPHILS NFR BLD: 0.6 % (ref 0–1.9)
BILIRUB SERPL-MCNC: 0.3 MG/DL (ref 0.1–1)
BNP SERPL-MCNC: 282 PG/ML (ref 0–99)
BUN SERPL-MCNC: 8 MG/DL (ref 8–23)
CALCIUM SERPL-MCNC: 8.2 MG/DL (ref 8.7–10.5)
CHLORIDE SERPL-SCNC: 102 MMOL/L (ref 95–110)
CO2 SERPL-SCNC: 27 MMOL/L (ref 23–29)
CREAT SERPL-MCNC: 0.7 MG/DL (ref 0.5–1.4)
CTP QC/QA: YES
D DIMER PPP IA.FEU-MCNC: 4 MG/L FEU
DIFFERENTIAL METHOD: ABNORMAL
EOSINOPHIL # BLD AUTO: 0.3 K/UL (ref 0–0.5)
EOSINOPHIL NFR BLD: 3.4 % (ref 0–8)
ERYTHROCYTE [DISTWIDTH] IN BLOOD BY AUTOMATED COUNT: 14.2 % (ref 11.5–14.5)
EST. GFR  (AFRICAN AMERICAN): >60 ML/MIN/1.73 M^2
EST. GFR  (NON AFRICAN AMERICAN): >60 ML/MIN/1.73 M^2
GLUCOSE SERPL-MCNC: 104 MG/DL (ref 70–110)
HCT VFR BLD AUTO: 25.4 % (ref 37–48.5)
HGB BLD-MCNC: 8.1 G/DL (ref 12–16)
IMM GRANULOCYTES # BLD AUTO: 0.03 K/UL (ref 0–0.04)
IMM GRANULOCYTES NFR BLD AUTO: 0.4 % (ref 0–0.5)
INR PPP: 1 (ref 0.8–1.2)
LYMPHOCYTES # BLD AUTO: 1.9 K/UL (ref 1–4.8)
LYMPHOCYTES NFR BLD: 24.4 % (ref 18–48)
MCH RBC QN AUTO: 28.2 PG (ref 27–31)
MCHC RBC AUTO-ENTMCNC: 31.9 G/DL (ref 32–36)
MCV RBC AUTO: 89 FL (ref 82–98)
MONOCYTES # BLD AUTO: 0.9 K/UL (ref 0.3–1)
MONOCYTES NFR BLD: 11 % (ref 4–15)
NEUTROPHILS # BLD AUTO: 4.7 K/UL (ref 1.8–7.7)
NEUTROPHILS NFR BLD: 60.2 % (ref 38–73)
NRBC BLD-RTO: 0 /100 WBC
PLATELET # BLD AUTO: 506 K/UL (ref 150–350)
PMV BLD AUTO: 8.9 FL (ref 9.2–12.9)
POC MOLECULAR INFLUENZA A AGN: NEGATIVE
POC MOLECULAR INFLUENZA B AGN: NEGATIVE
POTASSIUM SERPL-SCNC: 3 MMOL/L (ref 3.5–5.1)
PROT SERPL-MCNC: 5.3 G/DL (ref 6–8.4)
PROTHROMBIN TIME: 10.7 SEC (ref 9–12.5)
RBC # BLD AUTO: 2.87 M/UL (ref 4–5.4)
SODIUM SERPL-SCNC: 138 MMOL/L (ref 136–145)
TROPONIN I SERPL DL<=0.01 NG/ML-MCNC: 0.03 NG/ML (ref 0–0.03)
WBC # BLD AUTO: 7.76 K/UL (ref 3.9–12.7)

## 2020-02-19 PROCEDURE — 99232 SBSQ HOSP IP/OBS MODERATE 35: CPT | Mod: ,,, | Performed by: NURSE PRACTITIONER

## 2020-02-19 PROCEDURE — 94640 AIRWAY INHALATION TREATMENT: CPT

## 2020-02-19 PROCEDURE — 81000 URINALYSIS NONAUTO W/SCOPE: CPT

## 2020-02-19 PROCEDURE — 99292 CRITICAL CARE ADDL 30 MIN: CPT

## 2020-02-19 PROCEDURE — 27000190 HC CPAP FULL FACE MASK W/VALVE

## 2020-02-19 PROCEDURE — 96365 THER/PROPH/DIAG IV INF INIT: CPT

## 2020-02-19 PROCEDURE — 87502 INFLUENZA DNA AMP PROBE: CPT

## 2020-02-19 PROCEDURE — 99900035 HC TECH TIME PER 15 MIN (STAT)

## 2020-02-19 PROCEDURE — 94761 N-INVAS EAR/PLS OXIMETRY MLT: CPT

## 2020-02-19 PROCEDURE — 99291 CRITICAL CARE FIRST HOUR: CPT | Mod: 25

## 2020-02-19 PROCEDURE — 25000242 PHARM REV CODE 250 ALT 637 W/ HCPCS: Performed by: EMERGENCY MEDICINE

## 2020-02-19 PROCEDURE — 85379 FIBRIN DEGRADATION QUANT: CPT

## 2020-02-19 PROCEDURE — 80053 COMPREHEN METABOLIC PANEL: CPT

## 2020-02-19 PROCEDURE — 99232 PR SUBSEQUENT HOSPITAL CARE,LEVL II: ICD-10-PCS | Mod: ,,, | Performed by: NURSE PRACTITIONER

## 2020-02-19 PROCEDURE — 83880 ASSAY OF NATRIURETIC PEPTIDE: CPT

## 2020-02-19 PROCEDURE — 25500020 PHARM REV CODE 255: Performed by: EMERGENCY MEDICINE

## 2020-02-19 PROCEDURE — 63600175 PHARM REV CODE 636 W HCPCS: Performed by: EMERGENCY MEDICINE

## 2020-02-19 PROCEDURE — 63600175 PHARM REV CODE 636 W HCPCS

## 2020-02-19 PROCEDURE — 96361 HYDRATE IV INFUSION ADD-ON: CPT

## 2020-02-19 PROCEDURE — 96376 TX/PRO/DX INJ SAME DRUG ADON: CPT

## 2020-02-19 PROCEDURE — 63600175 PHARM REV CODE 636 W HCPCS: Performed by: INTERNAL MEDICINE

## 2020-02-19 PROCEDURE — 93010 ELECTROCARDIOGRAM REPORT: CPT | Mod: ,,, | Performed by: INTERNAL MEDICINE

## 2020-02-19 PROCEDURE — 84484 ASSAY OF TROPONIN QUANT: CPT

## 2020-02-19 PROCEDURE — 25000003 PHARM REV CODE 250: Performed by: EMERGENCY MEDICINE

## 2020-02-19 PROCEDURE — 85610 PROTHROMBIN TIME: CPT

## 2020-02-19 PROCEDURE — 93010 EKG 12-LEAD: ICD-10-PCS | Mod: ,,, | Performed by: INTERNAL MEDICINE

## 2020-02-19 PROCEDURE — 96375 TX/PRO/DX INJ NEW DRUG ADDON: CPT

## 2020-02-19 PROCEDURE — 85025 COMPLETE CBC W/AUTO DIFF WBC: CPT

## 2020-02-19 PROCEDURE — 93005 ELECTROCARDIOGRAM TRACING: CPT

## 2020-02-19 PROCEDURE — 85730 THROMBOPLASTIN TIME PARTIAL: CPT

## 2020-02-19 PROCEDURE — 11000001 HC ACUTE MED/SURG PRIVATE ROOM

## 2020-02-19 RX ORDER — MORPHINE SULFATE 10 MG/ML
2 INJECTION INTRAMUSCULAR; INTRAVENOUS; SUBCUTANEOUS EVERY 4 HOURS PRN
Status: DISCONTINUED | OUTPATIENT
Start: 2020-02-19 | End: 2020-02-19

## 2020-02-19 RX ORDER — MORPHINE SULFATE 10 MG/ML
3 INJECTION INTRAMUSCULAR; INTRAVENOUS; SUBCUTANEOUS EVERY 4 HOURS PRN
Status: DISCONTINUED | OUTPATIENT
Start: 2020-02-19 | End: 2020-02-20

## 2020-02-19 RX ORDER — IPRATROPIUM BROMIDE AND ALBUTEROL SULFATE 2.5; .5 MG/3ML; MG/3ML
3 SOLUTION RESPIRATORY (INHALATION)
Status: COMPLETED | OUTPATIENT
Start: 2020-02-19 | End: 2020-02-19

## 2020-02-19 RX ORDER — NICARDIPINE HYDROCHLORIDE 0.2 MG/ML
2.5 INJECTION INTRAVENOUS CONTINUOUS
Status: DISCONTINUED | OUTPATIENT
Start: 2020-02-19 | End: 2020-02-19

## 2020-02-19 RX ORDER — FUROSEMIDE 10 MG/ML
20 INJECTION INTRAMUSCULAR; INTRAVENOUS
Status: COMPLETED | OUTPATIENT
Start: 2020-02-19 | End: 2020-02-19

## 2020-02-19 RX ORDER — MORPHINE SULFATE 10 MG/ML
10 INJECTION INTRAMUSCULAR; INTRAVENOUS; SUBCUTANEOUS
Status: COMPLETED | OUTPATIENT
Start: 2020-02-19 | End: 2020-02-19

## 2020-02-19 RX ORDER — LORAZEPAM 0.5 MG/1
1 TABLET ORAL EVERY 30 MIN PRN
Status: COMPLETED | OUTPATIENT
Start: 2020-02-19 | End: 2020-02-19

## 2020-02-19 RX ORDER — POTASSIUM CHLORIDE 20 MEQ/15ML
40 SOLUTION ORAL
Status: COMPLETED | OUTPATIENT
Start: 2020-02-19 | End: 2020-02-19

## 2020-02-19 RX ORDER — LORAZEPAM 2 MG/ML
INJECTION INTRAMUSCULAR
Status: COMPLETED
Start: 2020-02-19 | End: 2020-02-19

## 2020-02-19 RX ORDER — ONDANSETRON 2 MG/ML
4 INJECTION INTRAMUSCULAR; INTRAVENOUS EVERY 6 HOURS PRN
Status: DISCONTINUED | OUTPATIENT
Start: 2020-02-19 | End: 2020-02-20

## 2020-02-19 RX ORDER — LORAZEPAM 2 MG/ML
1 INJECTION INTRAMUSCULAR
Status: DISCONTINUED | OUTPATIENT
Start: 2020-02-19 | End: 2020-02-19

## 2020-02-19 RX ORDER — MORPHINE SULFATE 10 MG/ML
5 INJECTION INTRAMUSCULAR; INTRAVENOUS; SUBCUTANEOUS
Status: COMPLETED | OUTPATIENT
Start: 2020-02-19 | End: 2020-02-19

## 2020-02-19 RX ORDER — HEPARIN SODIUM,PORCINE/D5W 25000/250
18 INTRAVENOUS SOLUTION INTRAVENOUS CONTINUOUS
Status: DISCONTINUED | OUTPATIENT
Start: 2020-02-19 | End: 2020-02-19

## 2020-02-19 RX ORDER — ENOXAPARIN SODIUM 100 MG/ML
40 INJECTION SUBCUTANEOUS EVERY 24 HOURS
Status: DISCONTINUED | OUTPATIENT
Start: 2020-02-19 | End: 2020-02-19

## 2020-02-19 RX ORDER — SODIUM CHLORIDE 0.9 % (FLUSH) 0.9 %
10 SYRINGE (ML) INJECTION
Status: DISCONTINUED | OUTPATIENT
Start: 2020-02-19 | End: 2020-02-21 | Stop reason: HOSPADM

## 2020-02-19 RX ORDER — LORAZEPAM 2 MG/ML
2 INJECTION INTRAMUSCULAR
Status: COMPLETED | OUTPATIENT
Start: 2020-02-19 | End: 2020-02-19

## 2020-02-19 RX ADMIN — LORAZEPAM 1 MG: 2 INJECTION INTRAMUSCULAR; INTRAVENOUS at 01:02

## 2020-02-19 RX ADMIN — HEPARIN SODIUM 18 UNITS/KG/HR: 10000 INJECTION, SOLUTION INTRAVENOUS at 08:02

## 2020-02-19 RX ADMIN — MORPHINE SULFATE 3 MG: 10 INJECTION INTRAVENOUS at 03:02

## 2020-02-19 RX ADMIN — LORAZEPAM 2 MG: 2 INJECTION INTRAMUSCULAR at 09:02

## 2020-02-19 RX ADMIN — MORPHINE SULFATE 10 MG: 10 INJECTION INTRAVENOUS at 10:02

## 2020-02-19 RX ADMIN — IPRATROPIUM BROMIDE AND ALBUTEROL SULFATE 3 ML: .5; 3 SOLUTION RESPIRATORY (INHALATION) at 09:02

## 2020-02-19 RX ADMIN — LORAZEPAM 1 MG: 2 INJECTION INTRAMUSCULAR; INTRAVENOUS at 10:02

## 2020-02-19 RX ADMIN — FUROSEMIDE 20 MG: 10 INJECTION, SOLUTION INTRAMUSCULAR; INTRAVENOUS at 08:02

## 2020-02-19 RX ADMIN — LORAZEPAM 1 MG: 0.5 TABLET ORAL at 10:02

## 2020-02-19 RX ADMIN — IOHEXOL 75 ML: 350 INJECTION, SOLUTION INTRAVENOUS at 08:02

## 2020-02-19 RX ADMIN — POTASSIUM CHLORIDE 40 MEQ: 20 SOLUTION ORAL at 07:02

## 2020-02-19 RX ADMIN — MORPHINE SULFATE 5 MG: 10 INJECTION INTRAVENOUS at 10:02

## 2020-02-19 RX ADMIN — SODIUM CHLORIDE 1000 ML: 0.9 INJECTION, SOLUTION INTRAVENOUS at 07:02

## 2020-02-19 RX ADMIN — MORPHINE SULFATE 3 MG: 10 INJECTION INTRAVENOUS at 08:02

## 2020-02-19 RX ADMIN — LORAZEPAM 2 MG: 2 INJECTION INTRAMUSCULAR; INTRAVENOUS at 09:02

## 2020-02-19 NOTE — ASSESSMENT & PLAN NOTE
2/2 pulmonary embolism  Patient's wishes are to proceed with comfort measures and not to treat respiratory failure or embolism  Code status changed to DNR and transitioned to full comfort measures  Currently at goal with PRN morphine and ativan IV  Too unstable to transfer to inpatient hospice at this time and home hospice is inappropriate based on extensive care patient requires to achieve full comfort  Family at bedside. Emotional support provided  Bereavement tray. Palliative care on board

## 2020-02-19 NOTE — ED NOTES
Per Dr. Dias give 5mg morphine and see how pt tolerates and give 5mg if not tolerated well. 5mg given at 0959, second 5mg given at 0951 per MD.

## 2020-02-19 NOTE — ED NOTES
Per pt's request, called & spoke with someone at [facility] to inform that pt is in the emergency department and would not be home for her first home health visit that was scheduled for this morning; [facility] representative states that he will inform the home health nurse and will call if they have any further questions

## 2020-02-19 NOTE — RESPIRATORY THERAPY
0848 Bipap initiated per Dr. Dias for increased WOB and SOB. Bipap settings as documented. All alarms set and functional.     0938 Pt adamant that Bipap be removed. Pt's daughter and Dr. Dias at bedside. Bipap removed and pt placed on NRB.

## 2020-02-19 NOTE — TELEPHONE ENCOUNTER
Called to reach out to patient and family with recent presentation to ED. New PE diagnosed. CTA prior to discharge showed no PE and was on prophylactic lovenox. Requiring oxygen. Discussed with family. Patient has expressed to the family that she does not want any interventions. If able to support I would recommend support and treat reversible processes. I am understanding and supportive of her her wishes for DNR/DNI.    Offered my support and wish to be continuously updated. They said thank you.

## 2020-02-19 NOTE — CONSULTS
"[id number]    Received consult from ED and wanted me to come down to talk with family. Discussion with nurse and Dr Dias prior to seeing patient.    HPI: 84 year old female with uterine cancer s/p JENA-BSO/right colectomy/para-caval node resection earlier this month who presented with the emergency department with shortness of breath and generalized weakness. While in the ED, patient was able to provide history and was stable from respiratory standpoint. While in the ED, patient experienced acute worsening tachypnea, anxiety and hypoxia. Was placed on non rebreather and CTA of chest obtained. This was positive for pulmonary embolism. Patient then requested non rebreather to be removed. Was placed on BiPAP as oxygen saturation continued to drop to low 60s. Patient also requested this be removed. ED staff, patient and daughter discussed goals of care. Expressed that comfort is main goal and want no heroic measures be taken. Also expressed DNR as code status. Palliative care consulted     Assessment/Plan:  Patient is unresponsive primarily from medications Morphine and Ativan given earlier. Patient continues to be SON using abdominal muscles. Daughters and son   In laws at bedside.    Advance Care Planning     [facility]  I engaged the family and healthcare power of   in a conversation about advance care planning and we specifically addressed what the goals of care would be moving forward, in light of the patient's change in clinical status. We explored the patient's values and preferences for future care. Daughter reports her mother recently had surgery and was to start chemo in 4 weeks but had decided she did not want chemo and mentioned that she wished she would not have had surgery but did for more of pain control. She state " my mother feels her QOL  has diminished and anything less in unacceptable to her. She has expressed her wish to die. We did specifically address the patient's likely prognosis, which is poor " given her underlying diagnosis and current clinical condition. We discussed dying could be hours to days. She states her mother's fear is dying of suffocation and I assured family we would make sure she was comfortable. Asked bedside nurse to give another dose of Morphine.   We discussed inpatient hospice as an option if patient is able to stabilize and explained the role for hospice care at this stage of the patient's illness, including its ability to help the patient live with the best quality of life possible but in the meantime we will need to keep her here and manage her as she is too unstable to transfer at this time. Family agree. Addressed all questions and concerns. The son in law having difficulty with decision and allowed him to talk. His wife reminded him of what her wishes were and we talked about her life and how independent she had been and he was supportive after he realized she would not be ok with anything less than what she was prior to this event.  Emotional support provided  The family and healthcare power of   endorses that what is most important right now is to focus on symptom/pain control and quality of life, even if it means sacrificing a little time.   I confirmed the DNR status.  Updated Dr Dias. Dr Nath notified for admission approval and came down to ED to assess and agrees with plan     I will continue to follow      >50% of 70 mins spent in face to face encounter, review of chart and documentation, symptom management and coordination of care

## 2020-02-19 NOTE — ED PROVIDER NOTES
Encounter Date: 2/19/2020    SCRIBE #1 NOTE: I, Nancy Oliveira, am scribing for, and in the presence of,  Brina Dias MD. I have scribed the following portions of the note - Other sections scribed: HPI, ROS, PE, MDM, Critical Care.       History     Chief Complaint   Patient presents with    Shortness of Breath     since being discharged post hysterectomy yesterday, RA sats of 92% upon EMS arrival, placed on 2Lnc while in route to ED,  also c/o R leg pain      84 year old female patient presents to the ED complaining of shortness of breath onset yesterday. She denies any prior history of shortness of breath. She also complains of nausea. She denies any fever or chest pain. She also complains of right leg pain, but states that this was present before her recent surgery. She reports that she had debulking surgery for advanced endometrial cancer performed by Dr. Stephany Arredondo at [facility] on 2/10/2020. She reports that she was discharged from the hospital yesterday. She states that she has been taking Tramadol and stool softeners since the surgery. She reports that her endometrial cancer was discovered when she began experiencing abdominal pain. She reports a past medical history of Hypertension. She denies any blood thinner use.    The history is provided by the patient.     Review of patient's allergies indicates:   Allergen Reactions    Pcn [penicillins] Other (See Comments)     Patient can't remember the reaction. Reaction occurred over 60 years     Past Medical History:   Diagnosis Date    Basal cell carcinoma     Cataract     Cystitis     DNR (do not resuscitate) 02/19/2020    order written by ED provider, hospice consulted    Endometrial cancer 11/2019    Glaucoma     Hypertension     Pulmonary embolism 02/19/2020     Past Surgical History:   Procedure Laterality Date    BILATERAL SALPINGO-OOPHORECTOMY (BSO) N/A 2/10/2020    Procedure: SALPINGO-OOPHORECTOMY, BILATERAL;  Surgeon: Stephany SOLIZ  MD Arnulfo;  Location: [address];  Service: OB/GYN;  Laterality: N/A;    BREAST SURGERY Bilateral     augmentation    CATARACT EXTRACTION W/  INTRAOCULAR LENS IMPLANT Bilateral     CATHETERIZATION OF URETER Bilateral 2/10/2020    Procedure: CATHETERIZATION, URETER;  Surgeon: Taj Taylor MD;  Location: [address];  Service: Urology;  Laterality: Bilateral;    COLECTOMY Right 2/10/2020    Procedure: COLECTOMY;  Surgeon: Hiren Mckeon MD;  Location: [address];  Service: General;  Laterality: Right;    CYSTOSCOPY N/A 2/10/2020    Procedure: CYSTOSCOPY;  Surgeon: Taj Taylor MD;  Location: [address];  Service: Urology;  Laterality: N/A;    DEBULKING OF TUMOR N/A 2/10/2020    Procedure: DEBULKING, NEOPLASM;  Surgeon: Stephany Arredondo MD;  Location: [address];  Service: OB/GYN;  Laterality: N/A;    EYE SURGERY      RETROPERITONEAL LYMPHADENECTOMY Right 2/10/2020    Procedure: LYMPHADENECTOMY, RETROPERITONEUM;  Surgeon: Hiren Mckeon MD;  Location: [address];  Service: General;  Laterality: Right;    TOTAL ABDOMINAL HYSTERECTOMY N/A 2/10/2020    Procedure: HYSTERECTOMY, TOTAL, ABDOMINAL;  Surgeon: Stephany Arredondo MD;  Location: [address];  Service: OB/GYN;  Laterality: N/A;    VAGINAL DELIVERY       Family History   Problem Relation Age of Onset    Hypertension Father     Cataracts Father     Heart attack Father 64    Cataracts Mother     Amblyopia Neg Hx     Blindness Neg Hx     Glaucoma Neg Hx     Macular degeneration Neg Hx     Retinal detachment Neg Hx     Strabismus Neg Hx      Social History     Tobacco Use    Smoking status: Never Smoker    Smokeless tobacco: Never Used   Substance Use Topics    Alcohol use: No     Frequency: Never    Drug use: No     Review of Systems   Constitutional: Negative for fever.   HENT: Negative for sore throat.    Eyes: Negative for visual disturbance.   Respiratory: Positive for shortness of breath.     Cardiovascular: Negative for chest pain.   Gastrointestinal: Positive for nausea.   Genitourinary: Negative for dysuria.   Musculoskeletal: Negative for back pain.        (+) Right leg pain (present before surgery)   Skin: Negative for rash.   Neurological: Negative for headaches.       Physical Exam     Initial Vitals [02/19/20 0545]   BP Pulse Resp Temp SpO2   (!) 150/88 93 20 97.7 °F (36.5 °C) 97 %      MAP       --         Physical Exam    Nursing note and vitals reviewed.  Constitutional: She appears well-developed and well-nourished. She is not diaphoretic. She appears distressed (Mild).   HENT:   Head: Normocephalic and atraumatic.   Mouth/Throat: Mucous membranes are dry (Mucous membranes are very dry).   Eyes: Conjunctivae and EOM are normal. No scleral icterus.   Neck: Normal range of motion. Neck supple.   Cardiovascular: Normal rate and regular rhythm.   Occasional skipped beat.   Pulmonary/Chest: Breath sounds normal. No respiratory distress. She has no wheezes. She has no rhonchi. She has no rales.   Abdominal: Soft.   Musculoskeletal: Normal range of motion. She exhibits tenderness (Mild tenderness to palpation to medial lower right leg). She exhibits no edema.   Neurological: She is alert and oriented to person, place, and time. GCS score is 15. GCS eye subscore is 4. GCS verbal subscore is 5. GCS motor subscore is 6.   Skin: Skin is warm and dry. Capillary refill takes less than 2 seconds. No rash noted.         ED Course   Procedures  Labs Reviewed   CBC W/ AUTO DIFFERENTIAL - Abnormal; Notable for the following components:       Result Value    RBC 2.87 (*)     Hemoglobin 8.1 (*)     Hematocrit 25.4 (*)     Mean Corpuscular Hemoglobin Conc 31.9 (*)     Platelets 506 (*)     MPV 8.9 (*)     All other components within normal limits   COMPREHENSIVE METABOLIC PANEL - Abnormal; Notable for the following components:    Potassium 3.0 (*)     Calcium 8.2 (*)     Total Protein 5.3 (*)     Albumin 2.8  (*)     All other components within normal limits   B-TYPE NATRIURETIC PEPTIDE - Abnormal; Notable for the following components:     (*)     All other components within normal limits   D DIMER, QUANTITATIVE - Abnormal; Notable for the following components:    D-Dimer 4.00 (*)     All other components within normal limits   TROPONIN I   PROTIME-INR   APTT   URINALYSIS   URINALYSIS MICROSCOPIC   POCT INFLUENZA A/B MOLECULAR        ECG Results          EKG 12-lead (In process)  Result time 02/19/20 15:16:29    In process by Interface, Lab In [facility] (02/19/20 15:16:29)                 Narrative:    Test Reason : R06.02,    Vent. Rate : 099 BPM     Atrial Rate : 099 BPM     P-R Int : 168 ms          QRS Dur : 082 ms      QT Int : 378 ms       P-R-T Axes : 061 073 130 degrees     QTc Int : 485 ms    Sinus rhythm with Premature atrial complexes  Septal infarct (cited on or before 19-FEB-2020)  Abnormal ECG  When compared with ECG of 19-FEB-2020 06:01,  Significant changes have occurred    Referred By: [email or web address]   SELF           Confirmed By:                   In process by Interface, Lab In [facility] (02/19/20 15:15:14)                 Narrative:    Test Reason : R06.02,    Vent. Rate : 099 BPM     Atrial Rate : 099 BPM     P-R Int : 168 ms          QRS Dur : 082 ms      QT Int : 378 ms       P-R-T Axes : 061 073 130 degrees     QTc Int : 485 ms    Sinus rhythm with Premature atrial complexes  Septal infarct (cited on or before 19-FEB-2020)  Abnormal ECG  When compared with ECG of 19-FEB-2020 06:01,  Significant changes have occurred    Referred By: [email or web address]   SELF           Confirmed By:                              EKG 12-lead (In process)  Result time 02/19/20 15:16:29    In process by Interface, Lab In [facility] (02/19/20 15:16:29)                 Narrative:    Test Reason : R06.02,    Vent. Rate : 083 BPM     Atrial Rate : 083 BPM     P-R Int : 178 ms          QRS Dur : 086 ms      QT Int : 360 ms       P-R-T  Axes : 055 070 222 degrees     QTc Int : 423 ms    Sinus rhythm with frequent Premature ventricular complexes  Septal infarct (cited on or before 19-FEB-2020)  Abnormal ECG  When compared with ECG of 13-FEB-2020 13:53,  Significant changes have occurred    Referred By: AAAREFERR   SELF           Confirmed By:                   In process by Interface, Lab In [facility] (02/19/20 15:15:32)                 Narrative:    Test Reason : R06.02,    Vent. Rate : 083 BPM     Atrial Rate : 083 BPM     P-R Int : 178 ms          QRS Dur : 086 ms      QT Int : 360 ms       P-R-T Axes : 055 070 222 degrees     QTc Int : 423 ms    Sinus rhythm with frequent Premature ventricular complexes  Septal infarct (cited on or before 19-FEB-2020)  Abnormal ECG  When compared with ECG of 13-FEB-2020 13:53,  Significant changes have occurred    Referred By: [email or web address]   SELF           Confirmed By:                             Imaging Results          CTA Chest Non-Coronary (PE Study) (Final result)  Result time 02/19/20 08:32:36    Final result by Lewis Navarrete MD (02/19/20 08:32:36)                 Impression:      1. Filling defect in the right upper lobe pulmonary artery representing acute pulmonary embolism since the previous study.  2. Bilateral pleural effusions.  3. Patchy airspace opacities scattered throughout the lungs bilaterally likely representing edema/pulmonary infiltrates.  4. Development of subpleural atelectatic changes in the right middle lobe.  5. Findings suggestive of multiple small peripheral nodules in the upper lobes bilaterally.  6. Ascites as above.      Electronically signed by: Lewis Navarrete MD  Date:    02/19/2020  Time:    08:32             Narrative:    EXAMINATION:  CTA CHEST NON CORONARY    CLINICAL HISTORY:  Chest pain, acute, PE suspected, intermed prob, positive D-dimer;    TECHNIQUE:  Low dose axial images, sagittal and coronal reformations were obtained from the thoracic inlet to the lung bases following the  IV administration of 100 mL of Omnipaque 350.  Contrast timing was optimized to evaluate the pulmonary arteries.  MIP images were performed.    COMPARISON:  CTA 02/14/2020    FINDINGS:  Since the previous examination there is now a filling defect in the proximal segment of the the right upper lobe pulmonary artery (image 170 series 2.  The filling defects extends into the apicoposterior segment of the right upper lobe representing pulmonary embolism.  In the pulmonary arteries extending into the right middle lobe and the right lower lobe no filling defects to suggest any emboli.  No filling defects in the left pulmonary artery.  Also in the main pulmonary artery there are no filling defects.  There is normal appearance of the ascending aorta and the descending thoracic aorta without aneurysmal dilatation or signs for dissections.  Atherosclerosis of the origins of the great vessels of the aortic arch.    Bilateral pleural effusions slightly larger on the left compared to the right remain without significant change.  The in addition there is also scattered patchy bilateral ground-glass opacities either representing edema or pulmonary infiltrates.  Or these have partially cleared when compared with the previous study.  Since the previous examination there is also suggestion of subpleural atelectatic changes in the right middle lobe.  There is also small pulmonary nodules (image 25 series 603 in the upper lobes bilaterally.  A questionable approximately 1.3 cm nodule also seen in the left apex.    Heart size is enlarged.  No pericardial effusion.  There is no axillary or supraclavicular adenopathy.    There is ascites overlying the liver.  Approximately 4.5 cm cyst associated with the left kidney.  A there are spondylotic changes in the dorsal spine.  No acute fractures or osteoblastic or lytic lesion.  Bilateral calcified breast implants unchanged from previous study.                               X-Ray Chest AP  Portable (Final result)  Result time 02/19/20 07:48:03    Final result by David Barrera MD (02/19/20 07:48:03)                 Impression:      Suspected mild left basilar infiltrate/atelectasis and small amount of pleural fluid, partially obscured by breast implant, appearing similar to the prior study with mild progression.      Electronically signed by: David Barrera  Date:    02/19/2020  Time:    07:48             Narrative:    EXAMINATION:  XR CHEST AP PORTABLE    CLINICAL HISTORY:  CHF;    TECHNIQUE:  Single frontal view of the chest was performed.    COMPARISON:  February 13, 2020    FINDINGS:  Single chest view is submitted.  Bilateral breast implants are noted.  The patient is rotated when accounting for difference in position the cardiomediastinal silhouette appears stable.  Bilateral implants obscure the lung bases to some degree particularly at the left lung base, however there is a suspicion of mild atelectasis or infiltrate and perhaps mild pleural fluid at the left lung base.  There is no significant pleural effusion on the right.  There is no evidence for dense consolidation and no evidence for pneumothorax.  The osseous structures appear intact with chronic change noted.                               US Lower Extremity Veins Right (Final result)  Result time 02/19/20 07:13:46   Procedure changed from US Lower Extremity Venous Insufficiency Right     Final result by David Barrera MD (02/19/20 07:13:46)                 Impression:      There is no sonographic evidence for intraluminal thrombus/DVT of the visualized venous structures of the right lower extremity.    Suspected popliteal fossa/Baker's cyst.      Electronically signed by: David Barrera  Date:    02/19/2020  Time:    07:13             Narrative:    EXAMINATION:  US LOWER EXTREMITY VEINS RIGHT    CLINICAL HISTORY:  leg pain;    TECHNIQUE:  Duplex and color flow Doppler evaluation and graded compression of the right lower  extremity veins was performed.    COMPARISON:  None    FINDINGS:  Right thigh veins: The common femoral, femoral, popliteal, upper greater saphenous, and deep femoral veins are patent and free of thrombus. The veins are normally compressible and have normal phasic flow and augmentation response.    Right calf veins: The visualized calf veins are patent.    Contralateral CFV: The contralateral (left) common femoral vein is patent and free of thrombus.    Miscellaneous: At the level of the popliteal fossa there is a hypoechoic structure that does not demonstrate internal flow on color imaging measuring approximately 2.6 x 1.1 x 2.2 cm in size, likely popliteal fossa Baker cyst.                                 Medical Decision Making:   History:   Old Medical Records: I decided to obtain old medical records.  Old Records Summarized: records from clinic visits.       <> Summary of Records: The patient had a uterine mass discovered in October 2019 and was subsequently diagnosed with endometrial cancer following biopsy. She had a debulking surgery for advanced endometrial cancer performed on 2/10/2020.  Initial Assessment:   84 year old female patient presents to the ED complaining of shortness of breath onset yesterday. I will order lab work, a chest x ray, and a US lower right extremity. I will reassess the patient once I have reviewed the results.  Clinical Tests:   Lab Tests: Ordered and Reviewed  Radiological Study: Ordered and Reviewed  Medical Tests: Ordered and Reviewed            Scribe Attestation:   Scribe #1: I performed the above scribed service and the documentation accurately describes the services I performed. I attest to the accuracy of the note.    Attending Attestation:         Attending Critical Care:   Critical Care Times:   ==============================================================  · Total Critical Care Time - exclusive of procedural time: 120  minutes.  ==============================================================  Critical care was necessary to treat or prevent imminent or life-threatening deterioration of the following conditions: respiratory failure.   The following critical care procedures were done by me (see procedure notes): pulse oximetry.   Critical care was time spent personally by me on the following activities: examination of patient, obtaining history from patient or relative, review of old charts, ordering lab, x-rays, and/or EKG, development of treatment plan with patient or relative, ordering and performing treatments and interventions, evaluation of patient's response to treatment, re-evaluation of patient's conition and interpretation of cardiac measurements.               ED Course as of Feb 19 1546   Wed Feb 19, 2020   0625 My independent interpretation of the EKG is sinus rhythm at 83 beats per minute occasional ectopic beat.  There is no evidence of ST segment elevation infarct.  There is poor R-wave progression.  Compared to old EKGs the ectopy is more frequent   EKG 12-lead [MH]   0702 CBC reveals anemia at 8 and 25.  Patient has been anemic in the past due to her postop surgical status.    [MH]   0703 Influenza negative    [MH]   0709 Trop wnl    [MH]   0716  which is intermediate.    [MH]   0717 Ultrasound does not reveal thrombus: there is a suspicion of a Baker cyst    [MH]   0722 CMPReviewed.  The potassium is low at 3.0.  BUN and creatinine are normal. Protein and albumin are low.    [MH]   0742 Dimer elevated   D-Dimer(!): 4.00 [MH]   0744 CXR: ?increased vascular markings in the upper fields on my evaluation.  Awaiting final radiology read    [MH]   0751 CXR: Suspected mild left basilar infiltrate/atelectasis and small amount of pleural fluid, partially obscured by breast implant, appearing similar to the prior study with mild progression.    [MH]   0829 Pox drop to low 80s on room air with only min improvement to  88 on 4 l nc.  No chest pain:+ SOB.    [MH]   0837 Repeat EKG reveals sinus rhythm with poor R-wave progression.  No ST segment elevation.  No ectopy   EKG 12-lead [MH]   0838 CTA c/w PE  Filling defect in the right upper lobe pulmonary artery representing acute pulmonary embolism since the previous study.  2. Bilateral pleural effusions.  3. Patchy airspace opacities scattered throughout the lungs bilaterally likely representing edema/pulmonary infiltrates.  4. Development of subpleural atelectatic changes in the right middle lobe.  5. Findings suggestive of multiple small peripheral nodules in the upper lobes bilaterally.  6. Ascites as above.    [MH]   0859 Pt acutely hypoxic with rales in all fields, coughing pink frothy sputum.  BP now to 213/112    [MH]   0905 Daughter at bedside, reporting that the patient is DNR and does not want to be on a ventilator.  The daughter reports she has the medical power of  and that both of them have signed this in the past.    [MH]   0938 Pt HR and BP improved after bipap, but pt now refusing Bipap. Pt and family requesting comfort measures only.    [MH]   0945 Pt very anxious despite ativan.  Daughter and patient are asking for more medications.  I have discussed the addition of morphine for comfort and the likelihood that this may reduce her respiratory drive and they understand this.    [MH]   0952 Family requesting .    [MH]   1013 Daughter reports that pt has donated her body to [facility] after her death    [MH]   1039 Palliative Care team at bedside. Requested more morphine for labored breathing.  Will admit for comfort measures and palliative care consult    [MH]      ED Course User Index  [MH] Brina Dias MD     10:55 AM  The medical management of Salome Moore has been transferred to the admitting team. Total Critical Care Time provided by me was: 120 minutes and included Continuous in-person monitoring of patient including vital signs,  neurological status, NIHSS or ICP monitoring with ongoing treatment changes based on patient need  Coordination of care with other physicians  Review and interpretation of radiologic studies with re-assessment of plan of care  Review and interpretation of laboratory studies with re-assessment of plan of care  Review of diagnosis, treatment options and prognosis with patient  Review of diagnosis, treatment options and prognosis with family/caregiver.  At this time, the patient's condition is unchanged, and this was relayed to the accepting team.  Please see notes from the admitting team for continued care.  Brina Dias 10:55 AM               Clinical Impression:       ICD-10-CM ICD-9-CM   1. Hypoxia R09.02 799.02   2. SOB (shortness of breath) R06.02 786.05   3. Congestive heart failure, unspecified HF chronicity, unspecified heart failure type I50.9 428.0   4. Pulmonary embolism, unspecified chronicity, unspecified pulmonary embolism type, unspecified whether acute cor pulmonale present I26.99 415.19   5. CHF (congestive heart failure) I50.9 428.0   6. Respiratory failure, unspecified chronicity, unspecified whether with hypoxia or hypercapnia J96.90 518.81   7. End of life care Z51.5 V66.7            I personally performed the services described in this documentation. All medical record entries made by the scribe were at my direction and in my presence. I have reviewed the chart and agree that the record reflects my personal performance and is accurate and complete.     Brina Dias MD  02/19/20 1044       Brina iDas MD  02/19/20 1055       Brina Dias MD  02/19/20 1549

## 2020-02-19 NOTE — CONSULTS
I visited with the Patient and family for at least 30 minutes.  Patient was asleep through the entire visit. Family was very happy to have spiritual care.  According to the nurse, my visit was urgent because the patient is actively dying.

## 2020-02-19 NOTE — ED TRIAGE NOTES
"Pt reports to ED via EMS from home with c/o SOB ongoing since yesterday (Tuesday); pt spoke with "nurse on call" yesterday about her SOB and feeling panicked, requested something for anxiety, and was prescribed Lorazepam 1mg; pt took 1 dose of medication and states she "didn't see any results/change"; pt had full abdominal hysterectomy on 2/10/20 because of uterine cancer and was discharged this past Monday 2/17/20; pt also reports anterior right leg pain from thigh down to shin but states that this was ongoing even before the surgery; pt's reports pain from abdominal incision is getting better but she feels like her lower abdomen is a little bloated/distended; pt AAOx4 and currently 93-96% room air; will continue to monitor  "

## 2020-02-19 NOTE — TELEPHONE ENCOUNTER
Post op hyst. C/O left leg pain and SOB. Advised as per protocol    Reason for Disposition   Sounds like a life-threatening emergency to the triager    Additional Information   Negative: CARDIAC ARREST suspected   Negative: Anaphylactic reaction (life-threatening allergic reaction)   Negative: Apnea (breathing stopped)   Negative: Asthma attack, severe (e.g., struggling for each breath, unable to speak)   Negative: Bleeding (severe) from skin AND can't be stopped   Negative: Bleeding (severe) from nose, mouth, vomiting, anus, vagina AND can't be stopped   Negative: Weakness, severe (i.e., unable to walk or barely able to walk, requires support) AND new onset or worsening   Negative: Trauma, severe   Negative: Suicide attempt   Negative: Stroke suspected (e.g., sudden onset of weakness of the face, arm or leg on one side of the body)   Negative: Shock suspected (e.g., cold/pale/clammy skin, too weak to stand, low BP, rapid pulse)   Negative: Seizure now   Negative: Seizure lasts > 5 minutes or first seizure ever   Negative: Respiratory distress, severe (e.g., struggling for each breath, unable to speak)   Negative: Purpura/petecchiae with fever (purple spots/dots with fever)    Protocols used: 911 [id number]-AH

## 2020-02-19 NOTE — SUBJECTIVE & OBJECTIVE
Past Medical History:   Diagnosis Date    Basal cell carcinoma     Cataract     Cystitis     DNR (do not resuscitate) 02/19/2020    order written by ED provider, hospice consulted    Endometrial cancer 11/2019    Glaucoma     Hypertension     Pulmonary embolism 02/19/2020       Past Surgical History:   Procedure Laterality Date    BILATERAL SALPINGO-OOPHORECTOMY (BSO) N/A 2/10/2020    Procedure: SALPINGO-OOPHORECTOMY, BILATERAL;  Surgeon: Stephany Arredondo MD;  Location: [facility] OR [address] [place] [address];  Service: OB/GYN;  Laterality: N/A;    BREAST SURGERY Bilateral     augmentation    CATARACT EXTRACTION W/  INTRAOCULAR LENS IMPLANT Bilateral     CATHETERIZATION OF URETER Bilateral 2/10/2020    Procedure: CATHETERIZATION, URETER;  Surgeon: Taj Taylor MD;  Location: [facility] OR [address] [place] [address];  Service: Urology;  Laterality: Bilateral;    COLECTOMY Right 2/10/2020    Procedure: COLECTOMY;  Surgeon: Hiren Mckeon MD;  Location: [address];  Service: General;  Laterality: Right;    CYSTOSCOPY N/A 2/10/2020    Procedure: CYSTOSCOPY;  Surgeon: Taj Taylor MD;  Location: [address];  Service: Urology;  Laterality: N/A;    DEBULKING OF TUMOR N/A 2/10/2020    Procedure: DEBULKING, NEOPLASM;  Surgeon: Stephany Arredondo MD;  Location: [address];  Service: OB/GYN;  Laterality: N/A;    EYE SURGERY      RETROPERITONEAL LYMPHADENECTOMY Right 2/10/2020    Procedure: LYMPHADENECTOMY, RETROPERITONEUM;  Surgeon: Hiren Mckeon MD;  Location: [address];  Service: General;  Laterality: Right;    TOTAL ABDOMINAL HYSTERECTOMY N/A 2/10/2020    Procedure: HYSTERECTOMY, TOTAL, ABDOMINAL;  Surgeon: Stephany Arredondo MD;  Location: [address];  Service: OB/GYN;  Laterality: N/A;    VAGINAL DELIVERY         Review of patient's allergies indicates:   Allergen Reactions    Pcn [penicillins] Other (See Comments)     Patient can't remember the reaction. Reaction occurred over 60 years       No current  facility-administered medications on file prior to encounter.      Current Outpatient Medications on File Prior to Encounter   Medication Sig    LORazepam (ATIVAN) 1 MG tablet Take 1 tablet (1 mg total) by mouth every 6 (six) hours as needed for Anxiety.    dicyclomine (BENTYL) 10 MG capsule TAKE 1 CAPSULE BY MOUTH 3 TIMES A DAY AS NEEDED FOR PAIN    docusate sodium (STOOL SOFTENER ORAL) Take by mouth as needed (Constipation).    enoxaparin (LOVENOX) 40 mg/0.4 mL Syrg Inject 0.4 mLs (40 mg total) into the skin once daily. for 21 days    erythromycin with ethanol (THERAMYCIN) 2 % external solution 1 application every evening. Apply to affected area    losartan (COZAAR) 100 MG tablet once daily.     naproxen sodium (ALEVE ORAL) Take by mouth as needed (Abdominal pain).    ondansetron (ZOFRAN) 8 MG tablet Take 1 tablet (8 mg total) by mouth every 8 (eight) hours as needed for Nausea.    oxyCODONE (ROXICODONE) 15 MG Tab Take 1 tablet (15 mg total) by mouth every 4 (four) hours as needed for Pain.    senna (SENOKOT) 8.6 mg tablet Take 1 tablet by mouth once daily.    sulfamethoxazole-trimethoprim 400-80mg (BACTRIM,SEPTRA) 400-80 mg per tablet Take 1 tablet by mouth once daily.    traMADol (ULTRAM) 50 mg tablet Take 1 tablet (50 mg total) by mouth every 6 (six) hours as needed for Pain.    traMADol (ULTRAM) 50 mg tablet Take 1 tablet (50 mg total) by mouth every 6 (six) hours as needed.    zolpidem (AMBIEN) 10 mg Tab every evening.      Family History     Problem Relation (Age of Onset)    Cataracts Father, Mother    Heart attack Father (64)    Hypertension Father        Tobacco Use    Smoking status: Never Smoker    Smokeless tobacco: Never Used   Substance and Sexual Activity    Alcohol use: No     Frequency: Never    Drug use: No    Sexual activity: Not Currently     Partners: Male     Review of Systems   Unable to perform ROS: Mental status change     Objective:     Vital Signs (Most Recent):  Temp:  97.8 °F (36.6 °C) (02/19/20 1300)  Pulse: (!) 167 (02/19/20 1300)  Resp: (!) 24 (02/19/20 1300)  BP: 131/67 (02/19/20 1300)  SpO2: (!) 64 % (02/19/20 1300) Vital Signs (24h Range):  Temp:  [97.7 °F (36.5 °C)-98 °F (36.7 °C)] 97.8 °F (36.6 °C)  Pulse:  [] 167  Resp:  [14-42] 24  SpO2:  [58 %-100 %] 64 %  BP: ()/() 131/67     Weight: 54.4 kg (120 lb)  Body mass index is 20.6 kg/m².    Physical Exam   Constitutional: She appears well-developed. No distress.   HENT:   Head: Normocephalic and atraumatic.   Cardiovascular:   Sinus tachycardia   Pulmonary/Chest: Effort normal. She has rales.   Abdominal: Soft.   Diminished bowel sounds   Musculoskeletal: She exhibits no edema.   Neurological:   Lethargic    Skin: Skin is warm. Capillary refill takes less than 2 seconds. She is not diaphoretic.   Psychiatric:   unable to assess   Nursing note and vitals reviewed.          Significant Labs: All pertinent labs within the past 24 hours have been reviewed.    Significant Imaging: I have reviewed all pertinent imaging results/findings within the past 24 hours.  I have reviewed and interpreted all pertinent imaging results/findings within the past 24 hours.

## 2020-02-19 NOTE — H&P
[facility] Hospital Medicine  History & Physical    Patient Name: Salome Moore  MRN: [id number]  Admission Date: 2/19/2020  Attending Physician: Marley Linder MD   Primary Care Provider: Ike Almeida MD         Patient information was obtained from relative(s), past medical records and ER records.     Subjective:     Principal Problem:Acute respiratory failure with hypoxia    Chief Complaint:   Chief Complaint   Patient presents with    Shortness of Breath     since being discharged post hysterectomy yesterday, RA sats of 92% upon EMS arrival, placed on 2Lnc while in route to ED,  also c/o R leg pain         HPI: 84 year old female with uterine cancer s/p JENA-BSO/right colectomy/para-caval node resection earlier this month who presented with the emergency department with shortness of breath and generalized weakness. While in the ED, patient was able to provide history and was stable from respiratory standpoint. While in the ED, patient experienced acute worsening tachypnea, anxiety and hypoxia. Was placed on non rebreather and CTA of chest obtained. This was positive for pulmonary embolism. Patient then requested non rebreather to be removed. Was placed on BiPAP as oxygen saturation continued to drop to low 60s. Patient also requested this be removed. ED staff, patient and daughter discussed goals of care. Expressed that comfort is main goal and want no heroic measures be taken. Also expressed DNR as code status. Palliative care consulted and transitioned to full comfort measures. Patient too unstable to transfer to inpatient hospice from ED or to proceed with home hospice. Admitted for comfort measures.     Past Medical History:   Diagnosis Date    Basal cell carcinoma     Cataract     Cystitis     DNR (do not resuscitate) 02/19/2020    order written by ED provider, hospice consulted    Endometrial cancer 11/2019    Glaucoma     Hypertension     Pulmonary embolism  02/19/2020       Past Surgical History:   Procedure Laterality Date    BILATERAL SALPINGO-OOPHORECTOMY (BSO) N/A 2/10/2020    Procedure: SALPINGO-OOPHORECTOMY, BILATERAL;  Surgeon: Stephany Arredondo MD;  Location: [address];  Service: OB/GYN;  Laterality: N/A;    BREAST SURGERY Bilateral     augmentation    CATARACT EXTRACTION W/  INTRAOCULAR LENS IMPLANT Bilateral     CATHETERIZATION OF URETER Bilateral 2/10/2020    Procedure: CATHETERIZATION, URETER;  Surgeon: Taj Taylor MD;  Location: [address];  Service: Urology;  Laterality: Bilateral;    COLECTOMY Right 2/10/2020    Procedure: COLECTOMY;  Surgeon: Hiren Mckeon MD;  Location: [address];  Service: General;  Laterality: Right;    CYSTOSCOPY N/A 2/10/2020    Procedure: CYSTOSCOPY;  Surgeon: Taj Taylor MD;  Location: [address];  Service: Urology;  Laterality: N/A;    DEBULKING OF TUMOR N/A 2/10/2020    Procedure: DEBULKING, NEOPLASM;  Surgeon: Stephany Arredondo MD;  Location: [address];  Service: OB/GYN;  Laterality: N/A;    EYE SURGERY      RETROPERITONEAL LYMPHADENECTOMY Right 2/10/2020    Procedure: LYMPHADENECTOMY, RETROPERITONEUM;  Surgeon: Hiren Mckeon MD;  Location: [address];  Service: General;  Laterality: Right;    TOTAL ABDOMINAL HYSTERECTOMY N/A 2/10/2020    Procedure: HYSTERECTOMY, TOTAL, ABDOMINAL;  Surgeon: Stephany Arredondo MD;  Location: [address];  Service: OB/GYN;  Laterality: N/A;    VAGINAL DELIVERY         Review of patient's allergies indicates:   Allergen Reactions    Pcn [penicillins] Other (See Comments)     Patient can't remember the reaction. Reaction occurred over 60 years       No current facility-administered medications on file prior to encounter.      Current Outpatient Medications on File Prior to Encounter   Medication Sig    LORazepam (ATIVAN) 1 MG tablet Take 1 tablet (1 mg total) by mouth every 6 (six) hours as needed for Anxiety.    dicyclomine  (BENTYL) 10 MG capsule TAKE 1 CAPSULE BY MOUTH 3 TIMES A DAY AS NEEDED FOR PAIN    docusate sodium (STOOL SOFTENER ORAL) Take by mouth as needed (Constipation).    enoxaparin (LOVENOX) 40 mg/0.4 mL Syrg Inject 0.4 mLs (40 mg total) into the skin once daily. for 21 days    erythromycin with ethanol (THERAMYCIN) 2 % external solution 1 application every evening. Apply to affected area    losartan (COZAAR) 100 MG tablet once daily.     naproxen sodium (ALEVE ORAL) Take by mouth as needed (Abdominal pain).    ondansetron (ZOFRAN) 8 MG tablet Take 1 tablet (8 mg total) by mouth every 8 (eight) hours as needed for Nausea.    oxyCODONE (ROXICODONE) 15 MG Tab Take 1 tablet (15 mg total) by mouth every 4 (four) hours as needed for Pain.    senna (SENOKOT) 8.6 mg tablet Take 1 tablet by mouth once daily.    sulfamethoxazole-trimethoprim 400-80mg (BACTRIM,SEPTRA) 400-80 mg per tablet Take 1 tablet by mouth once daily.    traMADol (ULTRAM) 50 mg tablet Take 1 tablet (50 mg total) by mouth every 6 (six) hours as needed for Pain.    traMADol (ULTRAM) 50 mg tablet Take 1 tablet (50 mg total) by mouth every 6 (six) hours as needed.    zolpidem (AMBIEN) 10 mg Tab every evening.      Family History     Problem Relation (Age of Onset)    Cataracts Father, Mother    Heart attack Father (64)    Hypertension Father        Tobacco Use    Smoking status: Never Smoker    Smokeless tobacco: Never Used   Substance and Sexual Activity    Alcohol use: No     Frequency: Never    Drug use: No    Sexual activity: Not Currently     Partners: Male     Review of Systems   Unable to perform ROS: Mental status change     Objective:     Vital Signs (Most Recent):  Temp: 97.8 °F (36.6 °C) (02/19/20 1300)  Pulse: (!) 167 (02/19/20 1300)  Resp: (!) 24 (02/19/20 1300)  BP: 131/67 (02/19/20 1300)  SpO2: (!) 64 % (02/19/20 1300) Vital Signs (24h Range):  Temp:  [97.7 °F (36.5 °C)-98 °F (36.7 °C)] 97.8 °F (36.6 °C)  Pulse:  [] 167  Resp:   [14-42] 24  SpO2:  [58 %-100 %] 64 %  BP: ()/() 131/67     Weight: 54.4 kg (120 lb)  Body mass index is 20.6 kg/m².    Physical Exam   Constitutional: She appears well-developed. No distress.   HENT:   Head: Normocephalic and atraumatic.   Cardiovascular:   Sinus tachycardia   Pulmonary/Chest: Effort normal. She has rales.   Abdominal: Soft.   Diminished bowel sounds   Musculoskeletal: She exhibits no edema.   Neurological:   Lethargic    Skin: Skin is warm. Capillary refill takes less than 2 seconds. She is not diaphoretic.   Psychiatric:   unable to assess   Nursing note and vitals reviewed.          Significant Labs: All pertinent labs within the past 24 hours have been reviewed.    Significant Imaging: I have reviewed all pertinent imaging results/findings within the past 24 hours.  I have reviewed and interpreted all pertinent imaging results/findings within the past 24 hours.    Assessment/Plan:     * Acute respiratory failure with hypoxia  2/2 pulmonary embolism  Patient's wishes are to proceed with comfort measures and not to treat respiratory failure or embolism  Code status changed to DNR and transitioned to full comfort measures  Currently at goal with PRN morphine and ativan IV  Too unstable to transfer to inpatient hospice at this time and home hospice is inappropriate based on extensive care patient requires to achieve full comfort  Family at bedside. Emotional support provided  Bereavement tray. Palliative care on board        Acute pulmonary embolism  Comfort measures      S/P right colectomy  Comfort measures      S/P JENA (total abdominal hysterectomy)  Comfort measures      S/P JENA-BSO/Right colectomy/para-caval node resection Feb 2020  Comfort measures      Endometrial cancer  Comfort measures        VTE Risk Mitigation (From admission, onward)         Ordered     IP VTE HIGH RISK PATIENT  Once      02/19/20 1300                   Marley Nath MD  Department of Hospital Medicine    [facility]

## 2020-02-19 NOTE — ED NOTES
"Pt very anxious in bed stating, "I want to get out of here, take this off of me." Pt not tolerating Bipap well. MD at bedside and removed BiPap. Daughter at bedside comforting pt.   "

## 2020-02-19 NOTE — HPI
84 year old female with uterine cancer s/p JENA-BSO/right colectomy/para-caval node resection earlier this month who presented with the emergency department with shortness of breath and generalized weakness. While in the ED, patient was able to provide history and was stable from respiratory standpoint. While in the ED, patient experienced acute worsening tachypnea, anxiety and hypoxia. Was placed on non rebreather and CTA of chest obtained. This was positive for pulmonary embolism. Patient then requested non rebreather to be removed. Was placed on BiPAP as oxygen saturation continued to drop to low 60s. Patient also requested this be removed. ED staff, patient and daughter discussed goals of care. Expressed that comfort is main goal and want no heroic measures be taken. Also expressed DNR as code status. Palliative care consulted and transitioned to full comfort measures. Patient too unstable to transfer to inpatient hospice from ED or to proceed with home hospice. Admitted for comfort measures.

## 2020-02-19 NOTE — ED NOTES
"Pt removing nonrebreather, stating "I dont want this" MD at bedside. And Resp at bedside for Bipap  "

## 2020-02-19 NOTE — ED NOTES
Pt desat to 85-88% RA. Pt placed on 2L NC with no improvement. Pt O2 increased to 4L NC with sats 88-90%. Dr. Dias to bedside. Pt very anxious and placed on NRB. Resp to bedside. Pt tachy,tachypneic and auditory rales.

## 2020-02-20 LAB
BACTERIA #/AREA URNS HPF: NORMAL /HPF
BILIRUB UR QL STRIP: NEGATIVE
CLARITY UR: CLEAR
COLOR UR: YELLOW
GLUCOSE UR QL STRIP: NEGATIVE
HGB UR QL STRIP: NEGATIVE
HYALINE CASTS #/AREA URNS LPF: NORMAL /LPF
KETONES UR QL STRIP: ABNORMAL
LEUKOCYTE ESTERASE UR QL STRIP: NEGATIVE
MICROSCOPIC COMMENT: NORMAL
NITRITE UR QL STRIP: NEGATIVE
PH UR STRIP: 7 [PH] (ref 5–8)
PROT UR QL STRIP: ABNORMAL
RBC #/AREA URNS HPF: 0 /HPF (ref 0–4)
SP GR UR STRIP: 1.02 (ref 1–1.03)
SQUAMOUS #/AREA URNS HPF: NORMAL /HPF
URN SPEC COLLECT METH UR: ABNORMAL
UROBILINOGEN UR STRIP-ACNC: NEGATIVE EU/DL
WBC #/AREA URNS HPF: 1 /HPF (ref 0–5)

## 2020-02-20 PROCEDURE — 11000001 HC ACUTE MED/SURG PRIVATE ROOM

## 2020-02-20 PROCEDURE — 99232 SBSQ HOSP IP/OBS MODERATE 35: CPT | Mod: ,,, | Performed by: NURSE PRACTITIONER

## 2020-02-20 PROCEDURE — 63600175 PHARM REV CODE 636 W HCPCS: Performed by: HOSPITALIST

## 2020-02-20 PROCEDURE — 63600175 PHARM REV CODE 636 W HCPCS: Performed by: INTERNAL MEDICINE

## 2020-02-20 PROCEDURE — 63600175 PHARM REV CODE 636 W HCPCS: Performed by: NURSE PRACTITIONER

## 2020-02-20 PROCEDURE — 63600175 PHARM REV CODE 636 W HCPCS: Performed by: EMERGENCY MEDICINE

## 2020-02-20 PROCEDURE — 99232 PR SUBSEQUENT HOSPITAL CARE,LEVL II: ICD-10-PCS | Mod: ,,, | Performed by: NURSE PRACTITIONER

## 2020-02-20 RX ORDER — LORAZEPAM 2 MG/ML
2 INJECTION INTRAMUSCULAR
Status: DISCONTINUED | OUTPATIENT
Start: 2020-02-20 | End: 2020-02-21 | Stop reason: HOSPADM

## 2020-02-20 RX ORDER — OXYCODONE AND ACETAMINOPHEN 10; 325 MG/1; MG/1
1 TABLET ORAL EVERY 4 HOURS PRN
Status: DISCONTINUED | OUTPATIENT
Start: 2020-02-20 | End: 2020-02-20

## 2020-02-20 RX ORDER — MORPHINE SULFATE 10 MG/ML
3 INJECTION INTRAMUSCULAR; INTRAVENOUS; SUBCUTANEOUS
Status: DISCONTINUED | OUTPATIENT
Start: 2020-02-20 | End: 2020-02-21 | Stop reason: HOSPADM

## 2020-02-20 RX ORDER — LORAZEPAM 2 MG/ML
1 INJECTION INTRAMUSCULAR EVERY 10 MIN PRN
Status: DISCONTINUED | OUTPATIENT
Start: 2020-02-20 | End: 2020-02-20

## 2020-02-20 RX ORDER — MORPHINE SULFATE 10 MG/ML
3 INJECTION INTRAMUSCULAR; INTRAVENOUS; SUBCUTANEOUS ONCE
Status: COMPLETED | OUTPATIENT
Start: 2020-02-20 | End: 2020-02-20

## 2020-02-20 RX ORDER — ONDANSETRON 2 MG/ML
4 INJECTION INTRAMUSCULAR; INTRAVENOUS EVERY 6 HOURS PRN
Status: DISCONTINUED | OUTPATIENT
Start: 2020-02-20 | End: 2020-02-21 | Stop reason: HOSPADM

## 2020-02-20 RX ORDER — LORAZEPAM 2 MG/ML
2 INJECTION INTRAMUSCULAR
Status: DISCONTINUED | OUTPATIENT
Start: 2020-02-20 | End: 2020-02-20

## 2020-02-20 RX ADMIN — MORPHINE SULFATE 3 MG: 10 INJECTION INTRAVENOUS at 04:02

## 2020-02-20 RX ADMIN — LORAZEPAM 1 MG: 2 INJECTION INTRAMUSCULAR; INTRAVENOUS at 09:02

## 2020-02-20 RX ADMIN — MORPHINE SULFATE 3 MG: 10 INJECTION INTRAVENOUS at 01:02

## 2020-02-20 RX ADMIN — LORAZEPAM 2 MG: 2 INJECTION INTRAMUSCULAR; INTRAVENOUS at 06:02

## 2020-02-20 RX ADMIN — LORAZEPAM 2 MG: 2 INJECTION INTRAMUSCULAR; INTRAVENOUS at 01:02

## 2020-02-20 RX ADMIN — MORPHINE SULFATE 3 MG: 10 INJECTION INTRAVENOUS at 11:02

## 2020-02-20 RX ADMIN — MORPHINE SULFATE 3 MG: 10 INJECTION INTRAVENOUS at 08:02

## 2020-02-20 RX ADMIN — ONDANSETRON HYDROCHLORIDE 4 MG: 2 SOLUTION INTRAMUSCULAR; INTRAVENOUS at 04:02

## 2020-02-20 RX ADMIN — PROMETHAZINE HYDROCHLORIDE 12.5 MG: 25 INJECTION INTRAMUSCULAR; INTRAVENOUS at 07:02

## 2020-02-20 RX ADMIN — MORPHINE SULFATE 3 MG: 10 INJECTION INTRAVENOUS at 07:02

## 2020-02-20 NOTE — PROGRESS NOTES
"Went by to check on patient and family. Daughter at bedside. Patient appears comfortable and was able to wake up and talk with me. She is alert to self and place but confused about the events of the day. She denies SOB or pain at present. Her sats were still 64% at last check but patient still refused to put on oxygen and told me oxygen makes her feel like she is smothering. I let her know we can use BNC and not a mask. She asked me what happened and what the next step is. I asked if I could talk openly to her and she was agreeable. I let her know the events of the day and that we were concernd she was going to die earlier this am based on her presentation. She states" I wish I would have." We talked about her thoughts and wishes and she agreed no treatment just to be comfortable. She asked for water and then fell back to sleep. Another daughter came in and wanted update. Addressed all questions and concerns. I will follow up in am    Patient has completed all paperwork to have her body donated to [facility] at time of death. Number isn blue folder.           >50 %of 25 mins spent in face to face encounter, review of documentation, symptom management, coordination of care   "

## 2020-02-20 NOTE — ASSESSMENT & PLAN NOTE
2/2 pulmonary embolism  Patient's wishes are to proceed with comfort measures and not to treat respiratory failure or embolism  Code status changed to DNR and transitioned to full comfort measures  Currently at goal with PRN morphine and ativan IV. Will add oral narcotics for longer effect  Patient now alert and aware. Still preferring hospice. Home hospice will not help as she has no one to stay with her 24-7 not to mention her pain is quite difficult to control. Inpatient hospice would be of her benefit. Will discuss with palliative care  Family at bedside. Emotional support provided  Bereavement tray. Palliative care on board

## 2020-02-20 NOTE — PLAN OF CARE
Problem: Fall Injury Risk  Goal: Absence of Fall and Fall-Related Injury  Outcome: Ongoing, Progressing     Problem: Coping Ineffective  Goal: Effective Coping  Outcome: Ongoing, Progressing     Problem: Adult Inpatient Plan of Care  Goal: Plan of Care Review  Outcome: Ongoing, Progressing  Goal: Patient-Specific Goal (Individualization)  Outcome: Ongoing, Progressing  Goal: Absence of Hospital-Acquired Illness or Injury  Outcome: Ongoing, Progressing  Goal: Optimal Comfort and Wellbeing  Outcome: Ongoing, Progressing  Goal: Readiness for Transition of Care  Outcome: Ongoing, Progressing  Goal: Rounds/Family Conference  Outcome: Ongoing, Progressing

## 2020-02-20 NOTE — PROGRESS NOTES
F/U with patient his am. Daughter present. Patient is alert and oriented to self but drowsy. She was able to eat a small amount this am and agreed to oxygen [facility] but has since taken it off. She appears very restless and bedside nurse here to give Ativan and pain medication. Patient states she cannot take Percocet because it upsets her stomach and she does not want Morphine right now. She c/o pain in upper thigh but states this is nothing new and intermittent and it will go away.   Daughter states patient was restless throughtout night and had to be medicated frequently. We did discussed possible options now that patient is more stable. Patient states please tell me I am not going to live a long time. I let her know I could not tell her that she may develop another clot and or more which could potentially hasten her death. We discussed home hospice but that she is unable to go home without a 24 hours c/g.Patient states she doesn't want this. Daughter states this is not an option because patient lives alone and in a small one bedroom and nowhere for us to stay. Moving [place] with one of her children is an option per daughter but patient refuses to do this to. We discussed inpatient hospice for symptom management is the best option at present due to uncontrolled symptoms but this is a short term option and patient may need another plan. We discussed in order to get patient's symptoms under control she most likely will be sedated the entire time. Patient states she is fine with this but doesn't want to wake up and not know where she is because this frightens her. We talked about her fears and her daughter assured her one of them would be at bedside. Patient c/o still feeling anxious. I will increase dosage of Ativan.   Gave both options for inpatient hospice and daughter feels [facility] is too far from them and prefer [facility].  She would like to talk with her siblings and discuss to make sure they all agree. Updated   [place] and Jolie STONE  I will follow up later after family discuss option        -tentative plan is inpatient hospice possibly tomorrow  -Increased Ativan as restlessness seems to be the most distressing to patient   -palliative will continue to follow      >50 % 25 mins spent in face to face encounter, review of documentation, symptom management, coordination of care

## 2020-02-20 NOTE — PROGRESS NOTES
[facility] Hospital Medicine  Progress Note    Patient Name: Salome Moore  MRN: [id number]  Patient Class: IP- Inpatient   Admission Date: 2/19/2020  Length of Stay: 1 days  Attending Physician: Marley Linder MD  Primary Care Provider: Ike Almeida MD        Subjective:     Principal Problem:Acute respiratory failure with hypoxia        HPI:  84 year old female with uterine cancer s/p JENA-BSO/right colectomy/para-caval node resection earlier this month who presented with the emergency department with shortness of breath and generalized weakness. While in the ED, patient was able to provide history and was stable from respiratory standpoint. While in the ED, patient experienced acute worsening tachypnea, anxiety and hypoxia. Was placed on non rebreather and CTA of chest obtained. This was positive for pulmonary embolism. Patient then requested non rebreather to be removed. Was placed on BiPAP as oxygen saturation continued to drop to low 60s. Patient also requested this be removed. ED staff, patient and daughter discussed goals of care. Expressed that comfort is main goal and want no heroic measures be taken. Also expressed DNR as code status. Palliative care consulted and transitioned to full comfort measures. Patient too unstable to transfer to inpatient hospice from ED or to proceed with home hospice. Admitted for comfort measures.     Overview/Hospital Course:  No notes on file    Interval History: awake, alert and aware. Complaining of abdominal pain, right leg pain and shortness of breath despite morphine about 30 mins prior.     Review of Systems   Respiratory: Positive for shortness of breath.    Cardiovascular: Negative.    Gastrointestinal: Positive for abdominal pain.   Musculoskeletal:        Leg pain     Objective:     Vital Signs (Most Recent):  Temp: 98.2 °F (36.8 °C) (02/20/20 0808)  Pulse: (!) 123 (02/20/20 0808)  Resp: 18 (02/20/20 0808)  BP: 136/84 (02/20/20  0808)  SpO2: 96 % (02/20/20 0808) Vital Signs (24h Range):  Temp:  [97.8 °F (36.6 °C)-98.9 °F (37.2 °C)] 98.2 °F (36.8 °C)  Pulse:  [121-167] 123  Resp:  [18-24] 18  SpO2:  [61 %-96 %] 96 %  BP: ()/(50-84) 136/84     Weight: 53.9 kg (118 lb 11.8 oz)  Body mass index is 20.38 kg/m².  No intake or output data in the 24 hours ending 02/20/20 1048   Physical Exam   Constitutional: She is oriented to person, place, and time. She appears well-developed. She appears distressed.   Cardiovascular:   Sinus tachycardia   Pulmonary/Chest:   Using accessory muscles while speaking   Abdominal: Soft. There is tenderness.   Neurological: She is alert and oriented to person, place, and time.   Skin: She is not diaphoretic.   Nursing note and vitals reviewed.      Significant Labs: All pertinent labs within the past 24 hours have been reviewed.    Significant Imaging: I have reviewed all pertinent imaging results/findings within the past 24 hours.  I have reviewed and interpreted all pertinent imaging results/findings within the past 24 hours.      Assessment/Plan:      * Acute respiratory failure with hypoxia  2/2 pulmonary embolism  Patient's wishes are to proceed with comfort measures and not to treat respiratory failure or embolism  Code status changed to DNR and transitioned to full comfort measures  Currently at goal with PRN morphine and ativan IV. Will add oral narcotics for longer effect  Patient now alert and aware. Still preferring hospice. Home hospice will not help as she has no one to stay with her 24-7 not to mention her pain is quite difficult to control. Inpatient hospice would be of her benefit. Will discuss with palliative care  Family at bedside. Emotional support provided  Bereavement tray. Palliative care on board        Acute pulmonary embolism  Comfort measures      S/P right colectomy  Comfort measures      S/P JENA (total abdominal hysterectomy)  Comfort measures      S/P JENA-BSO/Right  colectomy/para-caval node resection Feb 2020  Comfort measures      Endometrial cancer  Comfort measures        VTE Risk Mitigation (From admission, onward)         Ordered     IP VTE HIGH RISK PATIENT  Once      02/19/20 1300                      Marley Nath MD  Department of Hospital Medicine   [facility]

## 2020-02-20 NOTE — PLAN OF CARE
[facility]     Department of Hospital Medicine     [address] [place] [address]     [phone] after hours  [phone] fax                                   HOSPICE  ORDERS     Patient Name: Salome Moore  YOB: [birth year]    Admit to Hospice:Inpatient Service     Diagnoses:  Active Hospital Problems    Diagnosis  POA    *Acute respiratory failure with hypoxia [J96.01]  Yes    Acute pulmonary embolism [I26.99]  Yes     Priority: 2     S/P right colectomy [Z90.49]  Not Applicable    S/P JENA (total abdominal hysterectomy) [Z90.710]  Yes    S/P JENA-BSO/Right colectomy/para-caval node resection Feb 2020 [Z90.710, Z90.722, Z90.79]  Not Applicable    Endometrial cancer [C54.1]  Yes      Resolved Hospital Problems   No resolved problems to display.       Hospice Qualifying Diagnoses: metastatic cancer, cancer related pain, post op related pain, acute respiratory failure       Patient has a life expectancy < 6 months due to these conditions.    Vital Signs: Routine per Hospice Protocol.    Allergies:  Review of patient's allergies indicates:   Allergen Reactions    Pcn [penicillins] Other (See Comments)     Patient can't remember the reaction. Reaction occurred over 60 years       Diet: regular diet     Activities: activity as tolerated    Nursing: Per Hospice Routine    Future Orders:  Hospice Medical Director may dictate new orders for comfortable care measures & sign death certificate.    Medications:         Comfort Care Medications Only        Electronically signed  _________________________________  Marley Nath MD  02/20/2020

## 2020-02-20 NOTE — NURSING
Patient complained of pain an nausea/ pain meds every 4 hours prn/  notified pain med not holding patient / new orders for Morphine 3mg every 3 hours PRN (pain).

## 2020-02-20 NOTE — SUBJECTIVE & OBJECTIVE
Interval History: awake, alert and aware. Complaining of abdominal pain, right leg pain and shortness of breath despite morphine about 30 mins prior.     Review of Systems   Respiratory: Positive for shortness of breath.    Cardiovascular: Negative.    Gastrointestinal: Positive for abdominal pain.   Musculoskeletal:        Leg pain     Objective:     Vital Signs (Most Recent):  Temp: 98.2 °F (36.8 °C) (02/20/20 0808)  Pulse: (!) 123 (02/20/20 0808)  Resp: 18 (02/20/20 0808)  BP: 136/84 (02/20/20 0808)  SpO2: 96 % (02/20/20 0808) Vital Signs (24h Range):  Temp:  [97.8 °F (36.6 °C)-98.9 °F (37.2 °C)] 98.2 °F (36.8 °C)  Pulse:  [121-167] 123  Resp:  [18-24] 18  SpO2:  [61 %-96 %] 96 %  BP: ()/(50-84) 136/84     Weight: 53.9 kg (118 lb 11.8 oz)  Body mass index is 20.38 kg/m².  No intake or output data in the 24 hours ending 02/20/20 1048   Physical Exam   Constitutional: She is oriented to person, place, and time. She appears well-developed. She appears distressed.   Cardiovascular:   Sinus tachycardia   Pulmonary/Chest:   Using accessory muscles while speaking   Abdominal: Soft. There is tenderness.   Neurological: She is alert and oriented to person, place, and time.   Skin: She is not diaphoretic.   Nursing note and vitals reviewed.      Significant Labs: All pertinent labs within the past 24 hours have been reviewed.    Significant Imaging: I have reviewed all pertinent imaging results/findings within the past 24 hours.  I have reviewed and interpreted all pertinent imaging results/findings within the past 24 hours.

## 2020-02-21 VITALS
OXYGEN SATURATION: 94 % | SYSTOLIC BLOOD PRESSURE: 159 MMHG | BODY MASS INDEX: 20.27 KG/M2 | RESPIRATION RATE: 17 BRPM | TEMPERATURE: 98 F | HEIGHT: 64 IN | DIASTOLIC BLOOD PRESSURE: 75 MMHG | HEART RATE: 117 BPM | WEIGHT: 118.75 LBS

## 2020-02-21 PROCEDURE — 63600175 PHARM REV CODE 636 W HCPCS: Performed by: HOSPITALIST

## 2020-02-21 PROCEDURE — 99232 PR SUBSEQUENT HOSPITAL CARE,LEVL II: ICD-10-PCS | Mod: ,,, | Performed by: NURSE PRACTITIONER

## 2020-02-21 PROCEDURE — 63600175 PHARM REV CODE 636 W HCPCS: Performed by: NURSE PRACTITIONER

## 2020-02-21 PROCEDURE — 99232 SBSQ HOSP IP/OBS MODERATE 35: CPT | Mod: ,,, | Performed by: NURSE PRACTITIONER

## 2020-02-21 PROCEDURE — 25000003 PHARM REV CODE 250: Performed by: INTERNAL MEDICINE

## 2020-02-21 RX ORDER — FENTANYL 25 UG/1
1 PATCH TRANSDERMAL
Status: DISCONTINUED | OUTPATIENT
Start: 2020-02-21 | End: 2020-02-21 | Stop reason: HOSPADM

## 2020-02-21 RX ADMIN — MORPHINE SULFATE 3 MG: 10 INJECTION INTRAVENOUS at 05:02

## 2020-02-21 RX ADMIN — FENTANYL 1 PATCH: 25 PATCH, EXTENDED RELEASE TRANSDERMAL at 10:02

## 2020-02-21 RX ADMIN — LORAZEPAM 2 MG: 2 INJECTION INTRAMUSCULAR; INTRAVENOUS at 12:02

## 2020-02-21 RX ADMIN — MORPHINE SULFATE 3 MG: 10 INJECTION INTRAVENOUS at 09:02

## 2020-02-21 RX ADMIN — LORAZEPAM 2 MG: 2 INJECTION INTRAMUSCULAR; INTRAVENOUS at 09:02

## 2020-02-21 RX ADMIN — MORPHINE SULFATE 3 MG: 10 INJECTION INTRAVENOUS at 12:02

## 2020-02-21 NOTE — PLAN OF CARE
FABIANA Cat Palliative care nurse spoke with family. [facility] representative to speak with pt prior to transporting to [facility] pt Hospice. [facility].     02/21/20 1021   Post-Acute Status   Post-Acute Authorization Home Health/Hospice   Post-Acute Placement Status Set-up Complete   Home Health/Hospice Status Set-up Complete   Patient choice form signed by patient/caregiver List with quality metrics by geographic area provided   Discharge Delays (!) Patient Transportation  (awaiting time to schedule transport)   Discharge Plan   Discharge Plan A Inpatient Hospice   Discharge Plan B Inpatient Hospice   .Jolie Aguirre RN, BSN, STN CCM  2/21/2020

## 2020-02-21 NOTE — NURSING
PIV removed with tip intact, 4x4 applied, pt tolerated well, [facility] here to  patient via stretcher. Paperwork hand to EMT transporters. MARY

## 2020-02-21 NOTE — PLAN OF CARE
FABIANA Cat palliative care spoke to family and pt,decision to go with [facility] when bed available. Most likely tomorrow.     02/20/20 1154   Discharge Assessment   Assessment Type Discharge Planning Assessment   Assessment information obtained from? Medical Record   Communicated expected length of stay with patient/caregiver no   Prior to hospitilization cognitive status: Alert/Oriented   Prior to hospitalization functional status: Needs Assistance   Current cognitive status: Alert/Oriented   Current Functional Status: Needs Assistance   Able to Return to Prior Arrangements no   Is patient able to care for self after discharge? No   Patient's perception of discharge disposition admitted as an inpatient   Readmission Within the Last 30 Days no previous admission in last 30 days   Patient currently being followed by outpatient case management? Unable to determine (comments)   Patient currently receives any other outside agency services? No   Equipment Currently Used at Home none   Do you have any problems affording any of your prescribed medications? No   Is the patient taking medications as prescribed? yes   Does the patient have transportation home? Yes   Does the patient receive services at the [facility]? No   Discharge Plan A Inpatient Hospice   Discharge Plan B Inpatient Hospice   Patient/Family in Agreement with Plan yes   Does the patient have transportation to healthcare appointments? Yes

## 2020-02-21 NOTE — PLAN OF CARE
Problem: Fall Injury Risk  Goal: Absence of Fall and Fall-Related Injury  Outcome: Ongoing, Progressing  Intervention: Promote Injury-Free Environment  Flowsheets (Taken 2/21/2020 1156)  Safety Promotion/Fall Prevention: bed alarm set; commode/urinal/bedpan at bedside; Fall Risk reviewed with patient/family; Fall Risk signage in place; room near unit station; side rails raised x 3; instructed to call staff for mobility  Environmental Safety Modification: assistive device/personal items within reach; clutter free environment maintained; lighting adjusted; room near unit station; room organization consistent     Patient remained free from falls and injury during shift, medication administered per MD orders, pt is able to make needs known, daughter at bedside, safety maintained  bed alarm set, bed in low lock position with call bell in reach, will continue to monitor. Pt is being transferred to inpatient hospice.

## 2020-02-21 NOTE — PLAN OF CARE
Patient remained free from injury throughout shift.  Analgesic on plan of care.  Patient medicated twice throughout shift for discomfort.  Patient medicated once for anxiety.  Bed alarm set for patient's safety.  Call light within reach.        Problem: Fall Injury Risk  Goal: Absence of Fall and Fall-Related Injury  Outcome: Ongoing, Progressing     Problem: Coping Ineffective  Goal: Effective Coping  Outcome: Ongoing, Progressing     Problem: Infection  Goal: Infection Symptom Resolution  Outcome: Ongoing, Progressing     Problem: Adult Inpatient Plan of Care  Goal: Plan of Care Review  Outcome: Ongoing, Progressing  Flowsheets (Taken 2/21/2020 0506)  Plan of Care Reviewed With: patient  Goal: Patient-Specific Goal (Individualization)  Outcome: Ongoing, Progressing  Goal: Absence of Hospital-Acquired Illness or Injury  Outcome: Ongoing, Progressing  Goal: Optimal Comfort and Wellbeing  Outcome: Ongoing, Progressing  Goal: Readiness for Transition of Care  Outcome: Ongoing, Progressing  Goal: Rounds/Family Conference  Outcome: Ongoing, Progressing

## 2020-02-21 NOTE — HOSPITAL COURSE
Ms Moore presented with acute respiratory failure and abdominal pain. Patient recently underwent extensive abdominal, debulking surgery for metastatic endometrial cancer. Workup significant for pulmonary embolism. Patient experienced acute worsening of respiratory status with sats dropping as low as 60%. Non rebreather and BiPAP attempted but patient preferred to have no interventions and to proceed with full comfort measures. Code status changed to DNR and transitioned to comfort. Patient was too unstable to transfer to inpatient hospice at the time therefore admitted to hospital medicine. Family present and aware of current management. Very supportive as well. Palliative care consulted. Patient actually woke up later that day and able to eat a meal but pain and SOB were still an issue. Patient was still clear about comfort measures. Medicines adjusted and discussed home hospice vs inpatient hospice. Home hospice was not preferred given intensity of her treatment and no 24 hour support available. Inpatient hospice preferred but facility had no female bed available on 2/20. Bed available on 2/21. Discharged to inpatient [facility].

## 2020-02-21 NOTE — PLAN OF CARE
02/21/20 1151   Final Note   Assessment Type Final Discharge Note   Anticipated Discharge Disposition Dismissed Te   What phone number can be called within the next 1-3 days to see how you are doing after discharge?   ([phone])   Discharge plans and expectations educations in teach back method with documentation complete? Yes   Right Care Referral Info   Referral Type HOSPICE   Facility Name [facility]   Post-Acute Status   Post-Acute Authorization Home Health/Hospice   Post-Acute Placement Status Set-up Complete   Home Health/Hospice Status Set-up Complete   Patient choice form signed by patient/caregiver List with quality metrics by geographic area provided   Discharge Delays None known at this time   [facility].Jolie Aguirre, RN, BSN, STN [facility]  2/21/2020

## 2020-02-21 NOTE — PROGRESS NOTES
ADT 30 order placed for Stretcher Transportation.  Requested  time:12:30PM ETA with stretcher and oxygen  If transportation does not arrive at ETA time nurse will be instructed to follow protocol for transportation below:   How can I get in touch directly with dispatch, if needed?                 · Non-emergent (stretcher): [phone]    · Emergent dispatch: [phone]    ++NURSING:  If Stretcher does not arrive at requested time please call the above Non Emergent Dispatcher.  If issue not resolved please escalate to your charge nurse for further instructions.  .Jolie Aguirre, RN, BSN, STN [facility]  2/21/2020

## 2020-02-21 NOTE — DISCHARGE SUMMARY
[facility] Hospital Medicine  Discharge Summary      Patient Name: Salome Moore  MRN: [id number]  Admission Date: 2/19/2020  Hospital Length of Stay: 2 days  Discharge Date and Time:  02/21/2020 10:57 AM  Attending Physician: Marley Linder MD   Discharging Provider: Marley Nath MD  Primary Care Provider: Ike Almeida MD      HPI:   84 year old female with uterine cancer s/p JENA-BSO/right colectomy/para-caval node resection earlier this month who presented with the emergency department with shortness of breath and generalized weakness. While in the ED, patient was able to provide history and was stable from respiratory standpoint. While in the ED, patient experienced acute worsening tachypnea, anxiety and hypoxia. Was placed on non rebreather and CTA of chest obtained. This was positive for pulmonary embolism. Patient then requested non rebreather to be removed. Was placed on BiPAP as oxygen saturation continued to drop to low 60s. Patient also requested this be removed. ED staff, patient and daughter discussed goals of care. Expressed that comfort is main goal and want no heroic measures be taken. Also expressed DNR as code status. Palliative care consulted and transitioned to full comfort measures. Patient too unstable to transfer to inpatient hospice from ED or to proceed with home hospice. Admitted for comfort measures.     * No surgery found *      Hospital Course:   Ms Moore presented with acute respiratory failure and abdominal pain. Patient recently underwent extensive abdominal, debulking surgery for metastatic endometrial cancer. Workup significant for pulmonary embolism. Patient experienced acute worsening of respiratory status with sats dropping as low as 60%. Non rebreather and BiPAP attempted but patient preferred to have no interventions and to proceed with full comfort measures. Code status changed to DNR and transitioned to comfort. Patient was too unstable to  transfer to inpatient hospice at the time therefore admitted to hospital medicine. Family present and aware of current management. Very supportive as well. Palliative care consulted. Patient actually woke up later that day and able to eat a meal but pain and SOB were still an issue. Patient was still clear about comfort measures. Medicines adjusted and discussed home hospice vs inpatient hospice. Home hospice was not preferred given intensity of her treatment and no 24 hour support available. Inpatient hospice preferred but facility had no female bed available on 2/20. Bed available on 2/21. Discharged to inpatient [facility].      Consults:   Consults (From admission, onward)        Status Ordering Provider     Inpatient consult to Palliative Care  Once     Provider:  (Not yet assigned)    Completed MARCE DAMON     Inpatient consult to Spiritual Care  Once     Provider:  (Not yet assigned)    Completed MARCE DAMON        Final Active Diagnoses:    Diagnosis Date Noted POA    PRINCIPAL PROBLEM:  Acute respiratory failure with hypoxia [J96.01] 02/19/2020 Yes    Acute pulmonary embolism [I26.99] 02/19/2020 Yes    Palliative care encounter [Z51.5]  Not Applicable    S/P right colectomy [Z90.49] 02/16/2020 Not Applicable    S/P JENA (total abdominal hysterectomy) [Z90.710] 02/10/2020 Yes    S/P JENA-BSO/Right colectomy/para-caval node resection Feb 2020 [Z90.710, Z90.722, Z90.79] 02/10/2020 Not Applicable    Endometrial cancer [C54.1] 01/10/2020 Yes      Problems Resolved During this Admission:       Discharged Condition: stable    Disposition: Hospice/Medical Facility    Medications:  Comfort measures only    Indwelling Lines/Drains at time of discharge:   Lines/Drains/Airways     None                 Time spent on the discharge of patient: > 35 minutes  Patient was seen and examined on the date of discharge and determined to be suitable for discharge.         Marley Nath MD  Department of  Hospital Medicine [facility]

## 2020-02-21 NOTE — PROGRESS NOTES
Patient is very restless and has thrown all covers off and gown thrown off. She is talking but very confused. Daughter at bedside and states she has been cursing and restless most of night per her sister. Patient is grimacing and c/o pain and rubbing her right leg. Her abdomen is soft, s/p surgery and steri strips at surgical site. Patient recently had Ativan 2mg IV. Asked nurse to give Morphine. I will talk to Dr Nath about starting a fentanyl patch to avoid peaks and valleys in pain and dyspnea relief. Daughter had several questions and concerns and addressed these to her satisfaction. [facility] nurse on her way to admit patient. Patient will be transferred to inpatient hospice today.        >50 % 25 mins spent in face to face encounter, review of documentation, symptom management, coordination of care

## 2020-02-21 NOTE — PROGRESS NOTES
TN provided med surg nurse/Crystel with call report information, [phone].Jolie Aguirre RN, BSN, [facility]  2/21/2020

## 2020-02-24 NOTE — PHYSICIAN QUERY
PT Name: Salome Moore  MR #: [id number]    Physician Query Form - Pathology Findings Clarification     CDS/: HAM Hernández,RNC-MNN          Contact information:[email or web address]  This form is a permanent document in the medical record.     Query Date: February 24, 2020      By submitting this query, we are merely seeking further clarification of documentation.  Please utilize your independent clinical judgment when addressing the question(s) below.      The medical record contains the following:     Findings Supporting Clinical Information Location in Medical Record   1. UTERUS, CERVIX, BILATERAL FALLOPIAN TUBES AND OVARIES, TOTAL ABDOMINAL  HYSTERECTOMY WITH BILATERAL SALPINGO OOPHORECTOMY:  UTERUS:  - Endometrioid endometrial adenocarcinoma FIGO grade 3 with full thickness myometrial involvement,  extension to right ovary, invasion of cervical stroma, and direct extension into the cecum, see Tumor  Synoptic.  - Size of tumor: At least 10.5 CM.  - Lower uterine segment is involved and demonstrates stromal invasion by endometrial carcinoma.  - Lymphovascular invasion is present.  - Paracervical/parametrial shave margins are positive for tumor, bilateral.  - Pathologic staging: pT4 N1a  CERVIX:  - Endometrial carcinoma invades cervical stroma.  RIGHT OVARY AND FALLOPIAN TUBE:  - Endometrial carcinoma invades and obliterates right ovary.  - Endometrial carcinoma involves serosa of right fallopian tube and replaces fimbriated end.  LEFT OVARY AND FALLOPIAN TUBE:  - Endometrial carcinoma is present in lymphovascular spaces of tubo-ovarian complex.  - Endometrial carcinoma involves serosa of left fallopian tube.  - Benign left ovary without endometrial carcinoma extension.  2. ANTERIOR BLADDER NODULE, EXCISION:  - Endometrial carcinoma is present in fibroadipose tissue/bladder serosal tissue.  3. LYMPH NODES, RIGHT OBTURATOR, EXCISION:  - Two of thirteen lymph nodes POSITIVE for  metastatic carcinoma (2/13).  - Size of largest metastatic tumor deposit: 2.5 CM  - Extranodal extension is present.  4. LYMPH NODES, RIGHT PARACAVAL, EXCISION:  - One of one lymph node POSITIVE for metastatic carcinoma (1/1).  - Size of metastatic tumor deposit: 4.5 CM.  - Extranodal extension is present.  5. CECUM, APPENDIX, AND TERMINAL ILEUM, RIGHT, HEMICOLECTOMY:  - Endometrial carcinoma invades through serosa of cecum, appendix, and terminal ileum and involves cecal  mucosa.  - Margins are negative for carcinoma.  - Three of seven pericolonic lymph nodes POSITIVE for metastatic carcinoma (3/7).                                                                                                     PROCEDURES PERFORMED: Exploratory laparotomy, modified radical abdominal hysterectomy, bilateral salpingo-oophorectomy, right pelvic lymphadenectomy, resection of anterior bladder peritoneum nodule, tumor debulking  Urology- Cystoscopy with right ureteral catheter palcement   Surgical oncology- Retroperitoneal transabdominal lymphadenectomy, side wall reconstruction of the vena cava by primary suture, right hemicolectomy with removal of the terminal ileum with ileocolostomy reanastomosis     FINDINGS:   Enlarged 14 week size uterus with extension of tumor involving the right adnexa and right broad ligament with further extension to the cecum and terminal ileum requiring modified radical hsyterectomy for resection. 2cm right colon mesenteric node. 3cm right obturator pelvic lymph node. 4cm right paracaval node. Normal omentum. 2cm anterior bladder peritoneum nodule. Complete resection with no gross residual disease remaining Pathology report 2/10                                                                                                  Op note 2/10     Please document the clinical significance of the Pathologists findings of   1. UTERUS, CERVIX, BILATERAL FALLOPIAN TUBES AND OVARIES, TOTAL ABDOMINAL  HYSTERECTOMY  WITH BILATERAL SALPINGO OOPHORECTOMY:  UTERUS:  - Endometrioid endometrial adenocarcinoma FIGO grade 3 with full thickness myometrial involvement,  extension to right ovary, invasion of cervical stroma, and direct extension into the cecum, see Tumor  Synoptic.  - Size of tumor: At least 10.5 CM.  - Lower uterine segment is involved and demonstrates stromal invasion by endometrial carcinoma.  - Lymphovascular invasion is present.  - Paracervical/parametrial shave margins are positive for tumor, bilateral.  - Pathologic staging: pT4 N1a  CERVIX:  - Endometrial carcinoma invades cervical stroma.  RIGHT OVARY AND FALLOPIAN TUBE:  - Endometrial carcinoma invades and obliterates right ovary.  - Endometrial carcinoma involves serosa of right fallopian tube and replaces fimbriated end.  LEFT OVARY AND FALLOPIAN TUBE:  - Endometrial carcinoma is present in lymphovascular spaces of tubo-ovarian complex.  - Endometrial carcinoma involves serosa of left fallopian tube.  - Benign left ovary without endometrial carcinoma extension.  2. ANTERIOR BLADDER NODULE, EXCISION:  - Endometrial carcinoma is present in fibroadipose tissue/bladder serosal tissue.  3. LYMPH NODES, RIGHT OBTURATOR, EXCISION:  - Two of thirteen lymph nodes POSITIVE for metastatic carcinoma (2/13).  - Size of largest metastatic tumor deposit: 2.5 CM  - Extranodal extension is present.  4. LYMPH NODES, RIGHT PARACAVAL, EXCISION:  - One of one lymph node POSITIVE for metastatic carcinoma (1/1).  - Size of metastatic tumor deposit: 4.5 CM.  - Extranodal extension is present.  5. CECUM, APPENDIX, AND TERMINAL ILEUM, RIGHT, HEMICOLECTOMY:  - Endometrial carcinoma invades through serosa of cecum, appendix, and terminal ileum and involves cecal  mucosa.  - Margins are negative for carcinoma.  - Three of seven pericolonic lymph nodes POSITIVE for metastatic carcinoma (3/7).    [ X  ] I agree with the Pathology Findings   [   ] I do not agree with the Pathology Findings    [   ] Other/Clarification of Findings:   [   ] Clinically Insignificant   [  ] Clinically Undetermined       Please document in your progress notes daily for the duration of treatment until resolved and include in your discharge summary.

## 2020-03-02 ENCOUNTER — TELEPHONE (OUTPATIENT)
Dept: GYNECOLOGIC ONCOLOGY | Facility: CLINIC | Age: 85
End: 2020-03-02

## 2020-03-02 NOTE — TELEPHONE ENCOUNTER
Called to follow up with family regarding patient. Spoke with Kasia and left voicemail for Dorothy. Expressed my condolences and offered support.

## 2020-05-15 LAB
COMMENT: NORMAL
FINAL PATHOLOGIC DIAGNOSIS: NORMAL
GROSS: NORMAL
SUPPLEMENTAL DIAGNOSIS: NORMAL

## 2021-04-16 ENCOUNTER — PATIENT MESSAGE (OUTPATIENT)
Dept: RESEARCH | Facility: HOSPITAL | Age: 86
End: 2021-04-16

## 2023-01-03 NOTE — ASSESSMENT & PLAN NOTE
- Hg 7.7 this AM  - 1 unit pRBC ordered to be held  - transfuse PRN symptoms   Patient, Chinedu Roque (MRN #[id number]), presented with a recorded BMI of 42.09 kg/m^2 consistent with the definition of morbid obesity (ICD-10 E66.01). The patient's morbid obesity was monitored, evaluated, addressed and/or treated. This addendum to the medical record is made on 01/03/2023.
